# Patient Record
Sex: FEMALE | Race: WHITE | NOT HISPANIC OR LATINO | Employment: OTHER | URBAN - METROPOLITAN AREA
[De-identification: names, ages, dates, MRNs, and addresses within clinical notes are randomized per-mention and may not be internally consistent; named-entity substitution may affect disease eponyms.]

---

## 2017-01-03 ENCOUNTER — TELEPHONE (OUTPATIENT)
Dept: REHABILITATION | Facility: HOSPITAL | Age: 82
End: 2017-01-03

## 2017-01-05 ENCOUNTER — OFFICE VISIT (OUTPATIENT)
Dept: FAMILY MEDICINE | Facility: CLINIC | Age: 82
End: 2017-01-05
Payer: MEDICARE

## 2017-01-05 ENCOUNTER — CLINICAL SUPPORT (OUTPATIENT)
Dept: REHABILITATION | Facility: HOSPITAL | Age: 82
End: 2017-01-05
Attending: NURSE PRACTITIONER
Payer: MEDICARE

## 2017-01-05 VITALS
HEIGHT: 63 IN | SYSTOLIC BLOOD PRESSURE: 110 MMHG | WEIGHT: 142.81 LBS | DIASTOLIC BLOOD PRESSURE: 58 MMHG | RESPIRATION RATE: 16 BRPM | HEART RATE: 60 BPM | BODY MASS INDEX: 25.3 KG/M2

## 2017-01-05 DIAGNOSIS — R07.0 PAIN IN THROAT: Primary | ICD-10-CM

## 2017-01-05 DIAGNOSIS — J38.00 VOCAL CORD PARALYSIS: Primary | ICD-10-CM

## 2017-01-05 DIAGNOSIS — G89.4 CHRONIC PAIN SYNDROME: ICD-10-CM

## 2017-01-05 DIAGNOSIS — M79.605 BILATERAL LEG PAIN: ICD-10-CM

## 2017-01-05 DIAGNOSIS — M79.604 BILATERAL LEG PAIN: ICD-10-CM

## 2017-01-05 DIAGNOSIS — R13.12 OROPHARYNGEAL DYSPHAGIA: ICD-10-CM

## 2017-01-05 DIAGNOSIS — I10 ESSENTIAL HYPERTENSION: ICD-10-CM

## 2017-01-05 DIAGNOSIS — M79.7 FIBROMYALGIA: ICD-10-CM

## 2017-01-05 PROCEDURE — G8997 SWALLOW GOAL STATUS: HCPCS | Mod: CI

## 2017-01-05 PROCEDURE — 92610 EVALUATE SWALLOWING FUNCTION: CPT

## 2017-01-05 PROCEDURE — G8996 SWALLOW CURRENT STATUS: HCPCS | Mod: CJ

## 2017-01-05 PROCEDURE — 99999 PR PBB SHADOW E&M-EST. PATIENT-LVL III: CPT | Mod: PBBFAC,,, | Performed by: FAMILY MEDICINE

## 2017-01-05 PROCEDURE — 99213 OFFICE O/P EST LOW 20 MIN: CPT | Mod: S$PBB,,, | Performed by: FAMILY MEDICINE

## 2017-01-05 PROCEDURE — 99213 OFFICE O/P EST LOW 20 MIN: CPT | Mod: PBBFAC | Performed by: FAMILY MEDICINE

## 2017-01-05 RX ORDER — HYDROCODONE BITARTRATE AND ACETAMINOPHEN 10; 325 MG/1; MG/1
1 TABLET ORAL 4 TIMES DAILY
Qty: 120 TABLET | Refills: 0 | Status: SHIPPED | OUTPATIENT
Start: 2017-02-06 | End: 2017-01-05 | Stop reason: SDUPTHER

## 2017-01-05 RX ORDER — BUMETANIDE 1 MG/1
TABLET ORAL
Qty: 30 TABLET | Refills: 5 | Status: SHIPPED | OUTPATIENT
Start: 2017-01-05 | End: 2018-02-07 | Stop reason: SDUPTHER

## 2017-01-05 RX ORDER — HYDROCODONE BITARTRATE AND ACETAMINOPHEN 10; 325 MG/1; MG/1
1 TABLET ORAL 4 TIMES DAILY
Qty: 120 TABLET | Refills: 0 | Status: SHIPPED | OUTPATIENT
Start: 2017-04-06 | End: 2017-04-03 | Stop reason: SDUPTHER

## 2017-01-05 RX ORDER — HYDROCODONE BITARTRATE AND ACETAMINOPHEN 10; 325 MG/1; MG/1
1 TABLET ORAL 4 TIMES DAILY
Qty: 120 TABLET | Refills: 0 | Status: SHIPPED | OUTPATIENT
Start: 2017-03-05 | End: 2017-02-03 | Stop reason: SDUPTHER

## 2017-01-05 RX ORDER — METOPROLOL TARTRATE 100 MG/1
TABLET ORAL
Qty: 30 TABLET | Refills: 5 | Status: SHIPPED | OUTPATIENT
Start: 2017-04-05 | End: 2017-07-06 | Stop reason: SDUPTHER

## 2017-01-05 RX ORDER — DICLOFENAC SODIUM 10 MG/G
GEL TOPICAL
Qty: 500 G | Refills: 11 | Status: SHIPPED | OUTPATIENT
Start: 2017-01-05 | End: 2017-10-05 | Stop reason: SDUPTHER

## 2017-01-05 RX ORDER — METOPROLOL TARTRATE 100 MG/1
TABLET ORAL
Qty: 30 TABLET | Refills: 5 | Status: SHIPPED | OUTPATIENT
Start: 2017-01-05 | End: 2017-01-05 | Stop reason: SDUPTHER

## 2017-01-05 NOTE — PROGRESS NOTES
Outpatient Speech Pathology Evaluation-Dysphagia      Date: 01/05/17    Start Time:  0910  Stop Time:  0950  Total time: 40 minutes  Charges Billed/# of units:  1X Eval of swallow and oral function  Referring Physician: Alysa Nogueira NP    Primary Diagnosis:  Dysphagia/ Dysphonia  Treatment Diagnosis:  Dysphagia Dysphonia  Certification Period:  01/05/17 to 02/10/17  Past Medical History:  Past Medical History   Diagnosis Date    Arthritis     Bowel obstruction 4/6/214     [facility]     Diverticulitis     Fibromyalgia     Hyperlipidemia     Hypertension     Pinched nerve      x 2    Sciatica      Precautions:  none  Prior Therapy:  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      Medications:   Current Outpatient Prescriptions on File Prior to Visit   Medication Sig Dispense Refill    amitriptyline (ELAVIL) 25 MG tablet Take 1 tablet (25 mg total) by mouth every evening. 30 tablet 5    bumetanide (BUMEX) 1 MG tablet Take for severe swelling of her legs 30 tablet 5    cloNIDine (CATAPRES) 0.1 MG tablet Take 0.5 tablets (0.05 mg total) by mouth 2 (two) times daily. 60 tablet 5    cyanocobalamin (VITAMIN B-12) 250 MCG tablet Take 250 mcg by mouth once daily.      diclofenac sodium (VOLTAREN) 1 % Gel APPLY TOPICALLY 4 TIMES A DAY AS NEEDED 500 g 11    ergocalciferol (VITAMIN D2) 50,000 unit Cap Take 1 capsule (50,000 Units total) by mouth every 7 days. 24 capsule 0    gabapentin (NEURONTIN) 300 MG capsule TAKE 2 CAPSULE BY MOUTH BREAKFAST AND LUNCH AND 3 AT BEDTIME 210 capsule 5    [START ON 3/5/2017] hydrocodone-acetaminophen 10-325mg (NORCO)  mg Tab Take 1 tablet by mouth 4 (four) times daily. 120 tablet 0    [START ON 4/6/2017] hydrocodone-acetaminophen 10-325mg (NORCO)   mg Tab Take 1 tablet by mouth 4 (four) times daily. 120 tablet 0    levothyroxine (SYNTHROID) 75 MCG tablet TAKE 1 TABLET (75 MCG TOTAL) BY MOUTH ONCE DAILY. 30 tablet 2    [START ON 4/5/2017] metoprolol tartrate (LOPRESSOR) 100 MG tablet Take one at night for HTN 30 tablet 5    omeprazole (PRILOSEC) 20 MG capsule Take 1 capsule (20 mg total) by mouth once daily. 1 Capsule(s) Oral PRN Every day. 30 capsule 11    ondansetron (ZOFRAN) 4 MG tablet TAKE 1 TABLET (4 MG TOTAL) BY MOUTH EVERY 6 (SIX) HOURS AS NEEDED. 10 tablet 1    potassium chloride (MICRO-K) 10 MEQ CpSR Take 1 capsule (10 mEq total) by mouth once daily. 30 capsule 5     No current facility-administered medications on file prior to visit.      Nutrition:  Currently on pureed diet with thin liquids  Social/Cultural Assessment:  Pt has no children but has nieces that care for her when needed  Prior Level of Function: Independent  Social History:  Pt lives independently  Signs of Abuse: No  Patient Therapy Goals:  To eat without difficulty  Pain:  None reported    ASSESSMENT:  Oral Motor: Overall within functional limits    Swallow: Pt recalled all strategies and exercises as outlined during hospital stay.  Pt tolerated room temperature thin liquids and pudding with head turn, and 3 swallows without throat clear, cough or other outward signs of aspiration. Pt also denied globus feeling as she did during previous admission.  Pt independently followed all stated guidelines as listed above.     Results from MBSS done on 12/28/16 are listed below and will be included in determining goals and plan of care:  Thin, nectar thick, and pudding radiopaque barium was delivered via small teaspoon. Oral phase was characterized by fair bolus cohesion and inconsistent premature spillage. This is felt to be related more to holding bolus related to anxiety. Pharyngeal phase of swallow was characterized by mildly decreased tongue base retraction, mild-moderately decreased  hyolaryngeal contraction and mild-moderately decreased posterior pharyngeal contraction/shortening resulting in residue in the vallecula, pyriform sinuses, and along the lower 2/3 of the posterior pharyngeal wall. No penetration or aspiration were noted. Pt did demonstrate fairly good laryngeal closure throughout the study. Pt demonstrated behavioral response to the residue (slight gag and c/o feeling food stuck and fear.)      Compensatory Strategies  Effortful swallow and heavily iced liquids alone were both ineffective in clearing residue. Left head turn with normal liquids was minimally effective in clearing residue with 3 swallows when done correctly. Pt tended to lean head back during head turn- when done this way it was ineffective in clearing residue. Left head turn (done correctly) with heavily iced liquids and 3 total swallows was most effective in clearing residue. Residue with thin and nectar thick liquids was completely cleared and with pudding residue was cleared to a trace amount. This required an additional 1-2 swallows with Left head turn 5-10 seconds after presentation to clear.     IMPRESSIONS/RECOMMENDATIONS:  Pt demonstrated mild oropharyngeal dysphagia. Pt would benefit from skilled speech services 2-3 times weekly for 6-8 weeks on an outpatient basis to address dysphagia.    Long Term Goals:  1) Pt will tolerate least restrictive diet without gagging or signs of aspiration.   Short Term Objectives:  1a) Pt will complete BOT and laryngeal elevation exercises at good level.   1b) Pt will tolerate NMES to facilitate improved function during exercise and functional swallow trials.      ROBSON Sherwood, CCC-SLP  1/6/2017

## 2017-01-05 NOTE — MR AVS SNAPSHOT
[place] [address]  [facility] [address]  Phone: [phone]  Fax: [phone]                  Cheyenne Leach   2017 10:30 AM   Office Visit    Description:  Female : 7/3/1933   Provider:  Tomasz Lynch MD   Department:  [facility]           Reason for Visit     3 month checkup           Diagnoses this Visit        Comments    Pain in throat    -  Primary     Bilateral leg pain         Chronic pain syndrome         Fibromyalgia         Essential hypertension                To Do List           Future Appointments        Provider Department Dept Phone    2017 9:00 AM OP REHAB, [facility]     2017 9:00 AM OP RE[facility], [facility]     2017 9:00 AM OP REH[facility], [facility]     2017 9:00 AM OP REH[facility], [facility]     2017 9:00 AM OP REHAB, [facility]       Goals (5 Years of Data)     None      Follow-Up and Disposition     Return in about 4 months (around 2017).    Follow-up and Disposition History       These Medications        Disp Refills Start End    bumetanide (BUMEX) 1 MG tablet 30 tablet 5 2017     Take for severe swelling of her legs    Pharmacy: [facility]/pharmacy [address] [place] [address] [place] - [address] Ph #: [phone]       diclofenac sodium (VOLTAREN) 1 % Gel 500 g 11 2017     APPLY TOPICALLY 4 TIMES A DAY AS NEEDED    Pharmacy: [facility]/pharmacy [address] [place] [address] [place] - [address] Ph #: [phone]       hydrocodone-acetaminophen 10-325mg (NORCO)  mg Tab 120 tablet 0 3/5/2017     Take 1 tablet by mouth 4 (four) times daily. - Oral    Pharmacy: [facility]/pharmacy [address] [place] [address], [place]  [address] Ph #: [phone]       metoprolol tartrate (LOPRESSOR) 100 MG tablet 30 tablet 5 2017     Take one at night for HTN    Pharmacy: [facility]/pharmacy [address] [place] - [address] [facility] [address] Ph #: [phone]        hydrocodone-acetaminophen 10-325mg (NORCO)  mg Tab 120 tablet 0 4/6/2017     Take 1 tablet by mouth 4 (four) times daily. - Oral    Pharmacy: [facility]/pharmacy #[id number]  #: [phone]         [facility]     [facility] On Call Nurse Care Line - 24/7 Assistance  Registered nurses in the [facility] On Call Center provide clinical advisement, health education, appointment booking, and other advisory services.  Call for this free service at [phone].             Medications           Message regarding Medications     Verify the changes and/or additions to your medication regime listed below are the same as discussed with your clinician today.  If any of these changes or additions are incorrect, please notify your healthcare provider.        STOP taking these medications     losartan-hydrochlorothiazide 100-12.5 mg (HYZAAR) 100-12.5 mg Tab Take 1 tablet by mouth once daily.           Verify that the below list of medications is an accurate representation of the medications you are currently taking.  If none reported, the list may be blank. If incorrect, please contact your healthcare provider. Carry this list with you in case of emergency.           Current Medications     amitriptyline (ELAVIL) 25 MG tablet Take 1 tablet (25 mg total) by mouth every evening.    bumetanide (BUMEX) 1 MG tablet Take for severe swelling of her legs    cloNIDine (CATAPRES) 0.1 MG tablet Take 0.5 tablets (0.05 mg total) by mouth 2 (two) times daily.    cyanocobalamin (VITAMIN B-12) 250 MCG tablet Take 250 mcg by mouth once daily.    diclofenac sodium (VOLTAREN) 1 % Gel APPLY TOPICALLY 4 TIMES A DAY AS NEEDED    ergocalciferol (VITAMIN D2) 50,000 unit Cap Take 1 capsule (50,000 Units total) by mouth every 7 days.    gabapentin (NEURONTIN) 300 MG capsule TAKE 2 CAPSULE BY MOUTH BREAKFAST AND LUNCH AND 3 AT BEDTIME    hydrocodone-acetaminophen 10-325mg (NORCO)  mg Tab Starting on Mar 05, 2017. Take 1  "tablet by mouth 4 (four) times daily.    hydrocodone-acetaminophen 10-325mg (NORCO)  mg Tab Starting on Apr 06, 2017. Take 1 tablet by mouth 4 (four) times daily.    levothyroxine (SYNTHROID) 75 MCG tablet TAKE 1 TABLET (75 MCG TOTAL) BY MOUTH ONCE DAILY.    metoprolol tartrate (LOPRESSOR) 100 MG tablet Starting on Apr 05, 2017. Take one at night for HTN    omeprazole (PRILOSEC) 20 MG capsule Take 1 capsule (20 mg total) by mouth once daily. 1 Capsule(s) Oral PRN Every day.    ondansetron (ZOFRAN) 4 MG tablet TAKE 1 TABLET (4 MG TOTAL) BY MOUTH EVERY 6 (SIX) HOURS AS NEEDED.    potassium chloride (MICRO-K) 10 MEQ CpSR Take 1 capsule (10 mEq total) by mouth once daily.           Clinical Reference Information           Vital Signs - Last Recorded  Most recent update: 1/5/2017 10:29 AM by Marina Marroquin LPN    BP Pulse Resp Ht Wt BMI    (!) 110/58 (BP Location: Right arm, Patient Position: Sitting, BP Method: Manual) 60 16 5' 3" (1.6 m) 64.8 kg (142 lb 12.8 oz) 25.3 kg/m2      Blood Pressure          Most Recent Value    BP  (!)  110/58      Allergies as of 1/5/2017     Cymbalta [Duloxetine]    Lyrica  [Pregabalin]    Ropinirole    Statins-hmg-coa Reductase Inhibitors    Sulfa (Sulfonamide Antibiotics)      Immunizations Administered on Date of Encounter - 1/5/2017     None      "

## 2017-01-05 NOTE — PROGRESS NOTES
Subjective:       Patient ID: Cheyenne Leach is a 83 y.o. female.    Chief Complaint: 3 month checkup    HPI Comments: Pt is a 83 y.o. female who presents for check up for Bilateral leg pain  Chronic pain syndrome  Fibromyalgia  Essential hypertension  Pain in throat  (primary encounter diagnosis). Doing well on current meds. Denies any side effects. Prevention is up to date.    Review of Systems   Constitutional: Negative for appetite change, chills and fever.   HENT: Positive for sore throat. Negative for rhinorrhea, sinus pressure and trouble swallowing.         Paralysed vocal cord   Respiratory: Negative for cough, chest tightness, shortness of breath and wheezing.    Cardiovascular: Negative for chest pain and palpitations.   Gastrointestinal: Negative for abdominal pain, blood in stool, diarrhea, nausea and vomiting.   Genitourinary: Negative for dysuria, flank pain, hematuria, pelvic pain, urgency, vaginal bleeding, vaginal discharge and vaginal pain.   Musculoskeletal: Negative for back pain, joint swelling and neck stiffness.   Skin: Negative for rash.   Neurological: Negative for dizziness, weakness, light-headedness, numbness and headaches.   Hematological: Does not bruise/bleed easily.   Psychiatric/Behavioral: Negative for agitation. The patient is not nervous/anxious.        Objective:      Physical Exam   Constitutional: She is oriented to person, place, and time. She appears well-developed and well-nourished.   HENT:   Head: Normocephalic.   Eyes: Pupils are equal, round, and reactive to light.   Neck: Normal range of motion. Neck supple. No thyromegaly present.   Cardiovascular: Normal rate and regular rhythm.    Pulmonary/Chest: No respiratory distress. She has no wheezes. She has no rales. She exhibits no tenderness.   Abdominal: She exhibits no distension. There is no tenderness. There is no rebound and no guarding.   Musculoskeletal: Normal range of motion. She exhibits no edema or tenderness.    Lymphadenopathy:     She has no cervical adenopathy.   Neurological: She is alert and oriented to person, place, and time. She has normal reflexes. She displays normal reflexes. No cranial nerve deficit. She exhibits normal muscle tone. Coordination normal.   Voice is much stronger   Skin: Skin is warm and dry. No rash noted. No pallor.   Psychiatric: She has a normal mood and affect. Judgment and thought content normal.       Assessment:       1. Pain in throat    2. Bilateral leg pain    3. Chronic pain syndrome    4. Fibromyalgia    5. Essential hypertension        Plan:   Cheyenne was seen today for 3 month checkup.    Diagnoses and all orders for this visit:    Pain in throat    Bilateral leg pain  -     bumetanide (BUMEX) 1 MG tablet; Take for severe swelling of her legs  -     diclofenac sodium (VOLTAREN) 1 % Gel; APPLY TOPICALLY 4 TIMES A DAY AS NEEDED  -     hydrocodone-acetaminophen 10-325mg (NORCO)  mg Tab; Take 1 tablet by mouth 4 (four) times daily.    Chronic pain syndrome  -     diclofenac sodium (VOLTAREN) 1 % Gel; APPLY TOPICALLY 4 TIMES A DAY AS NEEDED    Fibromyalgia  -     diclofenac sodium (VOLTAREN) 1 % Gel; APPLY TOPICALLY 4 TIMES A DAY AS NEEDED  -     hydrocodone-acetaminophen 10-325mg (NORCO)  mg Tab; Take 1 tablet by mouth 4 (four) times daily.    Essential hypertension  -     metoprolol tartrate (LOPRESSOR) 100 MG tablet; Take one at night for HTN

## 2017-01-06 NOTE — PLAN OF CARE
I have reviewed the evaluation and treatment plan set forth by  Speech Therapy on this date, 01/05/17, for Cheyenne Leach.  I certify the need for these services furnished under this plan of treatment and while under my care.

## 2017-01-11 ENCOUNTER — CLINICAL SUPPORT (OUTPATIENT)
Dept: REHABILITATION | Facility: HOSPITAL | Age: 82
End: 2017-01-11
Attending: FAMILY MEDICINE
Payer: MEDICARE

## 2017-01-11 DIAGNOSIS — R13.12 OROPHARYNGEAL DYSPHAGIA: ICD-10-CM

## 2017-01-11 DIAGNOSIS — J38.00 VOCAL CORD PARALYSIS: Primary | ICD-10-CM

## 2017-01-11 PROCEDURE — 92526 ORAL FUNCTION THERAPY: CPT

## 2017-01-13 ENCOUNTER — TELEPHONE (OUTPATIENT)
Dept: FAMILY MEDICINE | Facility: CLINIC | Age: 82
End: 2017-01-13

## 2017-01-13 DIAGNOSIS — R10.9 STOMACH CRAMPS: Primary | ICD-10-CM

## 2017-01-13 RX ORDER — DICYCLOMINE HYDROCHLORIDE 10 MG/1
10 CAPSULE ORAL 3 TIMES DAILY
Qty: 60 CAPSULE | Refills: 2 | Status: SHIPPED | OUTPATIENT
Start: 2017-01-13 | End: 2017-02-12

## 2017-01-13 NOTE — TELEPHONE ENCOUNTER
Pt c/o lower abdominal cramps X's 1 day. Rating on a pain scale is 10. Eating does not cause the cramps they come and go all day. Last BM was yesterday small and soft.    Pt stated when she has the cramps it doubles her over, and feels like she needs to have a bm. Nothing come out just a small amount of clear mucous.    Please advise.

## 2017-01-13 NOTE — PROGRESS NOTES
Outpatient Speech Language-Pathology    Time In: 0900   Time Out: 0945   Total Time: 45 minutes  Charges:1X Dysphagia Therapy (45 minutes)  Estimated Discharge Date: 02/24/17    Date: 01/13/2017     Visit Number: 2   Visits Since Last Re-Assess: 1   Date of Last Re-Assess: 01/05/2017   G-Codes: - CJ  - CI   Visits Since Last G-Code Update: 1   Date of Eval: 01/05/2017   POC Expiration: 02/24/2017       Cancellations 1   No Shows 0       Individual or Group Session?: I    Previous Medicare Cap: $88.71   Today's Charges: $87.07   Updated Medicare Cap: $175.78     Subjective   Pt was accompanied by one of her nieces.      Objective:      Goals 01/11/17   LTG 1) Pt will tolerate least restrictive diet without gagging or signs of aspiration.     1a) Pt will complete BOT and laryngeal elevation exercises at good level.  Good   1b) Pt will tolerate NMES to facilitate improved function during exercise and functional swallow trials.  See Below      Assessment:  Pt tolerated NMES using VitalStim placement 3B to address base of tongue retraction and posterior pharyngeal wall weakness for 30 minutes at 11.5 mA.  All tasks were completed with NMES in place. Pt completed tongue base and vocal adduction exercises.  Pt tolerated trials of liquids without head turn but using 3 additional swallow without globus sensation or cough or throat clear.  Attempted pudding without head turn and with extra 3 swallows.  Very minimal throat clear was noted but with decreased c/o globus.  Oral trials were again attempted after NMES was removed and there was no change in results.      Plan:  Continue current POC,   Pt may begin to drink liquids without head turn but should continue current diet and all other compensatory strategies(triple swallow, decreased rate and head turn with purees.)    ROBSON Sherwood, CCC-SLP  1/13/2017

## 2017-01-13 NOTE — TELEPHONE ENCOUNTER
----- Message from George Rao sent at 2017  8:50 AM CST -----  Contact: Patient  Cheyenne Leach  MRN: [id number]  : 7/3/1933  PCP: Tomasz Lynch  Home Phone      [phone]  Work Phone      Not on file.  Mobile          Not on file.      MESSAGE: bad abdominal cramps -- would like Rx -- uses [facility]    Call [phone]    PCP: Syed

## 2017-01-16 ENCOUNTER — CLINICAL SUPPORT (OUTPATIENT)
Dept: REHABILITATION | Facility: HOSPITAL | Age: 82
End: 2017-01-16
Attending: FAMILY MEDICINE
Payer: MEDICARE

## 2017-01-16 ENCOUNTER — TELEPHONE (OUTPATIENT)
Dept: HEPATOLOGY | Facility: HOSPITAL | Age: 82
End: 2017-01-16

## 2017-01-16 DIAGNOSIS — M17.9 OSTEOARTHRITIS OF KNEE, UNSPECIFIED LATERALITY, UNSPECIFIED OSTEOARTHRITIS TYPE: ICD-10-CM

## 2017-01-16 DIAGNOSIS — R13.12 OROPHARYNGEAL DYSPHAGIA: Primary | ICD-10-CM

## 2017-01-16 DIAGNOSIS — M47.816 OSTEOARTHRITIS OF LUMBAR SPINE, UNSPECIFIED SPINAL OSTEOARTHRITIS COMPLICATION STATUS: ICD-10-CM

## 2017-01-16 DIAGNOSIS — M79.7 FIBROMYALGIA: Primary | ICD-10-CM

## 2017-01-16 DIAGNOSIS — J38.00 VOCAL CORD PARALYSIS: ICD-10-CM

## 2017-01-16 PROCEDURE — 92526 ORAL FUNCTION THERAPY: CPT

## 2017-01-16 NOTE — PROGRESS NOTES
Outpatient Speech Language-Pathology    Time In: 0900   Time Out: 1000   Total Time: 60minutes  Charges:1X Dysphagia Therapy (60 minutes)  Estimated Discharge Date: 02/24/17    Date: 01/16/2017     Visit Number: 3   Visits Since Last Re-Assess: 2   Date of Last Re-Assess: 01/05/2017   G-Codes: - CJ  - CI   Visits Since Last G-Code Update: 2   Date of Eval: 01/05/2017   POC Expiration: 02/24/2017       Cancellations 1   No Shows 0       Individual or Group Session?: I    Previous Medicare Cap: $175.78   Today's Charges: $87.07   Updated Medicare Cap: $262.85     Subjective  Pt accompanied by chasidy.  Pleasant and cooperative.  Asking how much longer she will need to come.  She stated concern regarding putting her family out to bring her.  Niece was very nice and stated we will make it work to do whatever Ms. Leach needs.  Pt stated she ate small bites of potato salad without difficulty.       Objective:      Goals 01/11/17 1/16/17   LTG 1) Pt will tolerate least restrictive diet without gagging or signs of aspiration.      1a) Pt will complete BOT and laryngeal elevation exercises at good level.  Good Good   1b) Pt will tolerate NMES to facilitate improved function during exercise and functional swallow trials.  See Below See below      Assessment:  Pt tolerated NMES using VitalStim placement 3B to address base of tongue retraction and posterior pharyngeal wall weakness for 30 minutes at 11.5 mA.  All tasks were completed with NMES in place. Pt completed tongue base and vocal adduction exercises.  Pt tolerated trials of liquids without head turn using 2-3 additional swallow without globus sensation or cough or throat clear.  Attempted crackers without head turn and with extra 3 swallows.  No throat clear or c/o globus.  Oral trials were again attempted after NMES was removed and there was no change in results.      Plan:  Continue current POC,   Pt may begin to drink liquids and eat crackers, toast, and  fritos without head turn and outside of full meals.  Pt should continue current diet and all other compensatory strategies(triple swallow, decreased rate and head turn with purees) during meals.     ROBSON Sherwood, CCC-SLP  1/16/2017

## 2017-01-18 ENCOUNTER — CLINICAL SUPPORT (OUTPATIENT)
Dept: REHABILITATION | Facility: HOSPITAL | Age: 82
End: 2017-01-18
Attending: NURSE PRACTITIONER
Payer: MEDICARE

## 2017-01-18 ENCOUNTER — TELEPHONE (OUTPATIENT)
Dept: FAMILY MEDICINE | Facility: CLINIC | Age: 82
End: 2017-01-18

## 2017-01-18 DIAGNOSIS — J38.00 VOCAL CORD PARALYSIS: ICD-10-CM

## 2017-01-18 DIAGNOSIS — R13.12 OROPHARYNGEAL DYSPHAGIA: Primary | ICD-10-CM

## 2017-01-18 PROCEDURE — 92526 ORAL FUNCTION THERAPY: CPT

## 2017-01-18 NOTE — PROGRESS NOTES
Outpatient Speech Language-Pathology    Time In: 0900   Time Out: 0945  Total Time: 45 minutes  Charges:1X Dysphagia Therapy (45 minutes)  Estimated Discharge Date: 02/24/17    Date: 01/18/2017     Visit Number: 4   Visits Since Last Re-Assess: 3   Date of Last Re-Assess: 01/05/2017   G-Codes: - CJ  - CI   Visits Since Last G-Code Update: 2   Date of Eval: 01/05/2017   POC Expiration: 02/24/2017       Cancellations 1   No Shows 0       Individual or Group Session?: I    Previous Medicare Cap: $262.85   Today's Charges: $87.07   Updated Medicare Cap: $349.92     Subjective  Pt accompanied by niece.  Pleasant and cooperative.      Objective:    Goals 01/11/17 1/16/17 01/18/17   LTG 1) Pt will tolerate least restrictive diet without gagging or signs of aspiration.       1a) Pt will complete BOT and laryngeal elevation exercises at good level.  Good Good Good    1b) Pt will tolerate NMES to facilitate improved function during exercise and functional swallow trials.  See Below See below See Below      Assessment:  Pt tolerated NMES using VitalStim placement 3B to address base of tongue retraction and posterior pharyngeal wall weakness for 33 minutes at 9.5 mA.  All tasks were completed with NMES in place. Pt completed tongue base and vocal adduction exercises.  Pt tolerated trials of liquids without head turn using 2-3 additional swallow without globus sensation or cough or throat clear.  Attempted crackers without head turn and with extra 3 swallows. Pt with occasional throat clear.      Plan:  Continue current POC.  Pt may continue to drink liquids and eat crackers and toast without head turn and outside of full meals.  Encouraged Pt to begin to try very soft solids without hard skins or small hard pieces while maintaining all other compensatory strategies(triple swallow, decreased rate and head turn with purees) during meals.     ROBSON Sherwood, CCC-SLP  1/18/2017

## 2017-01-18 NOTE — TELEPHONE ENCOUNTER
----- Message from Ada Jason sent at 2017 10:00 AM CST -----  Contact: self  Cheyenne Leach  MRN: [id number]  : 7/3/1933  PCP: Tomasz Lynch  Home Phone      [phone]  Work Phone      Not on file.  Mobile          Not on file.      MESSAGE:   Needs refill on [place].    Phone:  [phone]  Pharmacy:  [facility]

## 2017-01-23 ENCOUNTER — CLINICAL SUPPORT (OUTPATIENT)
Dept: REHABILITATION | Facility: HOSPITAL | Age: 82
End: 2017-01-23
Attending: FAMILY MEDICINE
Payer: MEDICARE

## 2017-01-23 DIAGNOSIS — R13.12 OROPHARYNGEAL DYSPHAGIA: Primary | ICD-10-CM

## 2017-01-23 DIAGNOSIS — J38.00 VOCAL CORD PARALYSIS: ICD-10-CM

## 2017-01-23 PROCEDURE — 92526 ORAL FUNCTION THERAPY: CPT

## 2017-01-23 PROCEDURE — G8997 SWALLOW GOAL STATUS: HCPCS | Mod: CI

## 2017-01-23 PROCEDURE — G8998 SWALLOW D/C STATUS: HCPCS | Mod: CJ

## 2017-01-25 NOTE — PROGRESS NOTES
Speech Language/Pathology    Discharge Report    Date of Last Treatment Session: 01/23/17  Past Medical History   Diagnosis Date    Arthritis     Bowel obstruction 4/6/214     [facility]     Diverticulitis     Fibromyalgia     Hyperlipidemia     Hypertension     Pinched nerve      x 2    Sciatica        Status at initiation of therapy: Mild oropharyngeal dysphagia    Treatment Area(s):  Swallow    Met Goals: 2 out of 3 Total Goals    Participation in Treatment (at discharge):  Cooperative    Functional Status at time of Discharge:  See noted dated 01/23/17    Patient is discharged to Home     ROBSON Sherwood, CCC-SLP  1/25/2017

## 2017-01-25 NOTE — PROGRESS NOTES
Outpatient Speech Language-Pathology    Time In: 0900   Time Out: 0945  Total Time: 45 minutes  Charges:1X Dysphagia Therapy (45 minutes)  Estimated Discharge Date: 02/24/17    Date: 01/25/2017     Visit Number: 5   Visits Since Last Re-Assess: 4   Date of Last Re-Assess: 01/05/2017   G-Codes: - CI  - CJ   Visits Since Last G-Code Update: 2   Date of Eval: 01/05/2017   POC Expiration: 02/24/2017       Cancellations 1   No Shows 0       Individual or Group Session?: I    Previous Medicare Cap: $349.92   Today's Charges: $87.07   Updated Medicare Cap: $436.99     Subjective  Pt accompanied by niece. Pt requested discharge from therapy services.     Objective:    Goals 01/11/17 1/16/17 01/18/17 01/23/17   LTG 1) Pt will tolerate least restrictive diet without gagging or signs of aspiration.        1a) Pt will complete BOT and laryngeal elevation exercises at good level.  Good Good Good  Good   1b) Pt will tolerate NMES to facilitate improved function during exercise and functional swallow trials.  See Below See below See Below See Below      Assessment:  Pt tolerated NMES using VitalStim placement 2A to address base of tongue retraction for 33 minutes at 9.5 mA.  All tasks were completed with NMES in place. Pt completed tongue base and vocal adduction exercises.  Pt tolerated trials of liquids without head turn using 2-3 additional swallow without globus sensation or cough or throat clear.  Attempted crackers with head turn and with extra 3 swallows. Pt with occasional throat clear.      Plan: Pt may continue to drink liquids and eat crackers and toast without head turn and outside of full meals.  Recommend to continue soft solids without hard skins or small hard pieces while maintaining all other compensatory strategies(triple swallow, decreased rate and head turn with purees) during meals. Recommend discharge at this time per patient request.     ROBSON Sherwood, CCC-SLP  1/25/2017

## 2017-02-03 DIAGNOSIS — M79.604 BILATERAL LEG PAIN: ICD-10-CM

## 2017-02-03 DIAGNOSIS — M79.605 BILATERAL LEG PAIN: ICD-10-CM

## 2017-02-03 DIAGNOSIS — M79.7 FIBROMYALGIA: ICD-10-CM

## 2017-02-03 RX ORDER — HYDROCODONE BITARTRATE AND ACETAMINOPHEN 10; 325 MG/1; MG/1
1 TABLET ORAL 4 TIMES DAILY
Qty: 120 TABLET | Refills: 0 | Status: SHIPPED | OUTPATIENT
Start: 2017-03-05 | End: 2017-04-03

## 2017-02-03 RX ORDER — HYDROCODONE BITARTRATE AND ACETAMINOPHEN 10; 325 MG/1; MG/1
1 TABLET ORAL 4 TIMES DAILY
Qty: 120 TABLET | Refills: 0 | Status: SHIPPED | OUTPATIENT
Start: 2017-02-03 | End: 2017-04-03 | Stop reason: SDUPTHER

## 2017-02-03 NOTE — TELEPHONE ENCOUNTER
----- Message from [place] sent at 2/3/2017  3:58 PM CST -----  Contact: suyapa / naila  Cheyenne Leach  MRN: [id number]  : 7/3/1933  PCP: Tomasz Lynch  Home Phone      [phone]  Work Phone      Not on file.  Mobile          [phone]      MESSAGE: pt was in pharmacy, wanting her norco filled today instead of the . Was wanting ellie to send in a new one with today as the start date and pharmacist will delete the old February rx.    Phone: [phone]

## 2017-02-03 NOTE — TELEPHONE ENCOUNTER
pt was in pharmacy, wanting her norco filled today instead of the 5th. Was wanting ellie to send in a new one with today as the start date and pharmacist will delete the old February rx.    Please advise  LOV 01/05/17  LDF 01/05/17    Please put the start day for today

## 2017-04-03 ENCOUNTER — OFFICE VISIT (OUTPATIENT)
Dept: FAMILY MEDICINE | Facility: CLINIC | Age: 82
End: 2017-04-03
Payer: MEDICARE

## 2017-04-03 VITALS
WEIGHT: 133.19 LBS | HEIGHT: 63 IN | HEART RATE: 60 BPM | DIASTOLIC BLOOD PRESSURE: 60 MMHG | RESPIRATION RATE: 16 BRPM | BODY MASS INDEX: 23.6 KG/M2 | SYSTOLIC BLOOD PRESSURE: 102 MMHG

## 2017-04-03 DIAGNOSIS — M79.7 FIBROMYALGIA: ICD-10-CM

## 2017-04-03 DIAGNOSIS — M79.604 BILATERAL LEG PAIN: ICD-10-CM

## 2017-04-03 DIAGNOSIS — M79.605 BILATERAL LEG PAIN: ICD-10-CM

## 2017-04-03 DIAGNOSIS — M17.11 PRIMARY OSTEOARTHRITIS OF RIGHT KNEE: ICD-10-CM

## 2017-04-03 DIAGNOSIS — I10 ESSENTIAL HYPERTENSION: Primary | ICD-10-CM

## 2017-04-03 PROCEDURE — 1159F MED LIST DOCD IN RCRD: CPT | Performed by: FAMILY MEDICINE

## 2017-04-03 PROCEDURE — 1157F ADVNC CARE PLAN IN RCRD: CPT | Performed by: FAMILY MEDICINE

## 2017-04-03 PROCEDURE — 3074F SYST BP LT 130 MM HG: CPT | Performed by: FAMILY MEDICINE

## 2017-04-03 PROCEDURE — 20610 DRAIN/INJ JOINT/BURSA W/O US: CPT | Mod: S$PBB | Performed by: FAMILY MEDICINE

## 2017-04-03 PROCEDURE — 99213 OFFICE O/P EST LOW 20 MIN: CPT | Mod: S$PBB | Performed by: FAMILY MEDICINE

## 2017-04-03 PROCEDURE — 3078F DIAST BP <80 MM HG: CPT | Performed by: FAMILY MEDICINE

## 2017-04-03 PROCEDURE — 99213 OFFICE O/P EST LOW 20 MIN: CPT | Mod: PBBFAC | Performed by: FAMILY MEDICINE

## 2017-04-03 PROCEDURE — 1160F RVW MEDS BY RX/DR IN RCRD: CPT | Performed by: FAMILY MEDICINE

## 2017-04-03 PROCEDURE — 99999 PR PBB SHADOW E&M-EST. PATIENT-LVL III: CPT | Mod: PBBFAC,,, | Performed by: FAMILY MEDICINE

## 2017-04-03 PROCEDURE — 1125F AMNT PAIN NOTED PAIN PRSNT: CPT | Performed by: FAMILY MEDICINE

## 2017-04-03 RX ORDER — HYDROCODONE BITARTRATE AND ACETAMINOPHEN 10; 325 MG/1; MG/1
1 TABLET ORAL 4 TIMES DAILY
Qty: 120 TABLET | Refills: 0 | Status: SHIPPED | OUTPATIENT
Start: 2017-04-06 | End: 2017-06-26 | Stop reason: SDUPTHER

## 2017-04-03 RX ORDER — HYDROCODONE BITARTRATE AND ACETAMINOPHEN 10; 325 MG/1; MG/1
1 TABLET ORAL 4 TIMES DAILY
Qty: 120 TABLET | Refills: 0 | Status: SHIPPED | OUTPATIENT
Start: 2017-05-03 | End: 2017-06-26 | Stop reason: SDUPTHER

## 2017-04-03 RX ORDER — BUPIVACAINE HYDROCHLORIDE 5 MG/ML
1 INJECTION, SOLUTION EPIDURAL; INTRACAUDAL
Status: COMPLETED | OUTPATIENT
Start: 2017-04-03 | End: 2017-04-03

## 2017-04-03 RX ORDER — HYDROCODONE BITARTRATE AND ACETAMINOPHEN 10; 325 MG/1; MG/1
1 TABLET ORAL 4 TIMES DAILY
Qty: 120 TABLET | Refills: 0 | Status: SHIPPED | OUTPATIENT
Start: 2017-06-03 | End: 2017-07-10 | Stop reason: SDUPTHER

## 2017-04-03 RX ORDER — KETOROLAC TROMETHAMINE 30 MG/ML
15 INJECTION, SOLUTION INTRAMUSCULAR; INTRAVENOUS
Status: COMPLETED | OUTPATIENT
Start: 2017-04-03 | End: 2017-04-03

## 2017-04-03 RX ORDER — METHYLPREDNISOLONE ACETATE 40 MG/ML
40 INJECTION, SUSPENSION INTRA-ARTICULAR; INTRALESIONAL; INTRAMUSCULAR; SOFT TISSUE
Status: COMPLETED | OUTPATIENT
Start: 2017-04-03 | End: 2017-04-03

## 2017-04-03 RX ADMIN — METHYLPREDNISOLONE ACETATE 40 MG: 40 INJECTION, SUSPENSION INTRA-ARTICULAR; INTRALESIONAL; INTRAMUSCULAR; SOFT TISSUE at 12:04

## 2017-04-03 RX ADMIN — KETOROLAC TROMETHAMINE 15 MG: 30 INJECTION, SOLUTION INTRAMUSCULAR; INTRAVENOUS at 12:04

## 2017-04-03 RX ADMIN — BUPIVACAINE HYDROCHLORIDE 5 MG: 5 INJECTION, SOLUTION EPIDURAL; INTRACAUDAL; PERINEURAL at 12:04

## 2017-04-03 NOTE — PROGRESS NOTES
Subjective:       Patient ID: Cheyenne Leach is a 83 y.o. female.    Chief Complaint: Knee Pain (bilateral) and Foot Pain (right pain)    Knee Pain    The incident occurred more than 1 week ago. The pain is present in the right knee. The pain is at a severity of 8/10. The pain has been worsening since onset. Associated symptoms include an inability to bear weight and muscle weakness. Pertinent negatives include no numbness.   Foot Pain   Associated symptoms include arthralgias. Pertinent negatives include no abdominal pain, chest pain, chills, coughing, fever, headaches, joint swelling, nausea, numbness, rash, sore throat, vomiting or weakness.     Review of Systems   Constitutional: Negative for appetite change, chills and fever.   HENT: Negative for rhinorrhea, sinus pressure, sore throat and trouble swallowing.    Respiratory: Negative for cough, chest tightness, shortness of breath and wheezing.    Cardiovascular: Negative for chest pain and palpitations.   Gastrointestinal: Negative for abdominal pain, blood in stool, diarrhea, nausea and vomiting.   Genitourinary: Negative for dysuria, flank pain, hematuria, pelvic pain, urgency, vaginal bleeding, vaginal discharge and vaginal pain.   Musculoskeletal: Positive for arthralgias. Negative for back pain, joint swelling and neck stiffness.        R knee aches plenty   Skin: Negative for rash.   Neurological: Negative for dizziness, weakness, light-headedness, numbness and headaches.   Hematological: Does not bruise/bleed easily.   Psychiatric/Behavioral: Negative for agitation. The patient is not nervous/anxious.        Objective:      Physical Exam   Constitutional: She is oriented to person, place, and time. She appears well-developed and well-nourished.   HENT:   Head: Normocephalic.   Eyes: Pupils are equal, round, and reactive to light.   Neck: Normal range of motion. Neck supple. No thyromegaly present.   Cardiovascular: Normal rate and regular rhythm.     Pulmonary/Chest: No respiratory distress. She has no wheezes. She has no rales. She exhibits no tenderness.   Abdominal: She exhibits no distension. There is no tenderness. There is no rebound and no guarding.   Musculoskeletal: She exhibits tenderness. She exhibits no edema.   R knee tender and poor ROM   Lymphadenopathy:     She has no cervical adenopathy.   Neurological: She is alert and oriented to person, place, and time. She has normal reflexes. She displays normal reflexes. No cranial nerve deficit. She exhibits normal muscle tone. Coordination normal.   Skin: Skin is warm and dry. No rash noted. No pallor.   Psychiatric: She has a normal mood and affect. Judgment and thought content normal.       Assessment:       1. Essential hypertension    2. Fibromyalgia    3. Bilateral leg pain    4. Primary osteoarthritis of right knee        Plan:   Cheyenne was seen today for knee pain and foot pain.    Diagnoses and all orders for this visit:    Essential hypertension    Fibromyalgia  -     hydrocodone-acetaminophen 10-325mg (NORCO)  mg Tab; Take 1 tablet by mouth 4 (four) times daily.  -     hydrocodone-acetaminophen 10-325mg (NORCO)  mg Tab; Take 1 tablet by mouth 4 (four) times daily.  -     hydrocodone-acetaminophen 10-325mg (NORCO)  mg Tab; Take 1 tablet by mouth 4 (four) times daily.    Bilateral leg pain  -     hydrocodone-acetaminophen 10-325mg (NORCO)  mg Tab; Take 1 tablet by mouth 4 (four) times daily.  -     hydrocodone-acetaminophen 10-325mg (NORCO)  mg Tab; Take 1 tablet by mouth 4 (four) times daily.  -     hydrocodone-acetaminophen 10-325mg (NORCO)  mg Tab; Take 1 tablet by mouth 4 (four) times daily.    Primary osteoarthritis of right knee  -     bupivacaine (PF) 0.5% (5 mg/ml) injection 5 mg; Inject 1 mL (5 mg total) into the articular space one time.  -     methylPREDNISolone acetate injection 40 mg; Inject 1 mL (40 mg total) into the articular space one  time.  -     ketorolac injection 15 mg; Inject 0.5 mLs (15 mg total) into the articular space one time.      Pt's R knee was prepped with Betadine and 1cc Medrol, 1cc Marcaine, & 15 mg Toradol was injected interarticularly into the R knee joint.

## 2017-04-03 NOTE — MR AVS SNAPSHOT
[facility]  [address]  [place] [address]  Phone: [phone]  Fax: [phone]                  Cheyenne Leach   4/3/2017 11:00 AM   Office Visit    Description:  Female : 7/3/1933   Provider:  Tomasz Lynch MD   Department:  [facility]           Reason for Visit     Knee Pain     Foot Pain           Diagnoses this Visit        Comments    Essential hypertension    -  Primary     Fibromyalgia         Bilateral leg pain         Primary osteoarthritis of right knee                To Do List           Future Appointments        Provider Department Dept Phone    2017 9:15 AM Tomasz Lynch MD [facility] [phone]    2017 9:45 AM Scott Russell MD [facility] - Neurology [phone]      Goals (5 Years of Data)     None      Follow-Up and Disposition     Return in about 3 months (around 7/3/2017).    Follow-up and Disposition History       These Medications        Disp Refills Start End    hydrocodone-acetaminophen 10-325mg (NORCO)  mg Tab 120 tablet 0 6/3/2017     Take 1 tablet by mouth 4 (four) times daily. - Oral    Pharmacy: [facility]/pharmacy #[id number] [place], [place] - [address] Ph #: [phone]       hydrocodone-acetaminophen 10-325mg (NORCO)  mg Tab 120 tablet 0 5/3/2017     Take 1 tablet by mouth 4 (four) times daily. - Oral    Pharmacy: [facility]/pharmacy #[id number] [place], [place] - [address] Ph #: [phone]       hydrocodone-acetaminophen 10-325mg (NORCO)  mg Tab 120 tablet 0 2017     Take 1 tablet by mouth 4 (four) times daily. - Oral    Pharmacy: [facility]/pharmacy #[id number] [place], [place] - [address] Ph #: [phone]         [facility] On Call     [facility] On [facility] Nurse Care Line -  Assistance  Unless otherwise directed by your provider, please contact [facility], our nurse care line that is available for  assistance.     Registered nurses in the [facility] provide: appointment  scheduling, clinical advisement, health education, and other advisory services.  Call: [phone] (toll free)               Medications           Message regarding Medications     Verify the changes and/or additions to your medication regime listed below are the same as discussed with your clinician today.  If any of these changes or additions are incorrect, please notify your healthcare provider.        These medications were administered today        Dose Freq    bupivacaine (PF) 0.5% (5 mg/ml) injection 5 mg 1 mL Clinic/HOD 1 time    Sig: Inject 1 mL (5 mg total) into the articular space one time.    Class: Normal    Route: Intra-articular    methylPREDNISolone acetate injection 40 mg 40 mg Clinic/HOD 1 time    Sig: Inject 1 mL (40 mg total) into the articular space one time.    Class: Normal    Route: Intra-articular    ketorolac injection 15 mg 15 mg Clinic/HOD 1 time    Sig: Inject 0.5 mLs (15 mg total) into the articular space one time.    Class: Normal    Route: Intra-articular           Verify that the below list of medications is an accurate representation of the medications you are currently taking.  If none reported, the list may be blank. If incorrect, please contact your healthcare provider. Carry this list with you in case of emergency.           Current Medications     amitriptyline (ELAVIL) 25 MG tablet Take 1 tablet (25 mg total) by mouth every evening.    bumetanide (BUMEX) 1 MG tablet Take for severe swelling of her legs    cloNIDine (CATAPRES) 0.1 MG tablet Take 0.5 tablets (0.05 mg total) by mouth 2 (two) times daily.    cyanocobalamin (VITAMIN B-12) 250 MCG tablet Take 250 mcg by mouth once daily.    diclofenac sodium (VOLTAREN) 1 % Gel APPLY TOPICALLY 4 TIMES A DAY AS NEEDED    ergocalciferol (VITAMIN D2) 50,000 unit Cap Take 1 capsule (50,000 Units total) by mouth every 7 days.    gabapentin (NEURONTIN) 300 MG capsule TAKE 2 CAPSULE BY MOUTH BREAKFAST AND LUNCH AND 3 AT BEDTIME     "hydrocodone-acetaminophen 10-325mg (NORCO)  mg Tab Starting on Jun 03, 2017. Take 1 tablet by mouth 4 (four) times daily.    hydrocodone-acetaminophen 10-325mg (NORCO)  mg Tab Starting on May 03, 2017. Take 1 tablet by mouth 4 (four) times daily.    hydrocodone-acetaminophen 10-325mg (NORCO)  mg Tab Starting on Apr 06, 2017. Take 1 tablet by mouth 4 (four) times daily.    levothyroxine (SYNTHROID) 75 MCG tablet TAKE 1 TABLET (75 MCG TOTAL) BY MOUTH ONCE DAILY.    metoprolol tartrate (LOPRESSOR) 100 MG tablet Starting on Apr 05, 2017. Take one at night for HTN    omeprazole (PRILOSEC) 20 MG capsule Take 1 capsule (20 mg total) by mouth once daily. 1 Capsule(s) Oral PRN Every day.    ondansetron (ZOFRAN) 4 MG tablet TAKE 1 TABLET (4 MG TOTAL) BY MOUTH EVERY 6 (SIX) HOURS AS NEEDED.    potassium chloride (MICRO-K) 10 MEQ CpSR Take 1 capsule (10 mEq total) by mouth once daily.    walker (ULTRA-LIGHT ROLLATOR) Misc Use as directed.           Clinical Reference Information           Your Vitals Were     BP Pulse Resp Height Weight BMI    102/60 (BP Location: Right arm, Patient Position: Sitting, BP Method: Manual) 60 16 5' 3" (1.6 m) 60.4 kg (133 lb 3.2 oz) 23.6 kg/m2      Blood Pressure          Most Recent Value    BP  102/60      Allergies as of 4/3/2017     Cymbalta [Duloxetine]    Lyrica  [Pregabalin]    Ropinirole    Statins-hmg-coa Reductase Inhibitors    Sulfa (Sulfonamide Antibiotics)      Immunizations Administered on Date of Encounter - 4/3/2017     None      Language Assistance Services     ATTENTION: Language assistance services are available, free of charge. Please call [phone].      ATENCIÓN: Si adwoa gentile, tiene a chandler disposición servicios gratuitos de asistencia lingüística. Llame al [phone].     CHÚ Ý: N?u b?n nói Ti?ng Vi?t, có các d?ch v? h? tr? ngôn ng? mi?n phí dành cho b?n. G?i s? [phone].         [facility] complies with applicable Federal " civil rights laws and does not discriminate on the basis of race, color, national origin, age, disability, or sex.

## 2017-06-02 DIAGNOSIS — E03.4 HYPOTHYROIDISM DUE TO ACQUIRED ATROPHY OF THYROID: ICD-10-CM

## 2017-06-02 RX ORDER — LEVOTHYROXINE SODIUM 75 UG/1
TABLET ORAL
Qty: 30 TABLET | Refills: 6 | Status: SHIPPED | OUTPATIENT
Start: 2017-06-02 | End: 2018-04-01 | Stop reason: SDUPTHER

## 2017-06-22 ENCOUNTER — PATIENT OUTREACH (OUTPATIENT)
Dept: ADMINISTRATIVE | Facility: HOSPITAL | Age: 82
End: 2017-06-22

## 2017-06-22 NOTE — LETTER
June 22, 2017    Cheyenne Leach  [address] [place] [address]             [facility]  [address]  [place] [address]  Phone: [phone] Dear Ms. Leach:    [facility] is committed to your overall health.  To help you get the most out of each of your visits, we will review your information to make sure you are up to date on all of your recommended tests and/or procedures.      Dr. Lynch has found that you may be due for a tetanus vaccine, a pneumonia vaccine, and a DEXA scan to assess for osteoporosis.     If you have had any of the above done at another facility, please bring the records or information with you so that your record at [facility] will be complete.  If you would like to schedule any of these, please contact me.    If you are currently taking medication, please bring it with you to your appointment for review.    Also, if you have any type of Advanced Directives, please bring them with you to your office visit so we may scan them into your chart.        If you have any questions or concerns, please don't hesitate to call.    Sincerely,        Samantha Stephens LPN Clinical Care Coordinator  [facility]  [phone]

## 2017-06-26 ENCOUNTER — OFFICE VISIT (OUTPATIENT)
Dept: FAMILY MEDICINE | Facility: CLINIC | Age: 82
End: 2017-06-26
Payer: MEDICARE

## 2017-06-26 VITALS
HEART RATE: 80 BPM | SYSTOLIC BLOOD PRESSURE: 190 MMHG | RESPIRATION RATE: 16 BRPM | DIASTOLIC BLOOD PRESSURE: 90 MMHG | HEIGHT: 63 IN | WEIGHT: 137 LBS | BODY MASS INDEX: 24.27 KG/M2

## 2017-06-26 DIAGNOSIS — S36.69XA RECTAL TEAR: Primary | ICD-10-CM

## 2017-06-26 DIAGNOSIS — K62.89 RECTAL CYST: ICD-10-CM

## 2017-06-26 PROCEDURE — 99214 OFFICE O/P EST MOD 30 MIN: CPT | Mod: PBBFAC | Performed by: NURSE PRACTITIONER

## 2017-06-26 PROCEDURE — 99999 PR PBB SHADOW E&M-EST. PATIENT-LVL IV: CPT | Mod: PBBFAC,,, | Performed by: NURSE PRACTITIONER

## 2017-06-26 PROCEDURE — 99213 OFFICE O/P EST LOW 20 MIN: CPT | Mod: S$PBB | Performed by: NURSE PRACTITIONER

## 2017-06-26 PROCEDURE — 1157F ADVNC CARE PLAN IN RCRD: CPT | Performed by: NURSE PRACTITIONER

## 2017-06-26 PROCEDURE — 1126F AMNT PAIN NOTED NONE PRSNT: CPT | Performed by: NURSE PRACTITIONER

## 2017-06-26 PROCEDURE — 1159F MED LIST DOCD IN RCRD: CPT | Performed by: NURSE PRACTITIONER

## 2017-06-26 RX ORDER — HYDROCORTISONE ACETATE PRAMOXINE HCL 1; 1 G/100G; G/100G
CREAM TOPICAL 2 TIMES DAILY
Qty: 28.4 G | Refills: 2 | Status: SHIPPED | OUTPATIENT
Start: 2017-06-26 | End: 2017-07-10 | Stop reason: SDUPTHER

## 2017-06-26 NOTE — PROGRESS NOTES
Subjective:       Patient ID: Cheyenne Leach is a 83 y.o. female.    Chief Complaint: cyst on rectum    Rectum with a tear and a bump on the rectum. The bump burns and is uncomfortable.      Review of Systems   Constitutional: Negative.  Negative for appetite change, fatigue and fever.   HENT: Negative.  Negative for congestion, ear pain and sore throat.    Eyes: Negative.  Negative for visual disturbance.   Respiratory: Negative.  Negative for cough, shortness of breath and wheezing.    Cardiovascular: Negative.    Gastrointestinal: Positive for constipation. Negative for abdominal pain, diarrhea, nausea and vomiting.        Rectal fissure and lump at the rectum   Endocrine: Negative.    Genitourinary: Negative.  Negative for difficulty urinating and urgency.   Musculoskeletal: Negative.  Negative for arthralgias and myalgias.   Skin: Negative.  Negative for color change.   Allergic/Immunologic: Negative.    Neurological: Negative.  Negative for dizziness and headaches.   Hematological: Negative.    Psychiatric/Behavioral: Negative.  Negative for sleep disturbance.   All other systems reviewed and are negative.      Objective:      Physical Exam   Constitutional: She is oriented to person, place, and time. She appears well-developed and well-nourished. No distress.   HENT:   Head: Normocephalic and atraumatic.   Eyes: Pupils are equal, round, and reactive to light.   Neck: Normal range of motion. Neck supple.   Cardiovascular: Normal rate and regular rhythm.    No murmur heard.  Pulmonary/Chest: Effort normal and breath sounds normal. No respiratory distress.   Abdominal: Soft. Bowel sounds are normal. There is no tenderness.   There is a tear at the rectal area running posteriorly. There is a firm nodule at about 6 o'clock. No bleeding or tenderness to palpation.   Musculoskeletal: Normal range of motion.   Neurological: She is alert and oriented to person, place, and time.   Skin: Skin is warm and dry.    Psychiatric: She has a normal mood and affect.   Nursing note and vitals reviewed.      Assessment:       1. Rectal tear    2. Rectal cyst        Plan:   Cheyenne was seen today for cyst on rectum.    Diagnoses and all orders for this visit:    Rectal tear  -     pramoxine-hydrocortisone (PROCTOCREAM-HC) 1-1 % rectal cream; Place rectally 2 (two) times daily.  -     Ambulatory referral to Colorectal Surgery    Rectal cyst  -     pramoxine-hydrocortisone (PROCTOCREAM-HC) 1-1 % rectal cream; Place rectally 2 (two) times daily.  -     Ambulatory referral to Colorectal Surgery    RTC PRN

## 2017-07-03 DIAGNOSIS — I10 ESSENTIAL HYPERTENSION: ICD-10-CM

## 2017-07-03 RX ORDER — METOPROLOL TARTRATE 100 MG/1
TABLET ORAL
Qty: 30 TABLET | Refills: 5 | Status: SHIPPED | OUTPATIENT
Start: 2017-07-03 | End: 2017-12-22

## 2017-07-06 ENCOUNTER — OFFICE VISIT (OUTPATIENT)
Dept: SURGERY | Facility: CLINIC | Age: 82
End: 2017-07-06
Payer: MEDICARE

## 2017-07-06 VITALS
HEIGHT: 63 IN | WEIGHT: 135.13 LBS | HEART RATE: 64 BPM | DIASTOLIC BLOOD PRESSURE: 76 MMHG | SYSTOLIC BLOOD PRESSURE: 120 MMHG | RESPIRATION RATE: 18 BRPM | BODY MASS INDEX: 23.94 KG/M2

## 2017-07-06 DIAGNOSIS — K64.5 EXTERNAL HEMORRHOID, THROMBOSED: Primary | ICD-10-CM

## 2017-07-06 PROCEDURE — 99999 PR PBB SHADOW E&M-EST. PATIENT-LVL III: CPT | Mod: PBBFAC,,, | Performed by: COLON & RECTAL SURGERY

## 2017-07-06 PROCEDURE — 99203 OFFICE O/P NEW LOW 30 MIN: CPT | Mod: 25,S$PBB,, | Performed by: COLON & RECTAL SURGERY

## 2017-07-06 PROCEDURE — 1159F MED LIST DOCD IN RCRD: CPT | Mod: ,,, | Performed by: COLON & RECTAL SURGERY

## 2017-07-06 PROCEDURE — 46600 DIAGNOSTIC ANOSCOPY SPX: CPT | Mod: PBBFAC | Performed by: COLON & RECTAL SURGERY

## 2017-07-06 PROCEDURE — 1125F AMNT PAIN NOTED PAIN PRSNT: CPT | Mod: ,,, | Performed by: COLON & RECTAL SURGERY

## 2017-07-06 PROCEDURE — 99213 OFFICE O/P EST LOW 20 MIN: CPT | Mod: PBBFAC,25 | Performed by: COLON & RECTAL SURGERY

## 2017-07-06 PROCEDURE — 1157F ADVNC CARE PLAN IN RCRD: CPT | Mod: ,,, | Performed by: COLON & RECTAL SURGERY

## 2017-07-06 PROCEDURE — 46600 DIAGNOSTIC ANOSCOPY SPX: CPT | Mod: S$PBB,,, | Performed by: COLON & RECTAL SURGERY

## 2017-07-06 NOTE — PROGRESS NOTES
Subjective:       Patient ID: Cheyenne Leach is a 84 y.o. female.    Chief Complaint: Rectal Pain    HPI  83 yo F referred by PCP for evaluation of possible anal fissure and rectal cyst.  Patient states they've been present for a few weeks.  C/o constipation - has had 5 surgeries for bowel obstructions so her bowels move very slow.  She does regular enemas to assist with BM's.  C/o anal pain that has been gradually improving since onset of symptoms.  No rectal bleeding.  No abdominal pain, unexplained weight loss, anorexia, recent change in bowel habits, or other constitutional symptoms.      Review of patient's allergies indicates:   Allergen Reactions    Cymbalta [duloxetine] Nausea And Vomiting    Lyrica  [pregabalin]      Other reaction(s): Vomiting    Ropinirole Nausea And Vomiting    Statins-hmg-coa reductase inhibitors      Other reaction(s): Muscle pain    Sulfa (sulfonamide antibiotics) Rash       Past Medical History:   Diagnosis Date    Arthritis     Bowel obstruction 4/6/214    [facility]     Diverticulitis     Fibromyalgia     Hyperlipidemia     Hypertension     Pinched nerve     x 2    Sciatica        Past Surgical History:   Procedure Laterality Date    APPENDECTOMY      BACK SURGERY      lumbar     BREAST SURGERY      lump removed    HYSTERECTOMY      intestinal obstruction  11/2012    intestinal obstruction  4/6/2014    SMALL INTESTINE SURGERY      vocal cord injections         Current Outpatient Prescriptions   Medication Sig Dispense Refill    amitriptyline (ELAVIL) 25 MG tablet Take 1 tablet (25 mg total) by mouth every evening. 30 tablet 5    bumetanide (BUMEX) 1 MG tablet Take for severe swelling of her legs 30 tablet 5    cloNIDine (CATAPRES) 0.1 MG tablet Take 0.5 tablets (0.05 mg total) by mouth 2 (two) times daily. 60 tablet 5    cyanocobalamin (VITAMIN B-12) 250 MCG tablet Take 250 mcg by mouth once daily.      diclofenac sodium (VOLTAREN) 1 % Gel APPLY  TOPICALLY 4 TIMES A DAY AS NEEDED 500 g 11    ergocalciferol (VITAMIN D2) 50,000 unit Cap Take 1 capsule (50,000 Units total) by mouth every 7 days. 24 capsule 0    gabapentin (NEURONTIN) 300 MG capsule TAKE 2 CAPSULE BY MOUTH BREAKFAST AND LUNCH AND 3 AT BEDTIME 210 capsule 5    levothyroxine (SYNTHROID) 75 MCG tablet TAKE 1 TABLET (75 MCG TOTAL) BY MOUTH ONCE DAILY. 30 tablet 6    metoprolol tartrate (LOPRESSOR) 100 MG tablet TAKE ONE AT NIGHT FOR HYPERTENSION 30 tablet 5    omeprazole (PRILOSEC) 20 MG capsule Take 1 capsule (20 mg total) by mouth once daily. 1 Capsule(s) Oral PRN Every day. 30 capsule 11    ondansetron (ZOFRAN) 4 MG tablet TAKE 1 TABLET (4 MG TOTAL) BY MOUTH EVERY 6 (SIX) HOURS AS NEEDED. 10 tablet 1    potassium chloride (MICRO-K) 10 MEQ CpSR Take 1 capsule (10 mEq total) by mouth once daily. 30 capsule 5    walker (ULTRA-LIGHT ROLLATOR) Misc Use as directed. 1 each 0    [START ON 9/10/2017] hydrocodone-acetaminophen 10-325mg (NORCO)  mg Tab Take 1 tablet by mouth every 6 (six) hours as needed. 120 tablet 0    memantine (NAMENDA) 5 MG Tab Take one nightly for 6 weeks, then one BID 60 tablet 11    naloxegol (MOVANTIK) tablet Take 25 mg by mouth once daily. 30 tablet 11    pramoxine-hydrocortisone (PROCTOCREAM-HC) 1-1 % rectal cream Place rectally 2 (two) times daily. 28.4 g 5     No current facility-administered medications for this visit.        Family History   Problem Relation Age of Onset    Cancer Mother      renal    Tuberculosis Father        Social History     Social History    Marital status:      Spouse name: N/A    Number of children: N/A    Years of education: N/A     Social History Main Topics    Smoking status: Never Smoker    Smokeless tobacco: Never Used    Alcohol use No    Drug use: No    Sexual activity: No     Other Topics Concern    None     Social History Narrative    None       Review of Systems   Constitutional: Negative for chills and  fever.   HENT: Negative for congestion and sore throat.    Eyes: Negative for visual disturbance.   Respiratory: Negative for cough and shortness of breath.    Cardiovascular: Negative for chest pain and palpitations.   Gastrointestinal: Positive for constipation and rectal pain. Negative for abdominal distention, abdominal pain, blood in stool, diarrhea, nausea and vomiting.   Endocrine: Negative for cold intolerance and heat intolerance.   Genitourinary: Negative for dysuria and frequency.   Musculoskeletal: Positive for arthralgias. Negative for back pain and neck pain.   Skin: Negative for rash.   Allergic/Immunologic: Negative for immunocompromised state.   Neurological: Negative for dizziness, light-headedness and headaches.   Hematological: Does not bruise/bleed easily.   Psychiatric/Behavioral: Negative for confusion. The patient is not nervous/anxious.        Objective:      Physical Exam   Constitutional: She is oriented to person, place, and time. She appears well-developed and well-nourished.   HENT:   Head: Normocephalic.   Pulmonary/Chest: Effort normal. No respiratory distress.   Abdominal: Soft. Bowel sounds are normal. She exhibits no distension and no mass. There is no tenderness. There is no rebound and no guarding.   Genitourinary:   Genitourinary Comments: Perineum - normal perianal skin, no mass, no fissure, 1 cm thrombosed external hemorrhoid RPL position - soft  ANNA MARIE - good tone, no mass  Anoscopy - no significant internal hemorrhoids     Musculoskeletal: Normal range of motion.   Neurological: She is alert and oriented to person, place, and time.   Skin: Skin is warm and dry.   Psychiatric: She has a normal mood and affect.           Assessment:       1. External hemorrhoid, thrombosed        Plan:   Thrombosis appears to be resolving given improvement in symptoms and its gross appearance.  No intervention needed at present.  Patient reassured that thrombosis should resorb with  time.  Encouraged stool softeners, Miralax prn, avoidance of constipation/straining  RTO prn    Garry Putnam MD, FACS, FASCRS

## 2017-07-06 NOTE — LETTER
July 17, 2017      CELESTINA ADAM  [address] [facility] [address]  [facility] [address]           [facility]  [address] [place] [id number]  Phone: [phone]  Fax: [phone]          Patient: Cheyenne Leach   MR Number: [id number]   YOB: 1933   Date of Visit: 7/6/2017       Dear Celestina Adam:    Thank you for referring Cheyenne Leach to me for evaluation. Attached you will find relevant portions of my assessment and plan of care.    If you have questions, please do not hesitate to call me. I look forward to following Cheyenne Leach along with you.    Sincerely,    Garry Putnam MD    Enclosure  CC:  No Recipients    If you would like to receive this communication electronically, please contact [email or web address] or [phone] to request more information on [employer] access.    For providers and/or their staff who would like to refer a patient to [facility], please contact us through our one-stop-shop provider referral line, [facility] , at [phone].    If you feel you have received this communication in error or would no longer like to receive these types of communications, please e-mail [email or web address]

## 2017-07-10 ENCOUNTER — OFFICE VISIT (OUTPATIENT)
Dept: FAMILY MEDICINE | Facility: CLINIC | Age: 82
End: 2017-07-10
Payer: MEDICARE

## 2017-07-10 VITALS
HEIGHT: 63 IN | BODY MASS INDEX: 24.22 KG/M2 | SYSTOLIC BLOOD PRESSURE: 160 MMHG | WEIGHT: 136.69 LBS | HEART RATE: 78 BPM | DIASTOLIC BLOOD PRESSURE: 98 MMHG | RESPIRATION RATE: 16 BRPM

## 2017-07-10 DIAGNOSIS — M79.604 BILATERAL LEG PAIN: ICD-10-CM

## 2017-07-10 DIAGNOSIS — M79.605 BILATERAL LEG PAIN: ICD-10-CM

## 2017-07-10 DIAGNOSIS — K62.89 RECTAL CYST: ICD-10-CM

## 2017-07-10 DIAGNOSIS — M17.9 OSTEOARTHRITIS OF KNEE, UNSPECIFIED LATERALITY, UNSPECIFIED OSTEOARTHRITIS TYPE: ICD-10-CM

## 2017-07-10 DIAGNOSIS — G57.93 NEUROPATHY INVOLVING BOTH LOWER EXTREMITIES: ICD-10-CM

## 2017-07-10 DIAGNOSIS — I10 ESSENTIAL HYPERTENSION: ICD-10-CM

## 2017-07-10 DIAGNOSIS — M79.7 FIBROMYALGIA: ICD-10-CM

## 2017-07-10 DIAGNOSIS — M47.816 OSTEOARTHRITIS OF LUMBAR SPINE, UNSPECIFIED SPINAL OSTEOARTHRITIS COMPLICATION STATUS: ICD-10-CM

## 2017-07-10 DIAGNOSIS — G89.4 CHRONIC PAIN SYNDROME: Primary | ICD-10-CM

## 2017-07-10 DIAGNOSIS — K59.00 CONSTIPATION, UNSPECIFIED CONSTIPATION TYPE: ICD-10-CM

## 2017-07-10 DIAGNOSIS — S36.69XA RECTAL TEAR: ICD-10-CM

## 2017-07-10 PROCEDURE — 99213 OFFICE O/P EST LOW 20 MIN: CPT | Mod: PBBFAC | Performed by: FAMILY MEDICINE

## 2017-07-10 PROCEDURE — 99999 PR PBB SHADOW E&M-EST. PATIENT-LVL III: CPT | Mod: PBBFAC,,, | Performed by: FAMILY MEDICINE

## 2017-07-10 PROCEDURE — 99213 OFFICE O/P EST LOW 20 MIN: CPT | Mod: S$PBB | Performed by: FAMILY MEDICINE

## 2017-07-10 RX ORDER — HYDROCODONE BITARTRATE AND ACETAMINOPHEN 10; 325 MG/1; MG/1
1 TABLET ORAL EVERY 6 HOURS PRN
Qty: 120 TABLET | Refills: 0 | Status: SHIPPED | OUTPATIENT
Start: 2017-08-10 | End: 2017-07-11 | Stop reason: SDUPTHER

## 2017-07-10 RX ORDER — HYDROCODONE BITARTRATE AND ACETAMINOPHEN 10; 325 MG/1; MG/1
1 TABLET ORAL 4 TIMES DAILY
Qty: 120 TABLET | Refills: 0 | Status: SHIPPED | OUTPATIENT
Start: 2017-07-10 | End: 2017-07-10 | Stop reason: SDUPTHER

## 2017-07-10 RX ORDER — HYDROCODONE BITARTRATE AND ACETAMINOPHEN 10; 325 MG/1; MG/1
1 TABLET ORAL EVERY 6 HOURS PRN
Qty: 120 TABLET | Refills: 0 | Status: SHIPPED | OUTPATIENT
Start: 2017-09-10 | End: 2017-10-10 | Stop reason: SDUPTHER

## 2017-07-10 RX ORDER — HYDROCORTISONE ACETATE PRAMOXINE HCL 1; 1 G/100G; G/100G
CREAM TOPICAL 2 TIMES DAILY
Qty: 28.4 G | Refills: 5 | Status: SHIPPED | OUTPATIENT
Start: 2017-07-10 | End: 2017-12-22

## 2017-07-10 NOTE — PROGRESS NOTES
Subjective:       Patient ID: Cheyenne Leach is a 84 y.o. female.    Chief Complaint: Follow-up ( 3 month ) and Medication Refill    Pt is a 84 y.o. female who presents for check up for Fibromyalgia  Bilateral leg pain  Chronic pain syndrome  (primary encounter diagnosis)  Essential hypertension  Osteoarthritis of lumbar spine, unspecified spinal osteoarthritis complication status  Osteoarthritis of knee, unspecified laterality, unspecified osteoarthritis type  Neuropathy involving both lower extremities  Rectal tear  Rectal cyst. Doing well on current meds. Denies any side effects. Prevention is up to date.      Review of Systems   Constitutional: Negative for appetite change, chills and fever.   HENT: Negative for rhinorrhea, sinus pressure, sore throat and trouble swallowing.    Respiratory: Negative for cough, chest tightness, shortness of breath and wheezing.    Cardiovascular: Negative for chest pain and palpitations.   Gastrointestinal: Negative for abdominal pain, blood in stool, diarrhea, nausea and vomiting.   Genitourinary: Negative for dysuria, flank pain, hematuria, pelvic pain, urgency, vaginal bleeding, vaginal discharge and vaginal pain.   Musculoskeletal: Positive for back pain, joint swelling and myalgias. Negative for neck stiffness.   Skin: Negative for rash.   Neurological: Negative for dizziness, weakness, light-headedness, numbness and headaches.   Hematological: Does not bruise/bleed easily.   Psychiatric/Behavioral: Negative for agitation. The patient is not nervous/anxious.        Objective:      Physical Exam   Constitutional: She is oriented to person, place, and time. She appears well-developed and well-nourished.   HENT:   Head: Normocephalic.   Eyes: Pupils are equal, round, and reactive to light.   Neck: Normal range of motion. Neck supple. No thyromegaly present.   Cardiovascular: Normal rate and regular rhythm.    Pulmonary/Chest: No respiratory distress. She has no wheezes. She has  no rales. She exhibits no tenderness.   Abdominal: She exhibits no distension. There is no tenderness. There is no rebound and no guarding.   Musculoskeletal: Normal range of motion. She exhibits no edema or tenderness.   Lymphadenopathy:     She has no cervical adenopathy.   Neurological: She is alert and oriented to person, place, and time. She has normal reflexes. She displays normal reflexes. No cranial nerve deficit. She exhibits normal muscle tone. Coordination normal.   Skin: Skin is warm and dry. No rash noted. No pallor.   Psychiatric: She has a normal mood and affect. Judgment and thought content normal.       Assessment:       1. Chronic pain syndrome    2. Fibromyalgia    3. Bilateral leg pain    4. Essential hypertension    5. Osteoarthritis of lumbar spine, unspecified spinal osteoarthritis complication status    6. Osteoarthritis of knee, unspecified laterality, unspecified osteoarthritis type    7. Neuropathy involving both lower extremities    8. Rectal tear    9. Rectal cyst        Plan:   Cheyenne was seen today for follow-up and medication refill.    Diagnoses and all orders for this visit:    Chronic pain syndrome    Fibromyalgia  -     Discontinue: hydrocodone-acetaminophen 10-325mg (NORCO)  mg Tab; Take 1 tablet by mouth 4 (four) times daily.  -     hydrocodone-acetaminophen 10-325mg (NORCO)  mg Tab; Take 1 tablet by mouth every 6 (six) hours as needed for Pain.    Bilateral leg pain  -     Discontinue: hydrocodone-acetaminophen 10-325mg (NORCO)  mg Tab; Take 1 tablet by mouth 4 (four) times daily.  -     hydrocodone-acetaminophen 10-325mg (NORCO)  mg Tab; Take 1 tablet by mouth every 6 (six) hours as needed for Pain.    Essential hypertension    Osteoarthritis of lumbar spine, unspecified spinal osteoarthritis complication status    Osteoarthritis of knee, unspecified laterality, unspecified osteoarthritis type    Neuropathy involving both lower extremities    Rectal  tear  -     pramoxine-hydrocortisone (PROCTOCREAM-HC) 1-1 % rectal cream; Place rectally 2 (two) times daily.    Rectal cyst  -     pramoxine-hydrocortisone (PROCTOCREAM-HC) 1-1 % rectal cream; Place rectally 2 (two) times daily.    Other orders  -     hydrocodone-acetaminophen 10-325mg (NORCO)  mg Tab; Take 1 tablet by mouth every 6 (six) hours as needed.

## 2017-07-11 ENCOUNTER — OFFICE VISIT (OUTPATIENT)
Dept: NEUROLOGY | Facility: CLINIC | Age: 82
End: 2017-07-11
Payer: MEDICARE

## 2017-07-11 VITALS
RESPIRATION RATE: 14 BRPM | SYSTOLIC BLOOD PRESSURE: 100 MMHG | BODY MASS INDEX: 27.82 KG/M2 | HEIGHT: 59 IN | DIASTOLIC BLOOD PRESSURE: 58 MMHG | HEART RATE: 60 BPM | WEIGHT: 138 LBS

## 2017-07-11 DIAGNOSIS — M96.1 FAILED BACK SYNDROME, LUMBAR: ICD-10-CM

## 2017-07-11 DIAGNOSIS — G31.84 MCI (MILD COGNITIVE IMPAIRMENT): Primary | ICD-10-CM

## 2017-07-11 DIAGNOSIS — M79.7 FIBROMYALGIA: ICD-10-CM

## 2017-07-11 PROCEDURE — 99214 OFFICE O/P EST MOD 30 MIN: CPT | Mod: S$PBB | Performed by: PSYCHIATRY & NEUROLOGY

## 2017-07-11 PROCEDURE — 99999 PR PBB SHADOW E&M-EST. PATIENT-LVL III: CPT | Mod: PBBFAC,,, | Performed by: PSYCHIATRY & NEUROLOGY

## 2017-07-11 PROCEDURE — 99213 OFFICE O/P EST LOW 20 MIN: CPT | Mod: PBBFAC | Performed by: PSYCHIATRY & NEUROLOGY

## 2017-07-11 RX ORDER — MEMANTINE HYDROCHLORIDE 5 MG/1
TABLET ORAL
Qty: 60 TABLET | Refills: 11 | Status: SHIPPED | OUTPATIENT
Start: 2017-07-11 | End: 2018-05-08 | Stop reason: SDUPTHER

## 2017-07-11 NOTE — PROGRESS NOTES
HPI:Cheyenne Leach is a 84 y.o. female s/p lumbar surgery now with episode of visual loss (bilateral) and leg weakness bilaterally in 5/11. TIA is a possibility and work up for this and other etiologies was largey normal. Mild memory dysfunction noted on recent exam and hypothryoidism noted --now memory much improved with correction of hypothyroidism.  She states her memory over the past year is still challenging but not severely and she still is functioning well with cooking, doing her bills, keeping up her household.  She did forget to mail a check she had made for her car insurance. Memory loss is near daily.  She had difficulty with her voice in 12/2016 which GI said no stroke was found per my review of the notes- inpatient notes states this was vocal cord paralysis.     Review of Systems   Constitutional: Negative for fever.   Eyes: Negative for double vision.   Respiratory: Negative for hemoptysis.    Cardiovascular: Negative for palpitations.   Gastrointestinal: Negative for blood in stool.   Genitourinary: Negative for hematuria.   Musculoskeletal: Positive for back pain and myalgias.        She continues with fibromyalgia pain and her lower back pain which is improved with hydrocodone   Skin: Negative for rash.   Neurological: Negative for tremors and headaches.   Psychiatric/Behavioral: Positive for memory loss.       Exam:   Gen Appearance, well developed/nourished in no apparent distress  CV: 2+ distal pulses with no edema or swelling  Neuro:  MS: Awake, alert, Sustains attention. She is is fully oriented including to date,  Recent recall is good. She tells me about her neighbors that I know do check on her/remote memory intact, Language is full to spontaneous speech/comprehension. Fund of Knowledge is full and she can tell me her recent events   CN:  PERRL, Extraoccular movements and visual fields are full. Normal facial sensation and strength, Tongue and Palate are midline and strong. Shoulder Shrug  symmetric and strong.  Motor: Normal bulk, tone, no abnormal movements. No protonator drift and 5/5 bilateral UE and LE strength  Sensory: Romberg negative and intact to temp and vibration  Cerebellar: Finger-nose, Rapid alternating movements intact  Gait: Normal stance, no ataxia      Labs: 11/2016 TSH normal and normal renal function at last check  Imaging: 3/2014 MRI brain: 1. No MRI evidence of an acute intracranial abnormality.  2. Atrophy and small vessel ischemic changes of the periventricular white matter.        Assessment/Recommendation:  Cheyenne Leach is a 84 y.o. female s/p lumbar surgery now with episode of visual loss (bilateral) and leg weakness bilaterally in 5/11. TIA possibly and work up for this and other etiologies was largey normal. Mild memory dysfunction noted on recent exam and hypothryoidism noted --now memory much improved with correction of hypothyroidism. I recommend:     1. Continue levothyroxine with PCP-- Dr Lynch is following this now and this is normalized. Levels were fluctuating with memory prior.   2. At worse, she has very mild cognitive impairment, but is functioing very well currently and has been stable over the years but symptoms continue to bother her. Trial of lower dose namenda per orders unless side effects.   No restrictions given currently- will monitor. Continue physical exercise and mental exercises for MCI  3. Continue ASA and ARB for HTN for CVA prevention  4. 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   She also has fibromyalgia and takes elavil and gabapentin for pain-all of these treatments are continued via PCP. She saw ENT for vocal cord paralysis which resolved.   RTC  6 months

## 2017-08-30 ENCOUNTER — TELEPHONE (OUTPATIENT)
Dept: FAMILY MEDICINE | Facility: CLINIC | Age: 82
End: 2017-08-30

## 2017-08-30 ENCOUNTER — HOSPITAL ENCOUNTER (OUTPATIENT)
Dept: RADIOLOGY | Facility: HOSPITAL | Age: 82
Discharge: HOME | End: 2017-08-30
Attending: NURSE PRACTITIONER
Payer: MEDICARE

## 2017-08-30 ENCOUNTER — OFFICE VISIT (OUTPATIENT)
Dept: INTERNAL MEDICINE | Facility: CLINIC | Age: 82
End: 2017-08-30
Payer: MEDICARE

## 2017-08-30 VITALS
HEIGHT: 63 IN | BODY MASS INDEX: 24.84 KG/M2 | TEMPERATURE: 99 F | DIASTOLIC BLOOD PRESSURE: 80 MMHG | RESPIRATION RATE: 18 BRPM | WEIGHT: 140.19 LBS | SYSTOLIC BLOOD PRESSURE: 162 MMHG | HEART RATE: 75 BPM

## 2017-08-30 DIAGNOSIS — R10.9 ABDOMINAL PAIN, UNSPECIFIED LOCATION: Primary | ICD-10-CM

## 2017-08-30 DIAGNOSIS — R10.9 ABDOMINAL PAIN, UNSPECIFIED LOCATION: ICD-10-CM

## 2017-08-30 LAB
BILIRUB SERPL-MCNC: ABNORMAL MG/DL
BLOOD URINE, POC: 250
COLOR, POC UA: ABNORMAL
GLUCOSE UR QL STRIP: ABNORMAL
KETONES UR QL STRIP: ABNORMAL
LEUKOCYTE ESTERASE URINE, POC: ABNORMAL
NITRITE, POC UA: ABNORMAL
PH, POC UA: 7
PROTEIN, POC: ABNORMAL
SPECIFIC GRAVITY, POC UA: 1
UROBILINOGEN, POC UA: ABNORMAL

## 2017-08-30 PROCEDURE — 99214 OFFICE O/P EST MOD 30 MIN: CPT | Mod: S$PBB | Performed by: NURSE PRACTITIONER

## 2017-08-30 PROCEDURE — 99215 OFFICE O/P EST HI 40 MIN: CPT | Mod: PBBFAC | Performed by: NURSE PRACTITIONER

## 2017-08-30 PROCEDURE — 99999 PR STA SHADOW: CPT | Mod: PBBFAC,,, | Performed by: NURSE PRACTITIONER

## 2017-08-30 PROCEDURE — 99999 PR PBB SHADOW E&M-EST. PATIENT-LVL V: CPT | Mod: PBBFAC,,, | Performed by: NURSE PRACTITIONER

## 2017-08-30 PROCEDURE — 81002 URINALYSIS NONAUTO W/O SCOPE: CPT | Mod: PBBFAC | Performed by: NURSE PRACTITIONER

## 2017-08-30 PROCEDURE — 74177 CT ABD & PELVIS W/CONTRAST: CPT | Mod: 26,,, | Performed by: RADIOLOGY

## 2017-08-30 PROCEDURE — 74177 CT ABD & PELVIS W/CONTRAST: CPT | Mod: TC

## 2017-08-30 PROCEDURE — 99999 POCT URINE DIPSTICK WITHOUT MICROSCOPE: CPT | Mod: PBBFAC,,, | Performed by: NURSE PRACTITIONER

## 2017-08-30 PROCEDURE — 25500020 PHARM REV CODE 255: Performed by: NURSE PRACTITIONER

## 2017-08-30 RX ORDER — LACTULOSE 10 G/15ML
20 SOLUTION ORAL DAILY PRN
Qty: 300 ML | Refills: 0 | Status: SHIPPED | OUTPATIENT
Start: 2017-08-30 | End: 2017-09-09

## 2017-08-30 RX ORDER — CIPROFLOXACIN 250 MG/1
250 TABLET, FILM COATED ORAL EVERY 12 HOURS
Qty: 14 TABLET | Refills: 0 | Status: SHIPPED | OUTPATIENT
Start: 2017-08-30 | End: 2017-11-10

## 2017-08-30 RX ADMIN — IOHEXOL 75 ML: 350 INJECTION, SOLUTION INTRAVENOUS at 01:08

## 2017-08-30 RX ADMIN — IOHEXOL 30 ML: 350 INJECTION, SOLUTION INTRAVENOUS at 12:08

## 2017-08-30 NOTE — TELEPHONE ENCOUNTER
----- Message from Ada Jason MA sent at 2017  8:12 AM CDT -----  Contact: ward Leach  MRN: [id number]  : 7/3/1933  PCP: Tomasz Lynch  Home Phone      [phone]  Work Phone      Not on file.  Mobile          Not on file.      MESSAGE:   Would like to be seen today for uti.  Offered pt next available - pt refused.  Phone:  [phone]

## 2017-08-30 NOTE — PROGRESS NOTES
"Subjective:       Patient ID: Cheyenne Leach is a 84 y.o. female.    Chief Complaint: Abdominal Pain and Nausea    HPI: Pt presents to clinic today new to me but sees Dr Wheeler. She reports that she started with lower abd pain yesterday. She reports that she urinated a lot last night. Pain is in left lower quad. She has significant hx of bowel obstructions, diverticulitis and UTI. She reports low grade fever. Had BM yesterday normal "for me". No bloody or black. Did vomit this am. No appetite. Did not eat since last night  Review of Systems   Constitutional: Positive for fever. Negative for appetite change and chills.   Respiratory: Negative for cough and shortness of breath.    Cardiovascular: Negative for chest pain, palpitations and leg swelling.   Gastrointestinal: Positive for abdominal pain, nausea and vomiting. Negative for abdominal distention, anal bleeding, blood in stool, constipation and diarrhea.   Genitourinary: Positive for dysuria, frequency and urgency.   Musculoskeletal: Positive for back pain.   Skin: Negative for rash and wound.   Neurological: Negative for dizziness, syncope, weakness, light-headedness and headaches.       Objective:      Physical Exam   Constitutional: She is oriented to person, place, and time. She appears well-developed and well-nourished.   HENT:   Head: Normocephalic and atraumatic.   Cardiovascular: Normal rate, regular rhythm, normal heart sounds and intact distal pulses.    Pulmonary/Chest: Effort normal and breath sounds normal.   Abdominal: Soft. Bowel sounds are normal. She exhibits no distension and no mass. There is tenderness (left lower/upper left and umbilical tenderness with palp). There is guarding. There is no rebound.    hypoactive BS in all quad   Musculoskeletal: Normal range of motion.   -cva tenderness   Neurological: She is alert and oriented to person, place, and time. She has normal reflexes.   Skin: Skin is warm and dry.   Psychiatric: She has a normal " mood and affect.   Nursing note and vitals reviewed.      Assessment:       1. Abdominal pain, unspecified location        Plan:   Cheyenne was seen today for abdominal pain and nausea.    Diagnoses and all orders for this visit:    Abdominal pain, unspecified location  -     POCT URINE DIPSTICK WITHOUT MICROSCOPE- uninterpetable due to she took AZO- minimal relief  -     CBC auto differential; Future  -     Comprehensive metabolic panel; Future  -     Urinalysis; Future  -     Urine culture; Future  -      -     CT Abdomen Pelvis With Contrast; STAT today

## 2017-08-30 NOTE — TELEPHONE ENCOUNTER
LOV 7/10/17    Contacted Pt: stated she thinks she has a UTI. She is having pain in abdomen and threw up this morning. I made pt an apt today at 11:15 to see Yanira Edmondson. Pt verbalized understanding.

## 2017-10-05 DIAGNOSIS — M79.604 BILATERAL LEG PAIN: ICD-10-CM

## 2017-10-05 DIAGNOSIS — M79.605 BILATERAL LEG PAIN: ICD-10-CM

## 2017-10-05 DIAGNOSIS — G89.4 CHRONIC PAIN SYNDROME: ICD-10-CM

## 2017-10-05 DIAGNOSIS — G57.90 NEUROPATHY OF LOWER EXTREMITY, UNSPECIFIED LATERALITY: ICD-10-CM

## 2017-10-05 DIAGNOSIS — M79.7 FIBROMYALGIA: ICD-10-CM

## 2017-10-06 RX ORDER — GABAPENTIN 300 MG/1
CAPSULE ORAL
Qty: 210 CAPSULE | Refills: 3 | Status: SHIPPED | OUTPATIENT
Start: 2017-10-06 | End: 2018-04-29 | Stop reason: SDUPTHER

## 2017-10-06 RX ORDER — ERGOCALCIFEROL 1.25 MG/1
CAPSULE ORAL
Qty: 24 CAPSULE | Refills: 0 | Status: SHIPPED | OUTPATIENT
Start: 2017-10-06 | End: 2017-12-22 | Stop reason: SDUPTHER

## 2017-10-06 RX ORDER — AMITRIPTYLINE HYDROCHLORIDE 25 MG/1
25 TABLET, FILM COATED ORAL NIGHTLY
Qty: 30 TABLET | Refills: 5 | Status: SHIPPED | OUTPATIENT
Start: 2017-10-06 | End: 2018-04-02 | Stop reason: SDUPTHER

## 2017-10-06 RX ORDER — DICLOFENAC SODIUM 10 MG/G
GEL TOPICAL
Qty: 500 G | Refills: 1 | Status: SHIPPED | OUTPATIENT
Start: 2017-10-06 | End: 2018-01-04 | Stop reason: SDUPTHER

## 2017-10-10 ENCOUNTER — OFFICE VISIT (OUTPATIENT)
Dept: FAMILY MEDICINE | Facility: CLINIC | Age: 82
End: 2017-10-10
Payer: MEDICARE

## 2017-10-10 VITALS
BODY MASS INDEX: 23.86 KG/M2 | WEIGHT: 134.69 LBS | HEIGHT: 63 IN | DIASTOLIC BLOOD PRESSURE: 62 MMHG | HEART RATE: 68 BPM | SYSTOLIC BLOOD PRESSURE: 130 MMHG | RESPIRATION RATE: 16 BRPM

## 2017-10-10 DIAGNOSIS — M79.7 FIBROMYALGIA: ICD-10-CM

## 2017-10-10 DIAGNOSIS — M79.604 BILATERAL LEG PAIN: ICD-10-CM

## 2017-10-10 DIAGNOSIS — K59.09 CHRONIC CONSTIPATION: ICD-10-CM

## 2017-10-10 DIAGNOSIS — M79.605 BILATERAL LEG PAIN: ICD-10-CM

## 2017-10-10 DIAGNOSIS — G57.93 NEUROPATHY INVOLVING BOTH LOWER EXTREMITIES: Primary | ICD-10-CM

## 2017-10-10 DIAGNOSIS — I10 ESSENTIAL HYPERTENSION: ICD-10-CM

## 2017-10-10 DIAGNOSIS — M47.816 OSTEOARTHRITIS OF LUMBAR SPINE, UNSPECIFIED SPINAL OSTEOARTHRITIS COMPLICATION STATUS: ICD-10-CM

## 2017-10-10 PROCEDURE — 99999 PR STA SHADOW: CPT | Mod: PBBFAC,,,

## 2017-10-10 PROCEDURE — 20605 DRAIN/INJ JOINT/BURSA W/O US: CPT | Mod: S$PBB | Performed by: FAMILY MEDICINE

## 2017-10-10 PROCEDURE — 99212 OFFICE O/P EST SF 10 MIN: CPT | Mod: PBBFAC | Performed by: FAMILY MEDICINE

## 2017-10-10 PROCEDURE — 99213 OFFICE O/P EST LOW 20 MIN: CPT | Mod: S$PBB | Performed by: FAMILY MEDICINE

## 2017-10-10 PROCEDURE — 99999 PR PBB SHADOW E&M-EST. PATIENT-LVL II: CPT | Mod: PBBFAC,,, | Performed by: FAMILY MEDICINE

## 2017-10-10 PROCEDURE — 99999 PR STA SHADOW: CPT | Mod: PBBFAC,,, | Performed by: FAMILY MEDICINE

## 2017-10-10 RX ORDER — BUPIVACAINE HYDROCHLORIDE 5 MG/ML
20 INJECTION, SOLUTION EPIDURAL; INTRACAUDAL
Status: COMPLETED | OUTPATIENT
Start: 2017-10-10 | End: 2017-10-10

## 2017-10-10 RX ORDER — HYDROCODONE BITARTRATE AND ACETAMINOPHEN 10; 325 MG/1; MG/1
1 TABLET ORAL EVERY 6 HOURS PRN
Qty: 120 TABLET | Refills: 0 | Status: SHIPPED | OUTPATIENT
Start: 2017-11-10 | End: 2017-11-10 | Stop reason: SDUPTHER

## 2017-10-10 RX ORDER — HYDROCODONE BITARTRATE AND ACETAMINOPHEN 10; 325 MG/1; MG/1
1 TABLET ORAL EVERY 6 HOURS PRN
Qty: 120 TABLET | Refills: 0 | Status: SHIPPED | OUTPATIENT
Start: 2017-10-10 | End: 2017-10-10 | Stop reason: SDUPTHER

## 2017-10-10 RX ORDER — HYDROCODONE BITARTRATE AND ACETAMINOPHEN 10; 325 MG/1; MG/1
1 TABLET ORAL EVERY 6 HOURS PRN
Qty: 60 TABLET | Refills: 0 | Status: SHIPPED | OUTPATIENT
Start: 2018-01-10 | End: 2017-11-10 | Stop reason: SDUPTHER

## 2017-10-10 RX ORDER — HYDROCODONE BITARTRATE AND ACETAMINOPHEN 10; 325 MG/1; MG/1
1 TABLET ORAL 2 TIMES DAILY PRN
Qty: 60 TABLET | Refills: 0 | Status: SHIPPED | OUTPATIENT
Start: 2017-12-10 | End: 2017-11-10 | Stop reason: SDUPTHER

## 2017-10-10 RX ORDER — KETOROLAC TROMETHAMINE 30 MG/ML
15 INJECTION, SOLUTION INTRAMUSCULAR; INTRAVENOUS
Status: COMPLETED | OUTPATIENT
Start: 2017-10-10 | End: 2017-10-10

## 2017-10-10 RX ORDER — METHYLPREDNISOLONE ACETATE 40 MG/ML
20 INJECTION, SUSPENSION INTRA-ARTICULAR; INTRALESIONAL; INTRAMUSCULAR; SOFT TISSUE
Status: COMPLETED | OUTPATIENT
Start: 2017-10-10 | End: 2017-10-10

## 2017-10-10 RX ADMIN — BUPIVACAINE HYDROCHLORIDE 100 MG: 5 INJECTION, SOLUTION EPIDURAL; INTRACAUDAL at 12:10

## 2017-10-10 RX ADMIN — KETOROLAC TROMETHAMINE 15 MG: 60 INJECTION, SOLUTION INTRAMUSCULAR at 12:10

## 2017-10-10 RX ADMIN — METHYLPREDNISOLONE ACETATE 20 MG: 40 INJECTION, SUSPENSION INTRA-ARTICULAR; INTRALESIONAL; INTRAMUSCULAR; SOFT TISSUE at 12:10

## 2017-10-10 NOTE — PROGRESS NOTES
Subjective:       Patient ID: Cheyenne Leach is a 84 y.o. female.    Chief Complaint: Foot Swelling (Rt)    Pt is a 84 y.o. female who presents for check up for Neuropathy involving both lower extremities  (primary encounter diagnosis)  Essential hypertension  Fibromyalgia  Osteoarthritis of lumbar spine, unspecified spinal osteoarthritis complication status  Chronic constipation  Bilateral leg pain. Doing well on current meds. Denies any side effects. Prevention is up to date.      Review of Systems   Constitutional: Negative for appetite change, chills and fever.   HENT: Negative for rhinorrhea, sinus pressure, sore throat and trouble swallowing.    Respiratory: Negative for cough, chest tightness, shortness of breath and wheezing.    Cardiovascular: Negative for chest pain and palpitations.   Gastrointestinal: Negative for abdominal pain, blood in stool, diarrhea, nausea and vomiting.   Genitourinary: Negative for dysuria, flank pain, hematuria, pelvic pain, urgency, vaginal bleeding, vaginal discharge and vaginal pain.   Musculoskeletal: Positive for joint swelling. Negative for back pain and neck stiffness.        R ankle and both legs are very very painful   Skin: Negative for rash.   Neurological: Negative for dizziness, weakness, light-headedness, numbness and headaches.   Hematological: Does not bruise/bleed easily.   Psychiatric/Behavioral: Negative for agitation. The patient is not nervous/anxious.        Objective:      Physical Exam   Constitutional: She is oriented to person, place, and time. She appears well-developed and well-nourished.   HENT:   Head: Normocephalic.   Eyes: Pupils are equal, round, and reactive to light.   Neck: Normal range of motion. Neck supple. No thyromegaly present.   Cardiovascular: Normal rate and regular rhythm.    Pulmonary/Chest: No respiratory distress. She has no wheezes. She has no rales. She exhibits no tenderness.   Abdominal: She exhibits no distension. There is no  tenderness. There is no rebound and no guarding.   Musculoskeletal: Normal range of motion. She exhibits no edema or tenderness.   Lymphadenopathy:     She has no cervical adenopathy.   Neurological: She is alert and oriented to person, place, and time. She has normal reflexes. She displays normal reflexes. No cranial nerve deficit. She exhibits normal muscle tone. Coordination normal.   +SLR   Skin: Skin is warm and dry. No rash noted. No pallor.   Psychiatric: She has a normal mood and affect. Judgment and thought content normal.       Assessment:       1. Neuropathy involving both lower extremities    2. Essential hypertension    3. Fibromyalgia    4. Osteoarthritis of lumbar spine, unspecified spinal osteoarthritis complication status    5. Chronic constipation    6. Bilateral leg pain        Plan:   Cheyenne was seen today for foot swelling.    Diagnoses and all orders for this visit:    Neuropathy involving both lower extremities    Essential hypertension    Fibromyalgia    Osteoarthritis of lumbar spine, unspecified spinal osteoarthritis complication status    Chronic constipation    Bilateral leg pain    Pt's R anterior was prepped with Betadine and 20 mg Medrol, .5cc Marcaine, & 15 mg Toradol was injected interarticularly into the R anterior ankle area joint.

## 2017-11-05 DIAGNOSIS — I10 ESSENTIAL HYPERTENSION: ICD-10-CM

## 2017-11-05 RX ORDER — CLONIDINE HYDROCHLORIDE 0.1 MG/1
0.05 TABLET ORAL 2 TIMES DAILY
Qty: 60 TABLET | Refills: 11 | Status: SHIPPED | OUTPATIENT
Start: 2017-11-05 | End: 2018-07-09 | Stop reason: SDUPTHER

## 2017-11-10 ENCOUNTER — OFFICE VISIT (OUTPATIENT)
Dept: FAMILY MEDICINE | Facility: CLINIC | Age: 82
End: 2017-11-10
Payer: MEDICARE

## 2017-11-10 VITALS
BODY MASS INDEX: 25.39 KG/M2 | HEIGHT: 63 IN | RESPIRATION RATE: 16 BRPM | DIASTOLIC BLOOD PRESSURE: 90 MMHG | SYSTOLIC BLOOD PRESSURE: 160 MMHG | WEIGHT: 143.31 LBS | HEART RATE: 64 BPM

## 2017-11-10 DIAGNOSIS — E03.8 OTHER SPECIFIED HYPOTHYROIDISM: ICD-10-CM

## 2017-11-10 DIAGNOSIS — I10 ESSENTIAL HYPERTENSION: ICD-10-CM

## 2017-11-10 DIAGNOSIS — R60.0 EDEMA OF LEG: ICD-10-CM

## 2017-11-10 DIAGNOSIS — G57.93 NEUROPATHY INVOLVING BOTH LOWER EXTREMITIES: ICD-10-CM

## 2017-11-10 DIAGNOSIS — G89.4 CHRONIC PAIN SYNDROME: Primary | ICD-10-CM

## 2017-11-10 PROCEDURE — 96372 THER/PROPH/DIAG INJ SC/IM: CPT | Mod: PBBFAC

## 2017-11-10 PROCEDURE — 99213 OFFICE O/P EST LOW 20 MIN: CPT | Mod: S$PBB | Performed by: FAMILY MEDICINE

## 2017-11-10 PROCEDURE — 99999 PR STA SHADOW: CPT | Mod: PBBFAC,,,

## 2017-11-10 PROCEDURE — 99999 PR PBB SHADOW E&M-EST. PATIENT-LVL II: CPT | Mod: PBBFAC,,, | Performed by: FAMILY MEDICINE

## 2017-11-10 PROCEDURE — 99999 PR STA SHADOW: CPT | Mod: PBBFAC,,, | Performed by: FAMILY MEDICINE

## 2017-11-10 PROCEDURE — 99212 OFFICE O/P EST SF 10 MIN: CPT | Mod: PBBFAC | Performed by: FAMILY MEDICINE

## 2017-11-10 RX ORDER — KETOROLAC TROMETHAMINE 30 MG/ML
30 INJECTION, SOLUTION INTRAMUSCULAR; INTRAVENOUS
Status: COMPLETED | OUTPATIENT
Start: 2017-11-10 | End: 2017-11-10

## 2017-11-10 RX ORDER — HYDROCODONE BITARTRATE AND ACETAMINOPHEN 10; 325 MG/1; MG/1
1 TABLET ORAL EVERY 6 HOURS PRN
Qty: 120 TABLET | Refills: 0 | Status: SHIPPED | OUTPATIENT
Start: 2017-11-10 | End: 2017-12-11 | Stop reason: SDUPTHER

## 2017-11-10 RX ORDER — FUROSEMIDE 10 MG/ML
40 INJECTION INTRAMUSCULAR; INTRAVENOUS
Status: COMPLETED | OUTPATIENT
Start: 2017-11-10 | End: 2017-11-10

## 2017-11-10 RX ORDER — LACTULOSE 10 G/15ML
SOLUTION ORAL; RECTAL
Refills: 0 | COMMUNITY
Start: 2017-08-30 | End: 2017-12-22

## 2017-11-10 RX ADMIN — KETOROLAC TROMETHAMINE 30 MG: 60 INJECTION, SOLUTION INTRAMUSCULAR at 10:11

## 2017-11-10 RX ADMIN — FUROSEMIDE 40 MG: 10 INJECTION, SOLUTION INTRAVENOUS at 10:11

## 2017-11-10 NOTE — PROGRESS NOTES
Subjective:       Patient ID: Cheyenne Leach is a 84 y.o. female.    Chief Complaint: body pains and Leg Swelling    Pt is a 84 y.o. female who presents for check up for Chronic pain syndrome  (primary encounter diagnosis). Doing well on current meds. Denies any side effects. Prevention is up to date.      Review of Systems   Constitutional: Negative for appetite change, chills and fever.        Can't do anything that she likes as walking in her yard   HENT: Negative for rhinorrhea, sinus pressure, sore throat and trouble swallowing.    Respiratory: Negative for cough, chest tightness, shortness of breath and wheezing.    Cardiovascular: Negative for chest pain and palpitations.   Gastrointestinal: Negative for abdominal pain, blood in stool, diarrhea, nausea and vomiting.   Genitourinary: Negative for dysuria, flank pain, hematuria, pelvic pain, urgency, vaginal bleeding, vaginal discharge and vaginal pain.   Musculoskeletal: Positive for arthralgias and myalgias. Negative for back pain, joint swelling and neck stiffness.        Huritng 10/10 all over and everywhere.   Skin: Negative for rash.   Neurological: Negative for dizziness, weakness, light-headedness, numbness and headaches.   Hematological: Does not bruise/bleed easily.   Psychiatric/Behavioral: Negative for agitation. The patient is not nervous/anxious.        Objective:      Physical Exam   Constitutional: She is oriented to person, place, and time. She appears well-developed and well-nourished. She appears distressed.   HENT:   Head: Normocephalic.   Eyes: Pupils are equal, round, and reactive to light.   Neck: Normal range of motion. Neck supple. No thyromegaly present.   Cardiovascular: Normal rate and regular rhythm.    Pulmonary/Chest: Breath sounds normal. No respiratory distress. She has no wheezes. She has no rales. She exhibits no tenderness.   Abdominal: She exhibits no distension. There is no tenderness. There is no rebound and no guarding.    Musculoskeletal: Normal range of motion. She exhibits no edema or tenderness.   Lymphadenopathy:     She has no cervical adenopathy.   Neurological: She is alert and oriented to person, place, and time. She has normal reflexes. She displays normal reflexes. No cranial nerve deficit. She exhibits normal muscle tone. Coordination normal.   Skin: Skin is warm and dry. No rash noted. No pallor.   Psychiatric: She has a normal mood and affect. Judgment and thought content normal.       Assessment:       1. Chronic pain syndrome    2. Essential hypertension    3. Other specified hypothyroidism    4. Neuropathy involving both lower extremities        Plan:   Cheyenne was seen today for body pains and leg swelling.    Diagnoses and all orders for this visit:    Chronic pain syndrome    Essential hypertension    Other specified hypothyroidism    Neuropathy involving both lower extremities    Other orders  -     hydrocodone-acetaminophen 10-325mg (NORCO)  mg Tab; Take 1 tablet by mouth every 6 (six) hours as needed.

## 2017-12-11 ENCOUNTER — OFFICE VISIT (OUTPATIENT)
Dept: FAMILY MEDICINE | Facility: CLINIC | Age: 82
End: 2017-12-11
Payer: MEDICARE

## 2017-12-11 VITALS
HEIGHT: 63 IN | DIASTOLIC BLOOD PRESSURE: 108 MMHG | BODY MASS INDEX: 25.62 KG/M2 | HEART RATE: 74 BPM | WEIGHT: 144.63 LBS | SYSTOLIC BLOOD PRESSURE: 182 MMHG

## 2017-12-11 DIAGNOSIS — I10 ESSENTIAL HYPERTENSION: ICD-10-CM

## 2017-12-11 DIAGNOSIS — R13.12 OROPHARYNGEAL DYSPHAGIA: ICD-10-CM

## 2017-12-11 DIAGNOSIS — G89.29 OTHER CHRONIC BACK PAIN: Primary | ICD-10-CM

## 2017-12-11 DIAGNOSIS — M54.9 OTHER CHRONIC BACK PAIN: Primary | ICD-10-CM

## 2017-12-11 PROCEDURE — 99213 OFFICE O/P EST LOW 20 MIN: CPT | Mod: S$PBB | Performed by: FAMILY MEDICINE

## 2017-12-11 PROCEDURE — 99212 OFFICE O/P EST SF 10 MIN: CPT | Mod: PBBFAC | Performed by: FAMILY MEDICINE

## 2017-12-11 PROCEDURE — 99999 PR PBB SHADOW E&M-EST. PATIENT-LVL II: CPT | Mod: PBBFAC,,, | Performed by: FAMILY MEDICINE

## 2017-12-11 PROCEDURE — 99999 PR STA SHADOW: CPT | Mod: PBBFAC,,, | Performed by: FAMILY MEDICINE

## 2017-12-11 RX ORDER — HYDROCODONE BITARTRATE AND ACETAMINOPHEN 10; 325 MG/1; MG/1
1 TABLET ORAL EVERY 6 HOURS PRN
Qty: 120 TABLET | Refills: 0 | Status: SHIPPED | OUTPATIENT
Start: 2017-12-11 | End: 2017-12-22 | Stop reason: SDUPTHER

## 2017-12-11 RX ORDER — DILTIAZEM HYDROCHLORIDE 60 MG/1
TABLET, FILM COATED ORAL
Qty: 30 TABLET | Refills: 11 | Status: SHIPPED | OUTPATIENT
Start: 2017-12-11 | End: 2018-04-26 | Stop reason: SDUPTHER

## 2017-12-11 RX ORDER — CLONIDINE HYDROCHLORIDE 0.1 MG/1
0.1 TABLET ORAL
Status: COMPLETED | OUTPATIENT
Start: 2017-12-11 | End: 2017-12-11

## 2017-12-11 RX ORDER — LOSARTAN POTASSIUM 100 MG/1
TABLET ORAL
Qty: 30 TABLET | Refills: 11 | Status: SHIPPED | OUTPATIENT
Start: 2017-12-11 | End: 2018-07-09 | Stop reason: SDUPTHER

## 2017-12-11 RX ADMIN — CLONIDINE HYDROCHLORIDE 0.1 MG: 0.1 TABLET ORAL at 01:12

## 2017-12-11 NOTE — PROGRESS NOTES
Subjective:       Patient ID: Cheyenne Leach is a 84 y.o. female.    Chief Complaint: Generalized Body Aches (1m follow up) and Edema    Pt is a 84 y.o. female who presents for check up for Other chronic back pain  (primary encounter diagnosis)  Essential hypertension. Doing well on current meds. Denies any side effects. Prevention is up to date.      Edema   Pertinent negatives include no abdominal pain, chest pain, chills, coughing, fever, headaches, joint swelling, nausea, numbness, rash, sore throat, vomiting or weakness.     Review of Systems   Constitutional: Negative for appetite change, chills and fever.   HENT: Negative for rhinorrhea, sinus pressure, sore throat and trouble swallowing.    Respiratory: Negative for cough, chest tightness, shortness of breath and wheezing.    Cardiovascular: Negative for chest pain and palpitations.   Gastrointestinal: Negative for abdominal pain, blood in stool, diarrhea, nausea and vomiting.   Genitourinary: Negative for dysuria, flank pain, hematuria, pelvic pain, urgency, vaginal bleeding, vaginal discharge and vaginal pain.   Musculoskeletal: Negative for back pain, joint swelling and neck stiffness.        Knees ache   Skin: Negative for rash.   Neurological: Negative for dizziness, weakness, light-headedness, numbness and headaches.   Hematological: Does not bruise/bleed easily.   Psychiatric/Behavioral: Negative for agitation. The patient is not nervous/anxious.        Objective:      Physical Exam   Constitutional: She is oriented to person, place, and time. She appears well-developed and well-nourished.   HENT:   Head: Normocephalic.   Eyes: Pupils are equal, round, and reactive to light.   Neck: Normal range of motion. Neck supple. No thyromegaly present.   Cardiovascular: Normal rate and regular rhythm.    Pulmonary/Chest: No respiratory distress. She has no wheezes. She has no rales. She exhibits no tenderness.   Abdominal: She exhibits no distension. There is  no tenderness. There is no rebound and no guarding.   Musculoskeletal: Normal range of motion. She exhibits no edema or tenderness.   Lymphadenopathy:     She has no cervical adenopathy.   Neurological: She is alert and oriented to person, place, and time. She has normal reflexes. She displays normal reflexes. No cranial nerve deficit. She exhibits normal muscle tone. Coordination normal.   Skin: Skin is warm and dry. No rash noted. No pallor.   Psychiatric: She has a normal mood and affect. Judgment and thought content normal.       Assessment:       1. Other chronic back pain    2. Essential hypertension    3. Oropharyngeal dysphagia        Plan:   Cheyenne was seen today for generalized body aches and edema.    Diagnoses and all orders for this visit:    Other chronic back pain  -     hydrocodone-acetaminophen 10-325mg (NORCO)  mg Tab; Take 1 tablet by mouth every 6 (six) hours as needed.    Essential hypertension    Oropharyngeal dysphagia    Other orders  -     diltiaZEM (CARDIZEM) 60 MG tablet; Take one  Daily at supper  -     losartan (COZAAR) 100 MG tablet; Take one in the AM for HTN

## 2017-12-22 ENCOUNTER — OFFICE VISIT (OUTPATIENT)
Dept: FAMILY MEDICINE | Facility: CLINIC | Age: 82
End: 2017-12-22
Payer: MEDICARE

## 2017-12-22 VITALS
HEIGHT: 63 IN | SYSTOLIC BLOOD PRESSURE: 102 MMHG | BODY MASS INDEX: 24.45 KG/M2 | WEIGHT: 138 LBS | DIASTOLIC BLOOD PRESSURE: 50 MMHG | HEART RATE: 77 BPM

## 2017-12-22 DIAGNOSIS — M54.9 OTHER CHRONIC BACK PAIN: ICD-10-CM

## 2017-12-22 DIAGNOSIS — G89.29 OTHER CHRONIC BACK PAIN: ICD-10-CM

## 2017-12-22 PROCEDURE — 99213 OFFICE O/P EST LOW 20 MIN: CPT | Mod: PBBFAC | Performed by: FAMILY MEDICINE

## 2017-12-22 PROCEDURE — 99213 OFFICE O/P EST LOW 20 MIN: CPT | Mod: S$PBB | Performed by: FAMILY MEDICINE

## 2017-12-22 PROCEDURE — 99999 PR PBB SHADOW E&M-EST. PATIENT-LVL III: CPT | Mod: PBBFAC,,, | Performed by: FAMILY MEDICINE

## 2017-12-22 PROCEDURE — 99999 PR STA SHADOW: CPT | Mod: PBBFAC,,, | Performed by: FAMILY MEDICINE

## 2017-12-22 RX ORDER — HYDROCODONE BITARTRATE AND ACETAMINOPHEN 10; 325 MG/1; MG/1
1 TABLET ORAL EVERY 6 HOURS PRN
Qty: 120 TABLET | Refills: 0 | Status: SHIPPED | OUTPATIENT
Start: 2017-02-09 | End: 2018-02-06 | Stop reason: SDUPTHER

## 2017-12-22 RX ORDER — ONDANSETRON HYDROCHLORIDE 8 MG/1
8 TABLET, FILM COATED ORAL EVERY 8 HOURS PRN
Qty: 10 TABLET | Refills: 5 | Status: SHIPPED | OUTPATIENT
Start: 2017-12-22 | End: 2018-01-15 | Stop reason: SDUPTHER

## 2017-12-22 RX ORDER — HYDROCODONE BITARTRATE AND ACETAMINOPHEN 10; 325 MG/1; MG/1
1 TABLET ORAL EVERY 6 HOURS PRN
Qty: 120 TABLET | Refills: 0 | Status: SHIPPED | OUTPATIENT
Start: 2018-01-09 | End: 2018-01-15 | Stop reason: SDUPTHER

## 2017-12-22 NOTE — PROGRESS NOTES
Subjective:       Patient ID: Cheyenne Leach is a 84 y.o. female.    Chief Complaint: Hypertension (10 day follow up); Back Pain; and Dysphagia    Pt is a 84 y.o. female who presents for recheck up for HTN and chronic pain of her legs. Doing well on current meds. Denies any side effects. Prevention is up to date.      Review of Systems   Constitutional: Negative for appetite change, chills and fever.   HENT: Negative for rhinorrhea, sinus pressure, sore throat and trouble swallowing.    Respiratory: Negative for cough, chest tightness, shortness of breath and wheezing.    Cardiovascular: Negative for chest pain and palpitations.   Gastrointestinal: Negative for abdominal pain, blood in stool, diarrhea, nausea and vomiting.   Genitourinary: Negative for dysuria, flank pain, hematuria, pelvic pain, urgency, vaginal bleeding, vaginal discharge and vaginal pain.   Musculoskeletal: Positive for arthralgias, gait problem and myalgias. Negative for back pain, joint swelling and neck stiffness.        Legs ache plenty and gets relief from the Norco   Skin: Negative for rash.   Neurological: Negative for dizziness, weakness, light-headedness, numbness and headaches.   Hematological: Does not bruise/bleed easily.   Psychiatric/Behavioral: Negative for agitation. The patient is not nervous/anxious.        Objective:      Physical Exam   Constitutional: She is oriented to person, place, and time. She appears well-developed and well-nourished.   HENT:   Head: Normocephalic.   Eyes: Pupils are equal, round, and reactive to light.   Neck: Normal range of motion. Neck supple. No thyromegaly present.   Cardiovascular: Normal rate and regular rhythm.    Pulmonary/Chest: No respiratory distress. She has no wheezes. She has no rales. She exhibits no tenderness.   Abdominal: She exhibits no distension. There is no tenderness. There is no rebound and no guarding.   Musculoskeletal: Normal range of motion. She exhibits no edema or  tenderness.   Lymphadenopathy:     She has no cervical adenopathy.   Neurological: She is alert and oriented to person, place, and time. She has normal reflexes. She displays normal reflexes. No cranial nerve deficit. She exhibits normal muscle tone. Coordination normal.   Skin: Skin is warm and dry. No rash noted. No pallor.   Psychiatric: She has a normal mood and affect. Judgment and thought content normal.       Assessment:       1. Other chronic back pain        Plan:   Cheyenne was seen today for hypertension, back pain and dysphagia.    Diagnoses and all orders for this visit:    Other chronic back pain    Other orders  -     ondansetron (ZOFRAN, AS HYDROCHLORIDE,) 8 MG tablet; Take 1 tablet (8 mg total) by mouth every 8 (eight) hours as needed for Nausea.

## 2018-01-04 DIAGNOSIS — M79.604 BILATERAL LEG PAIN: ICD-10-CM

## 2018-01-04 DIAGNOSIS — M79.605 BILATERAL LEG PAIN: ICD-10-CM

## 2018-01-04 DIAGNOSIS — G89.4 CHRONIC PAIN SYNDROME: ICD-10-CM

## 2018-01-04 DIAGNOSIS — M79.7 FIBROMYALGIA: ICD-10-CM

## 2018-01-04 RX ORDER — DICLOFENAC SODIUM 10 MG/G
GEL TOPICAL
Qty: 500 G | Refills: 1 | Status: SHIPPED | OUTPATIENT
Start: 2018-01-04 | End: 2018-03-07 | Stop reason: SDUPTHER

## 2018-01-15 ENCOUNTER — OFFICE VISIT (OUTPATIENT)
Dept: NEUROLOGY | Facility: CLINIC | Age: 83
End: 2018-01-15
Payer: MEDICARE

## 2018-01-15 VITALS
HEART RATE: 86 BPM | WEIGHT: 141.31 LBS | HEIGHT: 63 IN | RESPIRATION RATE: 16 BRPM | DIASTOLIC BLOOD PRESSURE: 90 MMHG | BODY MASS INDEX: 25.04 KG/M2 | SYSTOLIC BLOOD PRESSURE: 138 MMHG

## 2018-01-15 DIAGNOSIS — M96.1 FAILED BACK SYNDROME, LUMBAR: ICD-10-CM

## 2018-01-15 DIAGNOSIS — M79.7 FIBROMYALGIA: ICD-10-CM

## 2018-01-15 DIAGNOSIS — G31.84 MCI (MILD COGNITIVE IMPAIRMENT): Primary | ICD-10-CM

## 2018-01-15 DIAGNOSIS — R25.2 CRAMPS OF LOWER EXTREMITY: ICD-10-CM

## 2018-01-15 PROCEDURE — 99999 PR STA SHADOW: CPT | Mod: PBBFAC,,, | Performed by: PSYCHIATRY & NEUROLOGY

## 2018-01-15 PROCEDURE — 99213 OFFICE O/P EST LOW 20 MIN: CPT | Mod: PBBFAC | Performed by: PSYCHIATRY & NEUROLOGY

## 2018-01-15 PROCEDURE — 99999 PR PBB SHADOW E&M-EST. PATIENT-LVL III: CPT | Mod: PBBFAC,,, | Performed by: PSYCHIATRY & NEUROLOGY

## 2018-01-15 PROCEDURE — 99214 OFFICE O/P EST MOD 30 MIN: CPT | Mod: S$PBB | Performed by: PSYCHIATRY & NEUROLOGY

## 2018-01-15 RX ORDER — BACLOFEN 10 MG/1
TABLET ORAL
Qty: 90 TABLET | Refills: 3 | Status: SHIPPED | OUTPATIENT
Start: 2018-01-15 | End: 2018-07-09 | Stop reason: SDUPTHER

## 2018-01-15 NOTE — PROGRESS NOTES
HPI:Cheyenne Leach is a 84 y.o. female s/p lumbar surgery now with episode of visual loss (bilateral) and leg weakness bilaterally in 5/11. TIA possibly and work up for this and other etiologies was largey normal. Mild memory dysfunction noted on recent exam and hypothryoidism noted --now memory much improved with correction of hypothyroidism.  Since the last visit, the patient started Namenda. She feels her memory is improved.   The patient reports that she has chronic pain in the entire legs radiating from the lower back- but she also has variable myalgia with her fibromyalgia. She uses medications per PCP for this. Narco helps per her  She has a call button to use at home.   She has some blurred vision with reading at times. Last eye exam was normal  She has cramps at times in her legs fairly recently.   She is driving well without difficulty  Review of Systems   Constitutional: Negative for fever.   HENT: Negative for nosebleeds.    Eyes: Positive for blurred vision. Negative for double vision.   Respiratory: Negative for hemoptysis.    Cardiovascular: Negative for palpitations.   Gastrointestinal: Negative for blood in stool.   Genitourinary: Negative for hematuria.   Musculoskeletal: Positive for back pain and myalgias.   Skin: Negative for rash.   Neurological: Negative for tremors and headaches.   Psychiatric/Behavioral: Positive for memory loss.       Exam:   Gen Appearance, well developed/nourished in no apparent distress  CV: 2+ distal pulses with no edema or swelling except for trace dependent edema  Neuro:  MS: Awake, alert, Sustains attention. She is is fully oriented,Recent recall is good. /remote memory intact, Language is full to spontaneous speech/comprehension. Fund of Knowledge is full and she can tell me her recent events   She has good insight and judgement about her memory and living arrangements  CN:  PERRL, Extraoccular movements and visual fields are full. Normal facial sensation and  strength, Tongue and Palate are midline and strong. Shoulder Shrug symmetric and strong.  Motor: Normal bulk, tone, no abnormal movements. No protonator drift and 5/5 bilateral UE and LE strength and 1+ reflexes  Sensory: Romberg negative and intact to temp and vibration  Cerebellar: Finger-nose, Rapid alternating movements intact  Gait: Normal stance, no ataxia    Labs: 11/2016 TSH normal and normal renal function at last check  Imaging: 3/2014 MRI brain: 1. No MRI evidence of an acute intracranial abnormality.  2. Atrophy and small vessel ischemic changes of the periventricular white matter.    Assessment/Recommendation:  Cheyenne Leach is a 84 y.o. female s/p lumbar surgery now with episode of visual loss (bilateral) and leg weakness bilaterally in 5/11. TIA possibly and work up for this and other etiologies was largey normal. Mild memory dysfunction noted on recent exam and hypothryoidism noted --now memory much improved with correction of hypothyroidism. I recommend:     1. Continue levothyroxine with PCP-- Dr Lynch who follows labs. Memory did fluctuate with hypothyroidism prior.   2. At worse, she has very mild cognitive impairment, but is functioing very well currently and has been stable over the years but symptoms continue to bother her. Continue Namenda for memory loss complaint which she states helps.   No restrictions given currently- will monitor. Continue physical exercise and mental exercises for MCI  3. Continue ASA and ARB for HTN for CVA prevention  4. 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   She also has fibromyalgia and takes elavil and gabapentin for pain via PCP.   5. See optometry for complaint of bilateral blurred vision.   6. Baclofen, low dose for cramps in the legs and pain associated with failed lower pain syndrome unless sedation  RTC  6 months

## 2018-02-06 ENCOUNTER — OFFICE VISIT (OUTPATIENT)
Dept: FAMILY MEDICINE | Facility: CLINIC | Age: 83
End: 2018-02-06
Payer: MEDICARE

## 2018-02-06 VITALS
SYSTOLIC BLOOD PRESSURE: 172 MMHG | RESPIRATION RATE: 16 BRPM | HEIGHT: 63 IN | DIASTOLIC BLOOD PRESSURE: 90 MMHG | BODY MASS INDEX: 25.39 KG/M2 | HEART RATE: 68 BPM | WEIGHT: 143.31 LBS

## 2018-02-06 DIAGNOSIS — M47.816 OSTEOARTHRITIS OF LUMBAR SPINE, UNSPECIFIED SPINAL OSTEOARTHRITIS COMPLICATION STATUS: ICD-10-CM

## 2018-02-06 DIAGNOSIS — G89.4 CHRONIC PAIN SYNDROME: ICD-10-CM

## 2018-02-06 DIAGNOSIS — K59.09 CHRONIC CONSTIPATION: ICD-10-CM

## 2018-02-06 DIAGNOSIS — M54.40 CHRONIC BILATERAL LOW BACK PAIN WITH SCIATICA, SCIATICA LATERALITY UNSPECIFIED: Primary | ICD-10-CM

## 2018-02-06 DIAGNOSIS — I10 ESSENTIAL HYPERTENSION: ICD-10-CM

## 2018-02-06 DIAGNOSIS — G89.29 CHRONIC BILATERAL LOW BACK PAIN WITH SCIATICA, SCIATICA LATERALITY UNSPECIFIED: Primary | ICD-10-CM

## 2018-02-06 DIAGNOSIS — G57.93 NEUROPATHY INVOLVING BOTH LOWER EXTREMITIES: ICD-10-CM

## 2018-02-06 DIAGNOSIS — M79.7 FIBROMYALGIA: ICD-10-CM

## 2018-02-06 PROCEDURE — 99213 OFFICE O/P EST LOW 20 MIN: CPT | Mod: PBBFAC | Performed by: FAMILY MEDICINE

## 2018-02-06 PROCEDURE — 99213 OFFICE O/P EST LOW 20 MIN: CPT | Mod: S$PBB | Performed by: FAMILY MEDICINE

## 2018-02-06 PROCEDURE — 99999 PR STA SHADOW: CPT | Mod: PBBFAC,,, | Performed by: FAMILY MEDICINE

## 2018-02-06 PROCEDURE — 99999 PR PBB SHADOW E&M-EST. PATIENT-LVL III: CPT | Mod: PBBFAC,,, | Performed by: FAMILY MEDICINE

## 2018-02-06 RX ORDER — HYDROCODONE BITARTRATE AND ACETAMINOPHEN 10; 325 MG/1; MG/1
1 TABLET ORAL EVERY 6 HOURS PRN
Qty: 120 TABLET | Refills: 0 | Status: SHIPPED | OUTPATIENT
Start: 2018-04-06 | End: 2018-04-26 | Stop reason: SDUPTHER

## 2018-02-06 RX ORDER — BUPRENORPHINE 10 UG/H
10 PATCH TRANSDERMAL
Qty: 4 PATCH | Refills: 3 | Status: SHIPPED | OUTPATIENT
Start: 2018-02-06 | End: 2018-02-16

## 2018-02-06 RX ORDER — HYDROCODONE BITARTRATE AND ACETAMINOPHEN 10; 325 MG/1; MG/1
1 TABLET ORAL EVERY 6 HOURS PRN
Qty: 120 TABLET | Refills: 0 | Status: SHIPPED | OUTPATIENT
Start: 2018-03-06 | End: 2018-02-06 | Stop reason: SDUPTHER

## 2018-02-06 NOTE — PROGRESS NOTES
Subjective:       Patient ID: Cheyenne Leach is a 84 y.o. female.    Chief Complaint: Follow-up ( 4month )    Pt is a 84 y.o. female who presents for check up for Chronic bilateral low back pain with sciatica, sciatica laterality unspecified  (primary encounter diagnosis)  Chronic constipation  Chronic pain syndrome  Osteoarthritis of lumbar spine, unspecified spinal osteoarthritis complication status  Fibromyalgia  Essential hypertension  Neuropathy involving both lower extremities. Doing well on current meds. Denies any side effects. Prevention is up to date.      Review of Systems   Constitutional: Negative for appetite change, chills and fever.   HENT: Negative for rhinorrhea, sinus pressure, sore throat and trouble swallowing.    Respiratory: Negative for cough, chest tightness, shortness of breath and wheezing.    Cardiovascular: Negative for chest pain and palpitations.   Gastrointestinal: Positive for constipation. Negative for abdominal pain, blood in stool, diarrhea, nausea and vomiting.   Genitourinary: Negative for dysuria, flank pain, hematuria, pelvic pain, urgency, vaginal bleeding, vaginal discharge and vaginal pain.   Musculoskeletal: Positive for back pain. Negative for joint swelling and neck stiffness.        Limited mobility   Skin: Negative for rash.   Neurological: Negative for dizziness, weakness, light-headedness, numbness and headaches.   Hematological: Does not bruise/bleed easily.   Psychiatric/Behavioral: Negative for agitation. The patient is not nervous/anxious.        Objective:      Physical Exam   Constitutional: She is oriented to person, place, and time. She appears well-developed and well-nourished. She appears distressed.   HENT:   Head: Normocephalic.   Eyes: Pupils are equal, round, and reactive to light.   Neck: Normal range of motion. Neck supple. No thyromegaly present.   Cardiovascular: Normal rate and regular rhythm.    Pulmonary/Chest: No respiratory distress. She has no  wheezes. She has no rales. She exhibits no tenderness.   Abdominal: She exhibits no distension. There is no tenderness. There is no rebound and no guarding.   Musculoskeletal: Normal range of motion. She exhibits no edema or tenderness.   Lymphadenopathy:     She has no cervical adenopathy.   Neurological: She is alert and oriented to person, place, and time. She has normal reflexes. She displays normal reflexes. No cranial nerve deficit. She exhibits normal muscle tone. Coordination normal.   Skin: Skin is warm and dry. No rash noted. No pallor.   Psychiatric: She has a normal mood and affect. Judgment and thought content normal.       Assessment:       1. Chronic bilateral low back pain with sciatica, sciatica laterality unspecified    2. Chronic constipation    3. Chronic pain syndrome    4. Osteoarthritis of lumbar spine, unspecified spinal osteoarthritis complication status    5. Fibromyalgia    6. Essential hypertension    7. Neuropathy involving both lower extremities        Plan:   Cheyenne was seen today for follow-up.    Diagnoses and all orders for this visit:    Chronic bilateral low back pain with sciatica, sciatica laterality unspecified    Chronic constipation    Chronic pain syndrome    Osteoarthritis of lumbar spine, unspecified spinal osteoarthritis complication status    Fibromyalgia    Essential hypertension    Neuropathy involving both lower extremities

## 2018-02-06 NOTE — PROGRESS NOTES
Patient, Cheyenne Leach (MRN #[id number]), presented with a recent Platelet count less than 150 K/uL consistent with the definition of thrombocytopenia (ICD10 - D69.6).    Platelets   Date Value Ref Range Status   08/30/2017 149 (L) 150 - 350 K/uL Final     The patient's thrombocytopenia was monitored, evaluated, addressed and/or treated. This addendum to the medical record is made on 02/06/2018.

## 2018-02-07 DIAGNOSIS — M79.604 BILATERAL LEG PAIN: ICD-10-CM

## 2018-02-07 DIAGNOSIS — M79.605 BILATERAL LEG PAIN: ICD-10-CM

## 2018-02-08 RX ORDER — BUMETANIDE 1 MG/1
TABLET ORAL
Qty: 30 TABLET | Refills: 5 | Status: SHIPPED | OUTPATIENT
Start: 2018-02-08 | End: 2018-05-08 | Stop reason: SDUPTHER

## 2018-03-07 DIAGNOSIS — M79.605 BILATERAL LEG PAIN: ICD-10-CM

## 2018-03-07 DIAGNOSIS — M79.7 FIBROMYALGIA: ICD-10-CM

## 2018-03-07 DIAGNOSIS — M79.604 BILATERAL LEG PAIN: ICD-10-CM

## 2018-03-07 DIAGNOSIS — G89.4 CHRONIC PAIN SYNDROME: ICD-10-CM

## 2018-03-07 RX ORDER — DICLOFENAC SODIUM 10 MG/G
GEL TOPICAL
Qty: 500 G | Refills: 1 | Status: SHIPPED | OUTPATIENT
Start: 2018-03-07 | End: 2018-04-26 | Stop reason: SDUPTHER

## 2018-04-01 DIAGNOSIS — E03.4 HYPOTHYROIDISM DUE TO ACQUIRED ATROPHY OF THYROID: ICD-10-CM

## 2018-04-02 RX ORDER — LEVOTHYROXINE SODIUM 75 UG/1
TABLET ORAL
Qty: 30 TABLET | Refills: 2 | Status: SHIPPED | OUTPATIENT
Start: 2018-04-02 | End: 2018-05-08 | Stop reason: SDUPTHER

## 2018-04-02 RX ORDER — AMITRIPTYLINE HYDROCHLORIDE 25 MG/1
25 TABLET, FILM COATED ORAL NIGHTLY
Qty: 30 TABLET | Refills: 5 | Status: SHIPPED | OUTPATIENT
Start: 2018-04-02 | End: 2018-04-26 | Stop reason: SDUPTHER

## 2018-04-19 ENCOUNTER — OFFICE VISIT (OUTPATIENT)
Dept: FAMILY MEDICINE | Facility: CLINIC | Age: 83
End: 2018-04-19
Payer: MEDICARE

## 2018-04-19 ENCOUNTER — TELEPHONE (OUTPATIENT)
Dept: FAMILY MEDICINE | Facility: CLINIC | Age: 83
End: 2018-04-19

## 2018-04-19 VITALS
HEART RATE: 80 BPM | HEIGHT: 63 IN | WEIGHT: 146.63 LBS | RESPIRATION RATE: 18 BRPM | BODY MASS INDEX: 25.98 KG/M2 | DIASTOLIC BLOOD PRESSURE: 100 MMHG | SYSTOLIC BLOOD PRESSURE: 166 MMHG

## 2018-04-19 DIAGNOSIS — M54.31 SCIATICA OF RIGHT SIDE: Primary | ICD-10-CM

## 2018-04-19 PROCEDURE — 96372 THER/PROPH/DIAG INJ SC/IM: CPT | Mod: PBBFAC

## 2018-04-19 PROCEDURE — 99999 PR PBB SHADOW E&M-EST. PATIENT-LVL III: CPT | Mod: PBBFAC,,, | Performed by: FAMILY MEDICINE

## 2018-04-19 PROCEDURE — 99213 OFFICE O/P EST LOW 20 MIN: CPT | Mod: PBBFAC | Performed by: FAMILY MEDICINE

## 2018-04-19 PROCEDURE — 99213 OFFICE O/P EST LOW 20 MIN: CPT | Mod: S$PBB | Performed by: FAMILY MEDICINE

## 2018-04-19 PROCEDURE — 99999 PR STA SHADOW: CPT | Mod: PBBFAC,,,

## 2018-04-19 PROCEDURE — 99999 PR STA SHADOW: CPT | Mod: PBBFAC,,, | Performed by: FAMILY MEDICINE

## 2018-04-19 RX ORDER — KETOROLAC TROMETHAMINE 30 MG/ML
30 INJECTION, SOLUTION INTRAMUSCULAR; INTRAVENOUS
Status: COMPLETED | OUTPATIENT
Start: 2018-04-19 | End: 2018-04-19

## 2018-04-19 RX ORDER — METHYLPREDNISOLONE ACETATE 40 MG/ML
40 INJECTION, SUSPENSION INTRA-ARTICULAR; INTRALESIONAL; INTRAMUSCULAR; SOFT TISSUE
Status: COMPLETED | OUTPATIENT
Start: 2018-04-19 | End: 2018-04-19

## 2018-04-19 RX ORDER — CLINDAMYCIN HYDROCHLORIDE 300 MG/1
CAPSULE ORAL
Refills: 0 | COMMUNITY
Start: 2018-03-13 | End: 2018-07-09

## 2018-04-19 RX ADMIN — KETOROLAC TROMETHAMINE 30 MG: 30 INJECTION, SOLUTION INTRAMUSCULAR at 04:04

## 2018-04-19 RX ADMIN — METHYLPREDNISOLONE ACETATE 40 MG: 40 INJECTION, SUSPENSION INTRA-ARTICULAR; INTRALESIONAL; INTRAMUSCULAR; SOFT TISSUE at 04:04

## 2018-04-19 NOTE — PROGRESS NOTES
Subjective:       Patient ID: Cheyenne Leach is a 84 y.o. female.    Chief Complaint: Edema (both legs )    Pt is a 84 y.o. female who presents for evaluation and management of   Encounter Diagnosis   Name Primary?    Sciatica of right side Yes   .Happens off and on but severe last couple of days   Takes her TCA, gabapent, and muscle relaxer for it which helps.   Needs something for immediate relief now     Doing well on current meds. Denies any side effects. Prevention is up to date.  Review of Systems   Musculoskeletal: Positive for myalgias.   Neurological: Positive for numbness.       Objective:      Physical Exam   Constitutional: She is oriented to person, place, and time. She appears well-developed and well-nourished.   HENT:   Head: Normocephalic and atraumatic.   Right Ear: External ear normal.   Left Ear: External ear normal.   Nose: Nose normal.   Mouth/Throat: Oropharynx is clear and moist.   Eyes: EOM are normal. Pupils are equal, round, and reactive to light.   Neck: Normal range of motion. Neck supple. No JVD present. No tracheal deviation present. No thyromegaly present.   Cardiovascular: Normal rate, normal heart sounds and intact distal pulses.    No murmur heard.  Pulmonary/Chest: Effort normal and breath sounds normal. No respiratory distress. She has no wheezes. She has no rales. She exhibits no tenderness.   Abdominal: Soft. Bowel sounds are normal. She exhibits no distension and no mass. There is no tenderness. There is no rebound and no guarding.   Musculoskeletal: Normal range of motion. She exhibits no edema or tenderness.   Lymphadenopathy:     She has no cervical adenopathy.   Neurological: She is alert and oriented to person, place, and time. She has normal reflexes. She displays normal reflexes. No cranial nerve deficit. She exhibits normal muscle tone. Coordination normal.   Skin: Skin is warm and dry. No rash noted. No erythema. No pallor.   Psychiatric: She has a normal mood and  affect. Her behavior is normal. Judgment and thought content normal.       Assessment:       1. Sciatica of right side        Plan:   Cheyenne was seen today for edema.    Diagnoses and all orders for this visit:    Sciatica of right side  -     methylPREDNISolone acetate injection 40 mg; Inject 1 mL (40 mg total) into the muscle one time.  -     ketorolac injection 30 mg; Inject 1 mL (30 mg total) into the muscle one time.      No Follow-up on file.

## 2018-04-19 NOTE — TELEPHONE ENCOUNTER
----- Message from George Rao sent at 2018  8:35 AM CDT -----  Contact: Patient  Cheyenne Leach  MRN: [id number]  : 7/3/1933  PCP: Tomasz Lynch  Home Phone      [phone]  Work Phone      Not on file.  Mobile          Not on file.      MESSAGE: leg pain -- feet swelling -- requesting an appt today    Call [phone]    PCP: Syed

## 2018-04-23 ENCOUNTER — TELEPHONE (OUTPATIENT)
Dept: FAMILY MEDICINE | Facility: CLINIC | Age: 83
End: 2018-04-23

## 2018-04-23 NOTE — TELEPHONE ENCOUNTER
----- Message from George Rao sent at 2018  2:43 PM CDT -----  Contact: Patient  Cheyenne Leach  MRN: [id number]  : 7/3/1933  PCP: Tomasz Lynch  Home Phone      [phone]  Work Phone      Not on file.  Mobile          Not on file.      MESSAGE: left voicemail asking that a nurse return her call     Call [phone]    PCP: Syed

## 2018-04-26 ENCOUNTER — OFFICE VISIT (OUTPATIENT)
Dept: FAMILY MEDICINE | Facility: CLINIC | Age: 83
End: 2018-04-26
Payer: MEDICARE

## 2018-04-26 VITALS
HEART RATE: 64 BPM | BODY MASS INDEX: 25.8 KG/M2 | DIASTOLIC BLOOD PRESSURE: 84 MMHG | SYSTOLIC BLOOD PRESSURE: 128 MMHG | RESPIRATION RATE: 18 BRPM | HEIGHT: 63 IN | WEIGHT: 145.63 LBS

## 2018-04-26 DIAGNOSIS — G57.90 NEUROPATHY OF LOWER EXTREMITY, UNSPECIFIED LATERALITY: ICD-10-CM

## 2018-04-26 DIAGNOSIS — M79.604 BILATERAL LEG PAIN: ICD-10-CM

## 2018-04-26 DIAGNOSIS — M79.605 BILATERAL LEG PAIN: ICD-10-CM

## 2018-04-26 DIAGNOSIS — G89.4 CHRONIC PAIN SYNDROME: ICD-10-CM

## 2018-04-26 DIAGNOSIS — M79.7 FIBROMYALGIA: ICD-10-CM

## 2018-04-26 PROCEDURE — 99213 OFFICE O/P EST LOW 20 MIN: CPT | Mod: S$PBB | Performed by: FAMILY MEDICINE

## 2018-04-26 PROCEDURE — 99999 PR STA SHADOW: CPT | Mod: PBBFAC,,, | Performed by: FAMILY MEDICINE

## 2018-04-26 PROCEDURE — 20605 DRAIN/INJ JOINT/BURSA W/O US: CPT | Mod: PBBFAC | Performed by: FAMILY MEDICINE

## 2018-04-26 PROCEDURE — 99213 OFFICE O/P EST LOW 20 MIN: CPT | Mod: PBBFAC,25 | Performed by: FAMILY MEDICINE

## 2018-04-26 PROCEDURE — 99999 PR PBB SHADOW E&M-EST. PATIENT-LVL III: CPT | Mod: PBBFAC,,, | Performed by: FAMILY MEDICINE

## 2018-04-26 RX ORDER — DILTIAZEM HYDROCHLORIDE 60 MG/1
TABLET, FILM COATED ORAL
Qty: 30 TABLET | Refills: 11 | Status: SHIPPED | OUTPATIENT
Start: 2018-04-26 | End: 2018-05-08 | Stop reason: SDUPTHER

## 2018-04-26 RX ORDER — METHYLPREDNISOLONE ACETATE 40 MG/ML
20 INJECTION, SUSPENSION INTRA-ARTICULAR; INTRALESIONAL; INTRAMUSCULAR; SOFT TISSUE
Status: COMPLETED | OUTPATIENT
Start: 2018-04-26 | End: 2018-04-26

## 2018-04-26 RX ORDER — DICLOFENAC SODIUM 10 MG/G
GEL TOPICAL
Qty: 500 G | Refills: 1 | Status: SHIPPED | OUTPATIENT
Start: 2018-04-26 | End: 2018-07-09 | Stop reason: SDUPTHER

## 2018-04-26 RX ORDER — KETOROLAC TROMETHAMINE 30 MG/ML
15 INJECTION, SOLUTION INTRAMUSCULAR; INTRAVENOUS
Status: COMPLETED | OUTPATIENT
Start: 2018-04-26 | End: 2018-04-26

## 2018-04-26 RX ORDER — ERGOCALCIFEROL 1.25 MG/1
50000 CAPSULE ORAL
Qty: 24 CAPSULE | Refills: 0 | Status: SHIPPED | OUTPATIENT
Start: 2018-04-26 | End: 2018-07-09 | Stop reason: SDUPTHER

## 2018-04-26 RX ORDER — AMITRIPTYLINE HYDROCHLORIDE 25 MG/1
25 TABLET, FILM COATED ORAL NIGHTLY
Qty: 30 TABLET | Refills: 5 | Status: SHIPPED | OUTPATIENT
Start: 2018-04-26 | End: 2018-05-08 | Stop reason: SDUPTHER

## 2018-04-26 RX ORDER — BUPIVACAINE HYDROCHLORIDE 5 MG/ML
1 INJECTION, SOLUTION EPIDURAL; INTRACAUDAL
Status: COMPLETED | OUTPATIENT
Start: 2018-04-26 | End: 2018-04-26

## 2018-04-26 RX ORDER — KETOROLAC TROMETHAMINE 30 MG/ML
15 INJECTION, SOLUTION INTRAMUSCULAR; INTRAVENOUS
Status: DISCONTINUED | OUTPATIENT
Start: 2018-04-26 | End: 2018-04-26

## 2018-04-26 RX ORDER — FENTANYL 12.5 UG/1
1 PATCH TRANSDERMAL
Qty: 10 PATCH | Refills: 0 | Status: SHIPPED | OUTPATIENT
Start: 2018-04-26 | End: 2018-05-08 | Stop reason: SDUPTHER

## 2018-04-26 RX ORDER — HYDROCODONE BITARTRATE AND ACETAMINOPHEN 10; 325 MG/1; MG/1
1 TABLET ORAL EVERY 6 HOURS PRN
Qty: 120 TABLET | Refills: 0 | Status: SHIPPED | OUTPATIENT
Start: 2018-04-26 | End: 2018-05-08 | Stop reason: SDUPTHER

## 2018-04-26 RX ADMIN — KETOROLAC TROMETHAMINE 15 MG: 30 INJECTION, SOLUTION INTRAMUSCULAR at 03:04

## 2018-04-26 RX ADMIN — BUPIVACAINE HYDROCHLORIDE 5 MG: 5 INJECTION, SOLUTION EPIDURAL; INTRACAUDAL; PERINEURAL at 03:04

## 2018-04-26 RX ADMIN — METHYLPREDNISOLONE ACETATE 20 MG: 40 INJECTION, SUSPENSION INTRA-ARTICULAR; INTRALESIONAL; INTRAMUSCULAR; SOFT TISSUE at 03:04

## 2018-04-26 NOTE — PROGRESS NOTES
Subjective:       Patient ID: Cheyenne Leach is a 84 y.o. female.    Chief Complaint: Leg Pain (both legs) and Joint Swelling    Pt is a 84 y.o. female who presents for check up for Bilateral leg pain  Chronic pain syndrome  Fibromyalgia  Neuropathy of lower extremity, unspecified laterality. Doing well on current meds. Denies any side effects. Prevention is up to date.      Review of Systems   Musculoskeletal: Positive for arthralgias.          Terrible R ankle pain; can't stand her ankle pain  over the last 2 weeks       Objective:      Physical Exam   Constitutional: She is oriented to person, place, and time. She appears well-developed and well-nourished.   HENT:   Head: Normocephalic.   Eyes: Pupils are equal, round, and reactive to light.   Neck: Normal range of motion. Neck supple. No thyromegaly present.   Cardiovascular: Normal rate and regular rhythm.    Pulmonary/Chest: No respiratory distress. She has no wheezes. She has no rales. She exhibits no tenderness.   Abdominal: She exhibits no distension. There is no tenderness. There is no rebound and no guarding.   Musculoskeletal: She exhibits edema and tenderness.   R ankle with swelling and pain 10/10.   Lymphadenopathy:     She has no cervical adenopathy.   Neurological: She is alert and oriented to person, place, and time. She has normal reflexes. She displays normal reflexes. No cranial nerve deficit. She exhibits normal muscle tone. Coordination normal.   Skin: Skin is warm and dry. No rash noted. No pallor.   Psychiatric: She has a normal mood and affect. Judgment and thought content normal.       Assessment:       1. Bilateral leg pain    2. Chronic pain syndrome    3. Fibromyalgia    4. Neuropathy of lower extremity, unspecified laterality        Plan:   Cheyenne was seen today for leg pain and joint swelling.    Diagnoses and all orders for this visit:    Bilateral leg pain  -     diclofenac sodium 1 % Gel; APPLY TOPICALLY 4 TIMES A DAY AS  NEEDED  -     ergocalciferol (VITAMIN D2) 50,000 unit Cap; Take 1 capsule (50,000 Units total) by mouth every 7 days.  -     bupivacaine (PF) injection 5 mg; Inject 1 mL (5 mg total) into the articular space one time.  -     methylPREDNISolone acetate injection 20 mg; Inject 0.5 mLs (20 mg total) into the articular space one time.  -     ketorolac injection 15 mg; Inject 0.5 mLs (15 mg total) into the muscle one time.  -     fentaNYL (DURAGESIC) 12 mcg/hr PT72; Place 1 patch onto the skin every 72 hours.    Chronic pain syndrome  -     diclofenac sodium 1 % Gel; APPLY TOPICALLY 4 TIMES A DAY AS NEEDED  -     bupivacaine (PF) injection 5 mg; Inject 1 mL (5 mg total) into the articular space one time.  -     methylPREDNISolone acetate injection 20 mg; Inject 0.5 mLs (20 mg total) into the articular space one time.  -     ketorolac injection 15 mg; Inject 0.5 mLs (15 mg total) into the muscle one time.  -     fentaNYL (DURAGESIC) 12 mcg/hr PT72; Place 1 patch onto the skin every 72 hours.    Fibromyalgia  -     diclofenac sodium 1 % Gel; APPLY TOPICALLY 4 TIMES A DAY AS NEEDED  -     ergocalciferol (VITAMIN D2) 50,000 unit Cap; Take 1 capsule (50,000 Units total) by mouth every 7 days.  -     fentaNYL (DURAGESIC) 12 mcg/hr PT72; Place 1 patch onto the skin every 72 hours.    Neuropathy of lower extremity, unspecified laterality  -     ergocalciferol (VITAMIN D2) 50,000 unit Cap; Take 1 capsule (50,000 Units total) by mouth every 7 days.  -     fentaNYL (DURAGESIC) 12 mcg/hr PT72; Place 1 patch onto the skin every 72 hours.    Other orders  -     diltiaZEM (CARDIZEM) 60 MG tablet; Take one  Daily at supper  -     amitriptyline (ELAVIL) 25 MG tablet; Take 1 tablet (25 mg total) by mouth every evening.  -     hydrocodone-acetaminophen 10-325mg (NORCO)  mg Tab; Take 1 tablet by mouth every 6 (six) hours as needed.    Pt's R ankle was prepped with Betadine and 1cc Medrol, 1cc Marcaine, & 15 mg Toradol was injected  interarticularly into the L ankle joint space.

## 2018-04-30 RX ORDER — GABAPENTIN 300 MG/1
CAPSULE ORAL
Qty: 210 CAPSULE | Refills: 3 | Status: SHIPPED | OUTPATIENT
Start: 2018-04-30 | End: 2018-05-08 | Stop reason: SDUPTHER

## 2018-05-08 ENCOUNTER — OFFICE VISIT (OUTPATIENT)
Dept: FAMILY MEDICINE | Facility: CLINIC | Age: 83
End: 2018-05-08
Payer: MEDICARE

## 2018-05-08 VITALS
HEART RATE: 84 BPM | WEIGHT: 142.81 LBS | HEIGHT: 63 IN | BODY MASS INDEX: 25.3 KG/M2 | DIASTOLIC BLOOD PRESSURE: 82 MMHG | SYSTOLIC BLOOD PRESSURE: 132 MMHG

## 2018-05-08 DIAGNOSIS — G57.90 NEUROPATHY OF LOWER EXTREMITY, UNSPECIFIED LATERALITY: ICD-10-CM

## 2018-05-08 DIAGNOSIS — G89.4 CHRONIC PAIN SYNDROME: ICD-10-CM

## 2018-05-08 DIAGNOSIS — E03.4 HYPOTHYROIDISM DUE TO ACQUIRED ATROPHY OF THYROID: ICD-10-CM

## 2018-05-08 DIAGNOSIS — M79.605 BILATERAL LEG PAIN: ICD-10-CM

## 2018-05-08 DIAGNOSIS — M79.7 FIBROMYALGIA: ICD-10-CM

## 2018-05-08 DIAGNOSIS — K66.0 ABDOMINAL ADHESIONS: ICD-10-CM

## 2018-05-08 DIAGNOSIS — M79.604 BILATERAL LEG PAIN: ICD-10-CM

## 2018-05-08 PROCEDURE — 99213 OFFICE O/P EST LOW 20 MIN: CPT | Mod: PBBFAC | Performed by: FAMILY MEDICINE

## 2018-05-08 PROCEDURE — 99999 PR STA SHADOW: CPT | Mod: PBBFAC,,, | Performed by: FAMILY MEDICINE

## 2018-05-08 PROCEDURE — 99213 OFFICE O/P EST LOW 20 MIN: CPT | Mod: S$PBB | Performed by: FAMILY MEDICINE

## 2018-05-08 PROCEDURE — 99999 PR STA SHADOW: CPT | Mod: PBBFAC,,,

## 2018-05-08 PROCEDURE — 99999 PR PBB SHADOW E&M-EST. PATIENT-LVL III: CPT | Mod: PBBFAC,,, | Performed by: FAMILY MEDICINE

## 2018-05-08 PROCEDURE — 20605 DRAIN/INJ JOINT/BURSA W/O US: CPT | Mod: PBBFAC | Performed by: FAMILY MEDICINE

## 2018-05-08 RX ORDER — BUPIVACAINE HYDROCHLORIDE 5 MG/ML
1 INJECTION, SOLUTION EPIDURAL; INTRACAUDAL
Status: COMPLETED | OUTPATIENT
Start: 2018-05-08 | End: 2018-05-08

## 2018-05-08 RX ORDER — METHYLPREDNISOLONE ACETATE 40 MG/ML
10 INJECTION, SUSPENSION INTRA-ARTICULAR; INTRALESIONAL; INTRAMUSCULAR; SOFT TISSUE
Status: COMPLETED | OUTPATIENT
Start: 2018-05-08 | End: 2018-05-08

## 2018-05-08 RX ORDER — DILTIAZEM HYDROCHLORIDE 60 MG/1
TABLET, FILM COATED ORAL
Qty: 30 TABLET | Refills: 11 | Status: SHIPPED | OUTPATIENT
Start: 2018-05-08 | End: 2018-07-09 | Stop reason: SDUPTHER

## 2018-05-08 RX ORDER — OMEPRAZOLE 20 MG/1
20 CAPSULE, DELAYED RELEASE ORAL DAILY
Qty: 30 CAPSULE | Refills: 11 | Status: SHIPPED | OUTPATIENT
Start: 2018-05-08 | End: 2019-07-19 | Stop reason: SDUPTHER

## 2018-05-08 RX ORDER — LEVOTHYROXINE SODIUM 75 UG/1
TABLET ORAL
Qty: 90 TABLET | Refills: 3 | Status: SHIPPED | OUTPATIENT
Start: 2018-05-08 | End: 2018-07-09 | Stop reason: SDUPTHER

## 2018-05-08 RX ORDER — FENTANYL 12.5 UG/1
1 PATCH TRANSDERMAL
Qty: 10 PATCH | Refills: 0 | Status: SHIPPED | OUTPATIENT
Start: 2018-05-08 | End: 2018-07-09 | Stop reason: SDUPTHER

## 2018-05-08 RX ORDER — KETOROLAC TROMETHAMINE 30 MG/ML
7.5 INJECTION, SOLUTION INTRAMUSCULAR; INTRAVENOUS
Status: COMPLETED | OUTPATIENT
Start: 2018-05-08 | End: 2018-05-08

## 2018-05-08 RX ORDER — MEMANTINE HYDROCHLORIDE 5 MG/1
TABLET ORAL
Qty: 60 TABLET | Refills: 11 | Status: SHIPPED | OUTPATIENT
Start: 2018-05-08 | End: 2018-07-09 | Stop reason: SDUPTHER

## 2018-05-08 RX ORDER — AMITRIPTYLINE HYDROCHLORIDE 25 MG/1
25 TABLET, FILM COATED ORAL NIGHTLY
Qty: 30 TABLET | Refills: 5 | Status: SHIPPED | OUTPATIENT
Start: 2018-05-08 | End: 2019-04-01 | Stop reason: SDUPTHER

## 2018-05-08 RX ORDER — BUMETANIDE 1 MG/1
TABLET ORAL
Qty: 30 TABLET | Refills: 5 | Status: SHIPPED | OUTPATIENT
Start: 2018-05-08 | End: 2018-07-09 | Stop reason: SDUPTHER

## 2018-05-08 RX ORDER — HYDROCODONE BITARTRATE AND ACETAMINOPHEN 10; 325 MG/1; MG/1
1 TABLET ORAL EVERY 6 HOURS PRN
Qty: 120 TABLET | Refills: 0 | Status: SHIPPED | OUTPATIENT
Start: 2018-05-08 | End: 2018-06-06 | Stop reason: SDUPTHER

## 2018-05-08 RX ORDER — GABAPENTIN 300 MG/1
CAPSULE ORAL
Qty: 210 CAPSULE | Refills: 3 | Status: SHIPPED | OUTPATIENT
Start: 2018-05-08 | End: 2018-07-09 | Stop reason: SDUPTHER

## 2018-05-08 RX ORDER — DICLOFENAC SODIUM 10 MG/G
2 GEL TOPICAL DAILY
Qty: 5 TUBE | Refills: 5 | Status: SHIPPED | OUTPATIENT
Start: 2018-05-08 | End: 2018-07-09 | Stop reason: SDUPTHER

## 2018-05-08 RX ADMIN — BUPIVACAINE HYDROCHLORIDE 5 MG: 5 INJECTION, SOLUTION EPIDURAL; INTRACAUDAL; PERINEURAL at 10:05

## 2018-05-08 RX ADMIN — KETOROLAC TROMETHAMINE 7.5 MG: 30 INJECTION, SOLUTION INTRAMUSCULAR at 10:05

## 2018-05-08 RX ADMIN — METHYLPREDNISOLONE ACETATE 10 MG: 40 INJECTION, SUSPENSION INTRA-ARTICULAR; INTRALESIONAL; INTRAMUSCULAR; SOFT TISSUE at 10:05

## 2018-05-08 NOTE — PROGRESS NOTES
Subjective:       Patient ID: Cheyenne Leach is a 84 y.o. female.    Chief Complaint: Follow-up    Pt is a 84 y.o. female who presents for check up for chronic leg pain  Bilateral leg pain  Hypothyroidism due to acquired atrophy of thyroid  Chronic pain syndrome  Fibromyalgia  Neuropathy of lower extremity, unspecified laterality. Doing well on current meds. Denies any side effects. Prevention is up to date.      Review of Systems   Constitutional: Negative for appetite change, chills and fever.   HENT: Negative for rhinorrhea, sinus pressure, sore throat and trouble swallowing.    Respiratory: Negative for cough, chest tightness, shortness of breath and wheezing.    Cardiovascular: Negative for chest pain and palpitations.   Gastrointestinal: Negative for abdominal pain, blood in stool, diarrhea, nausea and vomiting.   Genitourinary: Negative for dysuria, flank pain, hematuria, pelvic pain, urgency, vaginal bleeding, vaginal discharge and vaginal pain.   Musculoskeletal: Positive for arthralgias, gait problem and myalgias. Negative for back pain, joint swelling and neck stiffness.        R ankle is  and painful almost all the time- 50 % better   Skin: Negative for rash.   Neurological: Negative for dizziness, weakness, light-headedness, numbness and headaches.   Hematological: Does not bruise/bleed easily.   Psychiatric/Behavioral: Negative for agitation. The patient is not nervous/anxious.        Objective:      Physical Exam   Constitutional: She is oriented to person, place, and time. She appears well-developed and well-nourished.   HENT:   Head: Normocephalic.   Eyes: Pupils are equal, round, and reactive to light.   Neck: Normal range of motion. Neck supple. No thyromegaly present.   Cardiovascular: Normal rate and regular rhythm.    Pulmonary/Chest: No respiratory distress. She has no wheezes. She has no rales. She exhibits no tenderness.   Abdominal: She exhibits no distension. There is no  tenderness. There is no rebound and no guarding.   Musculoskeletal: She exhibits tenderness. She exhibits no edema.   R ankle with soreness   Lymphadenopathy:     She has no cervical adenopathy.   Neurological: She is alert and oriented to person, place, and time. She has normal reflexes. She displays normal reflexes. No cranial nerve deficit. She exhibits normal muscle tone. Coordination normal.   Skin: Skin is warm and dry. No rash noted. No pallor.   Psychiatric: She has a normal mood and affect. Judgment and thought content normal.       Assessment:       1. Abdominal adhesions    2. Bilateral leg pain    3. Hypothyroidism due to acquired atrophy of thyroid    4. Chronic pain syndrome    5. Fibromyalgia    6. Neuropathy of lower extremity, unspecified laterality        Plan:   Cheyenne was seen today for follow-up.    Diagnoses and all orders for this visit:    Abdominal adhesions  -     omeprazole (PRILOSEC) 20 MG capsule; Take 1 capsule (20 mg total) by mouth once daily. 1 Capsule(s) Oral PRN Every day.    Bilateral leg pain  -     bumetanide (BUMEX) 1 MG tablet;   -     fentaNYL (DURAGESIC) 12 mcg/hr PT72; Place 1 patch onto the skin every 72 hours.    Hypothyroidism due to acquired atrophy of thyroid  -     levothyroxine (SYNTHROID) 75 MCG tablet;     Chronic pain syndrome  -     fentaNYL (DURAGESIC) 12 mcg/hr PT72; Place 1 patch onto the skin every 72 hours.    Fibromyalgia  -     fentaNYL (DURAGESIC) 12 mcg/hr PT72; Place 1 patch onto the skin every 72 hours.    Neuropathy of lower extremity, unspecified laterality  -     fentaNYL (DURAGESIC) 12 mcg/hr PT72; Place 1 patch onto the skin every 72 hours.    Other orders  -     hydrocodone-acetaminophen 10-325mg (NORCO)  mg Tab; Take 1 tablet by mouth every 6 (six) hours as needed.  -     amitriptyline (ELAVIL) 25 MG tablet; Take 1 tablet (25 mg total) by mouth every evening.  -     diltiaZEM (CARDIZEM) 60 MG tablet; Take one  Daily at supper  -      gabapentin (NEURONTIN) 300 MG capsule;   -     memantine (NAMENDA) 5 MG Tab; Take one nightly for 6 weeks, then one BID    Pt's R ankle was prepped with Betadine and o mg Medrol, 1cc Marcaine, & 7.5 mg Toradol was injected interarticularly into the R ankle mortise joint.

## 2018-05-19 ENCOUNTER — TELEPHONE (OUTPATIENT)
Dept: FAMILY MEDICINE | Facility: CLINIC | Age: 83
End: 2018-05-19

## 2018-05-19 NOTE — TELEPHONE ENCOUNTER
Lisha with [facility] in [place] called states Diltiazem and Fentanyl interaction. Pharmacist states it may increase Fentanyl side effects.     Pharmacy: [facility] in [place]  Call [phone]

## 2018-05-19 NOTE — TELEPHONE ENCOUNTER
----- Message from George Rao sent at 2018 10:51 AM CDT -----  Contact: Perri @ [facility] Craig Leach  MRN: [id number]  : 7/3/1933  PCP: Tomasz Lynch  Home Phone      [phone]  Work Phone      Not on file.  Mobile          Not on file.      MESSAGE: would like to speak with nurse Re: Rx    Call [phone]    PCP: Syed

## 2018-06-06 RX ORDER — HYDROCODONE BITARTRATE AND ACETAMINOPHEN 10; 325 MG/1; MG/1
1 TABLET ORAL EVERY 6 HOURS PRN
Qty: 120 TABLET | Refills: 0 | Status: SHIPPED | OUTPATIENT
Start: 2018-06-06 | End: 2018-07-09 | Stop reason: SDUPTHER

## 2018-06-06 NOTE — TELEPHONE ENCOUNTER
----- Message from Stacey Tuttle MA sent at 2018  1:10 PM CDT -----  Contact: Matias Leach  MRN: [id number]  : 7/3/1933  PCP: Tomasz Lynch  Home Phone      [phone]  Work Phone      Not on file.  Mobile          Not on file.      MESSAGE:   Pt requesting refill or new Rx.   Is this a refill or new RX:  Refill  RX name and strength: Norco  Last office visit: 18  Is this a 30-day or 90-day RX:  30  Pharm[facility] and location:  [facility]  Comments:  Please send to available provider    Phone:  [phone]

## 2018-07-09 ENCOUNTER — OFFICE VISIT (OUTPATIENT)
Dept: FAMILY MEDICINE | Facility: CLINIC | Age: 83
End: 2018-07-09
Payer: MEDICARE

## 2018-07-09 VITALS
HEART RATE: 82 BPM | SYSTOLIC BLOOD PRESSURE: 140 MMHG | RESPIRATION RATE: 16 BRPM | HEIGHT: 63 IN | DIASTOLIC BLOOD PRESSURE: 78 MMHG | WEIGHT: 143.06 LBS | BODY MASS INDEX: 25.35 KG/M2

## 2018-07-09 DIAGNOSIS — G89.4 CHRONIC PAIN SYNDROME: ICD-10-CM

## 2018-07-09 DIAGNOSIS — M79.7 FIBROMYALGIA: ICD-10-CM

## 2018-07-09 DIAGNOSIS — I10 ESSENTIAL HYPERTENSION: ICD-10-CM

## 2018-07-09 DIAGNOSIS — E03.4 HYPOTHYROIDISM DUE TO ACQUIRED ATROPHY OF THYROID: ICD-10-CM

## 2018-07-09 DIAGNOSIS — M79.605 BILATERAL LEG PAIN: ICD-10-CM

## 2018-07-09 DIAGNOSIS — G57.90 NEUROPATHY OF LOWER EXTREMITY, UNSPECIFIED LATERALITY: ICD-10-CM

## 2018-07-09 DIAGNOSIS — M79.604 BILATERAL LEG PAIN: ICD-10-CM

## 2018-07-09 PROCEDURE — 99213 OFFICE O/P EST LOW 20 MIN: CPT | Mod: S$PBB | Performed by: FAMILY MEDICINE

## 2018-07-09 PROCEDURE — 99999 PR PBB SHADOW E&M-EST. PATIENT-LVL III: CPT | Mod: PBBFAC,,, | Performed by: FAMILY MEDICINE

## 2018-07-09 PROCEDURE — 99999 PR STA SHADOW: CPT | Mod: PBBFAC,,, | Performed by: FAMILY MEDICINE

## 2018-07-09 PROCEDURE — 99213 OFFICE O/P EST LOW 20 MIN: CPT | Mod: PBBFAC | Performed by: FAMILY MEDICINE

## 2018-07-09 RX ORDER — LEVOTHYROXINE SODIUM 75 UG/1
TABLET ORAL
Qty: 90 TABLET | Refills: 3 | Status: SHIPPED | OUTPATIENT
Start: 2018-07-09 | End: 2019-07-19 | Stop reason: SDUPTHER

## 2018-07-09 RX ORDER — ERGOCALCIFEROL 1.25 MG/1
50000 CAPSULE ORAL
Qty: 24 CAPSULE | Refills: 1 | Status: SHIPPED | OUTPATIENT
Start: 2018-07-09 | End: 2019-07-19 | Stop reason: SDUPTHER

## 2018-07-09 RX ORDER — GABAPENTIN 300 MG/1
CAPSULE ORAL
Qty: 210 CAPSULE | Refills: 3 | Status: SHIPPED | OUTPATIENT
Start: 2018-07-09 | End: 2019-04-01 | Stop reason: SDUPTHER

## 2018-07-09 RX ORDER — HYDROCODONE BITARTRATE AND ACETAMINOPHEN 10; 325 MG/1; MG/1
1 TABLET ORAL EVERY 6 HOURS PRN
Qty: 120 TABLET | Refills: 0 | Status: SHIPPED | OUTPATIENT
Start: 2018-07-09 | End: 2018-08-07 | Stop reason: SDUPTHER

## 2018-07-09 RX ORDER — MEMANTINE HYDROCHLORIDE 5 MG/1
TABLET ORAL
Qty: 180 TABLET | Refills: 3 | Status: SHIPPED | OUTPATIENT
Start: 2018-07-09 | End: 2019-02-12

## 2018-07-09 RX ORDER — FENTANYL 12.5 UG/1
1 PATCH TRANSDERMAL
Qty: 10 PATCH | Refills: 0 | Status: SHIPPED | OUTPATIENT
Start: 2018-07-09 | End: 2018-11-06 | Stop reason: ALTCHOICE

## 2018-07-09 RX ORDER — LOSARTAN POTASSIUM 100 MG/1
TABLET ORAL
Qty: 90 TABLET | Refills: 3 | Status: SHIPPED | OUTPATIENT
Start: 2018-07-09 | End: 2018-11-06 | Stop reason: SDUPTHER

## 2018-07-09 RX ORDER — DILTIAZEM HYDROCHLORIDE 60 MG/1
TABLET, FILM COATED ORAL
Qty: 90 TABLET | Refills: 3 | Status: SHIPPED | OUTPATIENT
Start: 2018-07-09 | End: 2018-11-06 | Stop reason: ALTCHOICE

## 2018-07-09 RX ORDER — DICLOFENAC SODIUM 10 MG/G
GEL TOPICAL
Qty: 500 G | Refills: 3 | Status: SHIPPED | OUTPATIENT
Start: 2018-07-09 | End: 2018-07-09 | Stop reason: SDUPTHER

## 2018-07-09 RX ORDER — BUMETANIDE 1 MG/1
TABLET ORAL
Qty: 90 TABLET | Refills: 3 | Status: SHIPPED | OUTPATIENT
Start: 2018-07-09 | End: 2018-07-12 | Stop reason: SDUPTHER

## 2018-07-09 RX ORDER — DICLOFENAC SODIUM 10 MG/G
GEL TOPICAL
Qty: 5 TUBE | Refills: 3 | Status: SHIPPED | OUTPATIENT
Start: 2018-07-09 | End: 2018-08-07 | Stop reason: SDUPTHER

## 2018-07-09 RX ORDER — CLONIDINE HYDROCHLORIDE 0.1 MG/1
0.05 TABLET ORAL 2 TIMES DAILY
Qty: 180 TABLET | Refills: 3 | Status: SHIPPED | OUTPATIENT
Start: 2018-07-09 | End: 2019-04-01

## 2018-07-09 RX ORDER — BACLOFEN 10 MG/1
TABLET ORAL
Qty: 90 TABLET | Refills: 3 | Status: SHIPPED | OUTPATIENT
Start: 2018-07-09 | End: 2018-07-17

## 2018-07-09 NOTE — PROGRESS NOTES
Subjective:       Patient ID: Cheyenne Leach is a 85 y.o. female.    Chief Complaint: Pain    Pt is a 85 y.o. female who presents for check up for Bilateral leg pain  Essential hypertension  Chronic pain syndrome  Fibromyalgia  Neuropathy of lower extremity, unspecified laterality  Hypothyroidism due to acquired atrophy of thyroid. Doing well on current meds. Denies any side effects.  Pt is a 85 y.o. female who presents for check up for Bilateral leg pain  Essential hypertension  Chronic pain syndrome  Fibromyalgia  Neuropathy of lower extremity, unspecified laterality  Hypothyroidism due to acquired atrophy of thyroid. Doing well on current meds. Denies any side effects. Prevention is up to date.      Pain   Associated symptoms include arthralgias. Pertinent negatives include no abdominal pain, chest pain, chills, coughing, fever, headaches, joint swelling, nausea, numbness, rash, sore throat, vomiting or weakness.     Review of Systems   Constitutional: Negative for appetite change, chills and fever.   HENT: Negative for rhinorrhea, sinus pressure, sore throat and trouble swallowing.    Respiratory: Negative for cough, chest tightness, shortness of breath and wheezing.    Cardiovascular: Negative for chest pain and palpitations.   Gastrointestinal: Negative for abdominal pain, blood in stool, diarrhea, nausea and vomiting.   Genitourinary: Negative for dysuria, flank pain, hematuria, pelvic pain, urgency, vaginal bleeding, vaginal discharge and vaginal pain.   Musculoskeletal: Positive for arthralgias and gait problem. Negative for back pain, joint swelling and neck stiffness.        Legs ache at times; hips ache plenty   Skin: Negative for rash.   Neurological: Negative for dizziness, weakness, light-headedness, numbness and headaches.   Hematological: Does not bruise/bleed easily.   Psychiatric/Behavioral: Negative for agitation. The patient is not nervous/anxious.        Objective:      Physical Exam    Constitutional: She is oriented to person, place, and time. She appears well-developed and well-nourished.   HENT:   Head: Normocephalic.   Eyes: Pupils are equal, round, and reactive to light.   Neck: Normal range of motion. Neck supple. No thyromegaly present.   Cardiovascular: Normal rate and regular rhythm.    Pulmonary/Chest: No respiratory distress. She has no wheezes. She has no rales. She exhibits no tenderness.   Abdominal: She exhibits no distension. There is no tenderness. There is no rebound and no guarding.   Musculoskeletal: Normal range of motion. She exhibits no edema or tenderness.   Lymphadenopathy:     She has no cervical adenopathy.   Neurological: She is alert and oriented to person, place, and time. She has normal reflexes. She displays normal reflexes. No cranial nerve deficit. She exhibits normal muscle tone. Coordination normal.   Skin: Skin is warm and dry. No rash noted. No pallor.   Psychiatric: She has a normal mood and affect. Judgment and thought content normal.       Assessment:       1. Bilateral leg pain    2. Essential hypertension    3. Chronic pain syndrome    4. Fibromyalgia    5. Neuropathy of lower extremity, unspecified laterality    6. Hypothyroidism due to acquired atrophy of thyroid        Plan:   Cheyenne was seen today for pain.    Diagnoses and all orders for this visit:    Bilateral leg pain  -     bumetanide (BUMEX) 1 MG tablet; TAKE FOR SEVERE SWELLING OF HER LEGS  -     diclofenac sodium 1 % Gel; APPLY TOPICALLY 4 TIMES A DAY AS NEEDED  -     ergocalciferol (VITAMIN D2) 50,000 unit Cap; Take 1 capsule (50,000 Units total) by mouth every 7 days.  -     fentaNYL (DURAGESIC) 12 mcg/hr PT72; Place 1 patch onto the skin every 72 hours.    Essential hypertension  -     cloNIDine (CATAPRES) 0.1 MG tablet; Take 0.5 tablets (0.05 mg total) by mouth 2 (two) times daily.    Chronic pain syndrome  -     diclofenac sodium 1 % Gel; APPLY TOPICALLY 4 TIMES A DAY AS NEEDED  -      fentaNYL (DURAGESIC) 12 mcg/hr PT72; Place 1 patch onto the skin every 72 hours.    Fibromyalgia  -     diclofenac sodium 1 % Gel; APPLY TOPICALLY 4 TIMES A DAY AS NEEDED  -     ergocalciferol (VITAMIN D2) 50,000 unit Cap; Take 1 capsule (50,000 Units total) by mouth every 7 days.  -     fentaNYL (DURAGESIC) 12 mcg/hr PT72; Place 1 patch onto the skin every 72 hours.    Neuropathy of lower extremity, unspecified laterality  -     ergocalciferol (VITAMIN D2) 50,000 unit Cap; Take 1 capsule (50,000 Units total) by mouth every 7 days.  -     fentaNYL (DURAGESIC) 12 mcg/hr PT72; Place 1 patch onto the skin every 72 hours.    Hypothyroidism due to acquired atrophy of thyroid  -     levothyroxine (SYNTHROID) 75 MCG tablet; TAKE 1 TABLET (75 MCG TOTAL) BY MOUTH ONCE DAILY.    Other orders  -     baclofen (LIORESAL) 10 MG tablet; Take 1/2 nightly for 2 weeks, then may increase to 1 tablet nightly.  -     diltiaZEM (CARDIZEM) 60 MG tablet; Take one  Daily at supper  -     gabapentin (NEURONTIN) 300 MG capsule; TAKE 2 CAPSULE BY MOUTH BREAKFAST AND LUNCH AND 3 AT BEDTIME  -     losartan (COZAAR) 100 MG tablet; Take one in the AM for HTN  -     memantine (NAMENDA) 5 MG Tab; Take one nightly for 6 weeks, then one BID  -     HYDROcodone-acetaminophen (NORCO)  mg per tablet; Take 1 tablet by mouth every 6 (six) hours as needed.

## 2018-07-12 ENCOUNTER — TELEPHONE (OUTPATIENT)
Dept: FAMILY MEDICINE | Facility: CLINIC | Age: 83
End: 2018-07-12

## 2018-07-12 DIAGNOSIS — M79.604 BILATERAL LEG PAIN: ICD-10-CM

## 2018-07-12 DIAGNOSIS — M79.605 BILATERAL LEG PAIN: ICD-10-CM

## 2018-07-12 RX ORDER — BUMETANIDE 1 MG/1
TABLET ORAL
Qty: 90 TABLET | Refills: 3 | Status: SHIPPED | OUTPATIENT
Start: 2018-07-12 | End: 2018-11-06 | Stop reason: SDUPTHER

## 2018-07-12 NOTE — TELEPHONE ENCOUNTER
Received Clarification /re-send script SIG:state take for severe swelling of her legs .Please resend with his legs.Please review and advise.Thank you

## 2018-07-17 ENCOUNTER — HOSPITAL ENCOUNTER (OUTPATIENT)
Dept: RADIOLOGY | Facility: HOSPITAL | Age: 83
Discharge: HOME | End: 2018-07-17
Attending: PSYCHIATRY & NEUROLOGY
Payer: MEDICARE

## 2018-07-17 ENCOUNTER — OFFICE VISIT (OUTPATIENT)
Dept: NEUROLOGY | Facility: CLINIC | Age: 83
End: 2018-07-17
Payer: MEDICARE

## 2018-07-17 VITALS
HEART RATE: 66 BPM | BODY MASS INDEX: 25.16 KG/M2 | WEIGHT: 142 LBS | DIASTOLIC BLOOD PRESSURE: 84 MMHG | HEIGHT: 63 IN | RESPIRATION RATE: 16 BRPM | SYSTOLIC BLOOD PRESSURE: 138 MMHG

## 2018-07-17 DIAGNOSIS — E03.9 ACQUIRED HYPOTHYROIDISM: ICD-10-CM

## 2018-07-17 DIAGNOSIS — M79.7 FIBROMYALGIA: ICD-10-CM

## 2018-07-17 DIAGNOSIS — M96.1 FAILED BACK SYNDROME, LUMBAR: ICD-10-CM

## 2018-07-17 DIAGNOSIS — G31.84 MCI (MILD COGNITIVE IMPAIRMENT): Primary | ICD-10-CM

## 2018-07-17 DIAGNOSIS — S36.69XA RECTAL TEAR: ICD-10-CM

## 2018-07-17 PROCEDURE — 99214 OFFICE O/P EST MOD 30 MIN: CPT | Mod: S$PBB | Performed by: PSYCHIATRY & NEUROLOGY

## 2018-07-17 PROCEDURE — 99999 PR PBB SHADOW E&M-EST. PATIENT-LVL III: CPT | Mod: PBBFAC,,, | Performed by: PSYCHIATRY & NEUROLOGY

## 2018-07-17 PROCEDURE — 99999 PR STA SHADOW: CPT | Mod: PBBFAC,,, | Performed by: PSYCHIATRY & NEUROLOGY

## 2018-07-17 PROCEDURE — 99213 OFFICE O/P EST LOW 20 MIN: CPT | Mod: PBBFAC,25 | Performed by: PSYCHIATRY & NEUROLOGY

## 2018-07-17 PROCEDURE — 72100 X-RAY EXAM L-S SPINE 2/3 VWS: CPT | Mod: 26,,, | Performed by: RADIOLOGY

## 2018-07-17 PROCEDURE — 72100 X-RAY EXAM L-S SPINE 2/3 VWS: CPT | Mod: TC

## 2018-07-17 NOTE — PROGRESS NOTES
HPI:Cheyenne Leach is a 85 y.o. female s/p lumbar surgery now with episode of visual loss (bilateral) and leg weakness bilaterally in 5/11. TIA possibly and work up for this and other etiologies was largey normal. Mild memory dysfunction noted along with  hypothyroidism  --now memory improved with correction of hypothyroidism.    Since the last visit, she saw optometry for blurred vision. She did get new glasses which helps.     Balcofen was tried for cramps in the legs, but she is not sure of the results.  She states she is having pain which is worse. Her legs, hips and back are all hurting for the past 2-3 months.   She was given injections int he heals by PCP.   Pain seems to start in the lower back and shoot down to the buttocks and hip and the travels down to the posterior legs   She had a rectal tear a year ago which is now less active/seen by colorectal surgery prior    Review of Systems   Constitutional: Negative for fever.   HENT: Negative for nosebleeds.    Eyes: Negative for double vision.   Respiratory: Negative for hemoptysis.    Cardiovascular: Negative for palpitations.   Gastrointestinal: Negative for blood in stool.   Genitourinary: Negative for hematuria.   Musculoskeletal: Positive for back pain and myalgias.   Skin: Negative for rash.   Neurological: Negative for tremors and headaches.   Psychiatric/Behavioral: Positive for memory loss.        Memory loss is stable       Exam:   Gen Appearance, well developed/nourished in no apparent distress  CV: 2+ distal pulses with no edema or swelling except for trace dependent edema  Neuro:  MS: Awake, alert, Sustains attention. She is is fully oriented,Recent recall is good. /remote memory intact, Language is full to spontaneous speech/comprehension. Fund of Knowledge is full and she can tell me her recent events   She has good insight and judgement about her memory and living arrangements  CN:  PERRL, Extraoccular movements and visual fields are full.  Normal facial sensation and strength, Tongue and Palate are midline and strong. Shoulder Shrug symmetric and strong.  Motor: Normal bulk, tone, no abnormal movements. No protonator drift and 5/5 bilateral UE and LE strength and 1+ reflexes  Sensory: Romberg negative and intact to temp and vibration  Cerebellar: Finger-nose, Rapid alternating movements intact  Gait: Normal stance, no ataxia    Labs: 11/2016 TSH normal   2017 CMP unremarkable  Imaging: 3/2014 MRI brain: 1. No MRI evidence of an acute intracranial abnormality.  2. Atrophy and small vessel ischemic changes of the periventricular white matter.    Assessment/Recommendation:  Cheyenne Leach is a 85 y.o. female s/p lumbar surgery now with episode of visual loss (bilateral) and leg weakness bilaterally in 5/11. TIA possibly and work up for this and other etiologies was largey normal. Mild memory dysfunction noted along with  hypothyroidism  --now memory improved with correction of hypothyroidism. I recommend:   1. Xray lower back today given her worse lumbar radicular pain.     2. 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  She also has fibromyalgia and takes elavil, opiates,  and gabapentin for pain via PCP. Not sure if baclofen is helping.   3. Continue levothyroxine with PCP-- Dr Lynch - needs TSH updated per labs today)  4. At worse, she has very mild cognitive impairment, but is functioing very well currently and has been stable over the years but symptoms continue to bother her. Continue Namenda for memory loss complaint which she states helps.   No restrictions given currently- will monitor. Continue physical exercise and mental exercises for MCI  5. She continue 81mg ASA and ARB for HTN /all for CVA prevention. She had a rectal tear a year ago which is currently not very bothersome/ seen by colorectal surgery prior    RTC  6 months

## 2018-08-07 ENCOUNTER — OFFICE VISIT (OUTPATIENT)
Dept: FAMILY MEDICINE | Facility: CLINIC | Age: 83
End: 2018-08-07
Payer: MEDICARE

## 2018-08-07 VITALS
DIASTOLIC BLOOD PRESSURE: 82 MMHG | SYSTOLIC BLOOD PRESSURE: 136 MMHG | HEIGHT: 63 IN | WEIGHT: 145.63 LBS | HEART RATE: 80 BPM | BODY MASS INDEX: 25.8 KG/M2

## 2018-08-07 DIAGNOSIS — K59.09 CHRONIC CONSTIPATION: Primary | ICD-10-CM

## 2018-08-07 DIAGNOSIS — M79.604 BILATERAL LEG PAIN: ICD-10-CM

## 2018-08-07 DIAGNOSIS — M79.605 BILATERAL LEG PAIN: ICD-10-CM

## 2018-08-07 DIAGNOSIS — M79.7 FIBROMYALGIA: ICD-10-CM

## 2018-08-07 DIAGNOSIS — G89.4 CHRONIC PAIN SYNDROME: ICD-10-CM

## 2018-08-07 PROCEDURE — 99212 OFFICE O/P EST SF 10 MIN: CPT | Mod: PBBFAC | Performed by: FAMILY MEDICINE

## 2018-08-07 PROCEDURE — 99999 PR STA SHADOW: CPT | Mod: PBBFAC,,, | Performed by: FAMILY MEDICINE

## 2018-08-07 PROCEDURE — 99999 PR PBB SHADOW E&M-EST. PATIENT-LVL II: CPT | Mod: PBBFAC,,, | Performed by: FAMILY MEDICINE

## 2018-08-07 PROCEDURE — 99213 OFFICE O/P EST LOW 20 MIN: CPT | Mod: S$PBB | Performed by: FAMILY MEDICINE

## 2018-08-07 RX ORDER — HYDROCODONE BITARTRATE AND ACETAMINOPHEN 10; 325 MG/1; MG/1
1 TABLET ORAL 2 TIMES DAILY PRN
Qty: 60 TABLET | Refills: 0 | Status: SHIPPED | OUTPATIENT
Start: 2018-10-07 | End: 2018-08-07 | Stop reason: SDUPTHER

## 2018-08-07 RX ORDER — HYDROCODONE BITARTRATE AND ACETAMINOPHEN 10; 325 MG/1; MG/1
1 TABLET ORAL EVERY 6 HOURS PRN
Qty: 120 TABLET | Refills: 0 | Status: SHIPPED | OUTPATIENT
Start: 2018-10-07 | End: 2018-11-06 | Stop reason: SDUPTHER

## 2018-08-07 RX ORDER — CYCLOBENZAPRINE HCL 10 MG
10 TABLET ORAL NIGHTLY
Qty: 30 TABLET | Refills: 5 | Status: SHIPPED | OUTPATIENT
Start: 2018-08-07 | End: 2018-08-17

## 2018-08-07 RX ORDER — DICLOFENAC SODIUM 10 MG/G
GEL TOPICAL
Qty: 5 TUBE | Refills: 3 | Status: SHIPPED | OUTPATIENT
Start: 2018-08-07 | End: 2018-11-06 | Stop reason: SDUPTHER

## 2018-08-07 RX ORDER — HYDROCODONE BITARTRATE AND ACETAMINOPHEN 10; 325 MG/1; MG/1
1 TABLET ORAL EVERY 6 HOURS PRN
Qty: 120 TABLET | Refills: 0 | Status: SHIPPED | OUTPATIENT
Start: 2018-08-07 | End: 2018-09-21 | Stop reason: SDUPTHER

## 2018-08-07 RX ORDER — HYDROCODONE BITARTRATE AND ACETAMINOPHEN 10; 325 MG/1; MG/1
1 TABLET ORAL EVERY 6 HOURS PRN
Qty: 120 TABLET | Refills: 0 | Status: SHIPPED | OUTPATIENT
Start: 2018-09-07 | End: 2018-11-06 | Stop reason: SDUPTHER

## 2018-08-07 RX ORDER — HYDROCODONE BITARTRATE AND ACETAMINOPHEN 10; 325 MG/1; MG/1
1 TABLET ORAL 2 TIMES DAILY PRN
Qty: 60 TABLET | Refills: 0 | Status: SHIPPED | OUTPATIENT
Start: 2018-09-07 | End: 2018-08-07 | Stop reason: SDUPTHER

## 2018-08-07 NOTE — PROGRESS NOTES
Subjective:       Patient ID: Cheyenne Leach is a 85 y.o. female.    Chief Complaint: No chief complaint on file.    Pt is a 85 y.o. female who presents for check up for Bilateral leg pain  Chronic pain syndrome  Fibromyalgia. Doing well on current meds. Denies any side effects. Prevention is up to date.      Review of Systems   Constitutional: Negative for appetite change, chills and fever.   HENT: Negative for rhinorrhea, sinus pressure, sore throat and trouble swallowing.    Respiratory: Negative for cough, chest tightness, shortness of breath and wheezing.    Cardiovascular: Negative for chest pain and palpitations.   Gastrointestinal: Negative for abdominal pain, blood in stool, diarrhea, nausea and vomiting.   Genitourinary: Negative for dysuria, flank pain, hematuria, pelvic pain, urgency, vaginal bleeding, vaginal discharge and vaginal pain.   Musculoskeletal: Positive for arthralgias and back pain. Negative for joint swelling and neck stiffness.        Chronic pain and leg pains   Skin: Negative for rash.   Neurological: Negative for dizziness, weakness, light-headedness, numbness and headaches.   Hematological: Does not bruise/bleed easily.   Psychiatric/Behavioral: Negative for agitation. The patient is not nervous/anxious.        Objective:      Physical Exam   Constitutional: She is oriented to person, place, and time. She appears well-developed and well-nourished.   HENT:   Head: Normocephalic.   Eyes: Pupils are equal, round, and reactive to light.   Neck: Normal range of motion. Neck supple. No thyromegaly present.   Cardiovascular: Normal rate and regular rhythm.    Pulmonary/Chest: No respiratory distress. She has no wheezes. She has no rales. She exhibits no tenderness.   Abdominal: She exhibits no distension. There is no tenderness. There is no rebound and no guarding.   Musculoskeletal: Normal range of motion. She exhibits no edema or tenderness.   Lymphadenopathy:     She has no cervical  adenopathy.   Neurological: She is alert and oriented to person, place, and time. She has normal reflexes. She displays normal reflexes. No cranial nerve deficit. She exhibits normal muscle tone. Coordination normal.   Skin: Skin is warm and dry. No rash noted. No pallor.   Psychiatric: She has a normal mood and affect. Judgment and thought content normal.       Assessment:       1. Chronic constipation    2. Bilateral leg pain    3. Chronic pain syndrome    4. Fibromyalgia        Plan:   Diagnoses and all orders for this visit:    Chronic constipation    Bilateral leg pain  -     diclofenac sodium 1 % Gel; APPLY TOPICALLY 4 TIMES A DAY AS NEEDED    Chronic pain syndrome  -     diclofenac sodium 1 % Gel; APPLY TOPICALLY 4 TIMES A DAY AS NEEDED    Fibromyalgia  -     diclofenac sodium 1 % Gel; APPLY TOPICALLY 4 TIMES A DAY AS NEEDED    Other orders  -     HYDROcodone-acetaminophen (NORCO)  mg per tablet; Take 1 tablet by mouth every 6 (six) hours as needed.  -     Discontinue: HYDROcodone-acetaminophen (NORCO)  mg per tablet; Take 1 tablet by mouth 2 (two) times daily as needed.  -     Discontinue: HYDROcodone-acetaminophen (NORCO)  mg per tablet; Take 1 tablet by mouth 2 (two) times daily as needed.  -     HYDROcodone-acetaminophen (NORCO)  mg per tablet; Take 1 tablet by mouth every 6 (six) hours as needed.  -     HYDROcodone-acetaminophen (NORCO)  mg per tablet; Take 1 tablet by mouth every 6 (six) hours as needed.

## 2018-08-10 ENCOUNTER — OFFICE VISIT (OUTPATIENT)
Dept: PAIN MEDICINE | Facility: CLINIC | Age: 83
End: 2018-08-10
Attending: PAIN MEDICINE
Payer: MEDICARE

## 2018-08-10 VITALS
HEIGHT: 63 IN | RESPIRATION RATE: 16 BRPM | SYSTOLIC BLOOD PRESSURE: 148 MMHG | HEART RATE: 72 BPM | WEIGHT: 145.81 LBS | DIASTOLIC BLOOD PRESSURE: 84 MMHG | BODY MASS INDEX: 25.84 KG/M2

## 2018-08-10 DIAGNOSIS — M51.36 DDD (DEGENERATIVE DISC DISEASE), LUMBAR: Primary | ICD-10-CM

## 2018-08-10 DIAGNOSIS — G89.4 CHRONIC PAIN DISORDER: ICD-10-CM

## 2018-08-10 DIAGNOSIS — M96.1 POSTLAMINECTOMY SYNDROME OF LUMBAR REGION: ICD-10-CM

## 2018-08-10 DIAGNOSIS — M48.062 SPINAL STENOSIS OF LUMBAR REGION WITH NEUROGENIC CLAUDICATION: ICD-10-CM

## 2018-08-10 DIAGNOSIS — M47.816 LUMBAR SPONDYLOSIS: ICD-10-CM

## 2018-08-10 PROCEDURE — 99213 OFFICE O/P EST LOW 20 MIN: CPT | Mod: PBBFAC | Performed by: PAIN MEDICINE

## 2018-08-10 PROCEDURE — 99203 OFFICE O/P NEW LOW 30 MIN: CPT | Mod: S$PBB,,, | Performed by: PAIN MEDICINE

## 2018-08-10 PROCEDURE — 99999 PR PBB SHADOW E&M-EST. PATIENT-LVL III: CPT | Mod: PBBFAC,,, | Performed by: PAIN MEDICINE

## 2018-08-10 NOTE — LETTER
August 10, 2018      Scott Russell MD  [address]  [facility] [address]           [facility]  [address] [facility] [address]  Phone: [phone]  Fax: [phone]          Patient: Cheyenne Leach   MR Number: [id number]   YOB: 1933   Date of Visit: 8/10/2018       Dear Dr. Scott Russell:    Thank you for referring Cheyenne Leach to me for evaluation. Attached you will find relevant portions of my assessment and plan of care.    If you have questions, please do not hesitate to call me. I look forward to following Cheyenne Leach along with you.    Sincerely,    Tomasz Dash Jr., MD    Enclosure  CC:  No Recipients    If you would like to receive this communication electronically, please contact [email or web address] or [phone] to request more information on [facility] access.    For providers and/or their staff who would like to refer a patient to [facility], please contact us through our one-stop-shop provider referral line, [facility], at [phone].    If you feel you have received this communication in error or would no longer like to receive these types of communications, please e-mail [email or web address]

## 2018-08-10 NOTE — PROGRESS NOTES
Subjective:     Patient ID: Cheyenne Leach is a 85 y.o. female    Chief Complaint: Back Pain      Referred by: Scott Russell MD      HPI:    Initial Encounter (8/10/18):  Cheyenne Leach is a 85 y.o. female who presents today with bilateral hip pain that radiates down both legs . This pain has been present for years. She is s/p lumbar laminectomy and fusion a few years ago. This improved her pain at the time, but her pain has gradually been worsening. The pain is mostly in her lower extremities. It is worsened with walking and standing for only short durations. It is improved with rest. Patient has been managed chronically with opioid pain medication. She also uses voltaren gel. These do well to control her pain. She has had previous interventional injections done by Dr. Shearer. These did not work. Patient states that they caused her to gain weight and develop hypertension. She is not interested in further interventional procedures.   This pain is described in detail below.    Physical Therapy: none        Non-pharmacologic Treatment: heat,ice.         · TENS? No    Pain Medications:         · Currently taking: Norco 10-325mg q6h PRN, Flexeril, voltaren gel, Fentanyl 12.5mcg patch q72 hours.    · Has tried in the past:  Topical rubs    · Has not tried: NSAIDs, TCAs, SNRIs, anticonvulsants    Blood thinners: none    Interventional Therapies: Previous ESIs done by Dr. Shearer    Relevant Surgeries: Previous L3-5 decompression/fusion    Affecting sleep? Yes    Affecting daily activities? yes    Depressive symptoms? yes          · SI/HI? No    Work status: Retired    Pain Scores:    Best:       10/10  Worst:     10/10  Usually:   10/10  Today:    10/10    Review of Systems   Constitutional: Negative for activity change, appetite change, chills, fatigue, fever and unexpected weight change.   HENT: Negative for hearing loss.    Eyes: Negative for visual disturbance.   Respiratory: Negative for chest tightness and  shortness of breath.    Cardiovascular: Negative for chest pain.   Gastrointestinal: Negative for abdominal pain, constipation, diarrhea, nausea and vomiting.   Genitourinary: Negative for difficulty urinating.   Musculoskeletal: Positive for arthralgias, back pain, gait problem and myalgias. Negative for neck pain.   Skin: Negative for rash.   Neurological: Positive for weakness and numbness. Negative for dizziness, light-headedness and headaches.   Psychiatric/Behavioral: Positive for sleep disturbance. Negative for hallucinations and suicidal ideas. The patient is not nervous/anxious.        Past Medical History:   Diagnosis Date    Arthritis     Bowel obstruction 4/6/214    [facility]     Diverticulitis     Fibromyalgia     Hyperlipidemia     Hypertension     Pinched nerve     x 2    Sciatica        Past Surgical History:   Procedure Laterality Date    APPENDECTOMY      BACK SURGERY      lumbar     BREAST SURGERY      lump removed    HYSTERECTOMY      intestinal obstruction  11/2012    intestinal obstruction  4/6/2014    SMALL INTESTINE SURGERY      vocal cord injections         Social History     Social History    Marital status:      Spouse name: N/A    Number of children: N/A    Years of education: N/A     Occupational History    Not on file.     Social History Main Topics    Smoking status: Never Smoker    Smokeless tobacco: Never Used    Alcohol use No    Drug use: No    Sexual activity: No     Other Topics Concern    Not on file     Social History Narrative    No narrative on file       Review of patient's allergies indicates:   Allergen Reactions    Cymbalta [duloxetine] Nausea And Vomiting    Lyrica  [pregabalin]      Other reaction(s): Vomiting    Ropinirole Nausea And Vomiting    Statins-hmg-coa reductase inhibitors      Other reaction(s): Muscle pain    Sulfa (sulfonamide antibiotics) Rash       Current Outpatient Prescriptions on File Prior to Visit    Medication Sig Dispense Refill    amitriptyline (ELAVIL) 25 MG tablet Take 1 tablet (25 mg total) by mouth every evening. 30 tablet 5    bumetanide (BUMEX) 1 MG tablet TAKE FOR SEVERE SWELLING OF HER LEGS 90 tablet 3    cloNIDine (CATAPRES) 0.1 MG tablet Take 0.5 tablets (0.05 mg total) by mouth 2 (two) times daily. 180 tablet 3    cyclobenzaprine (FLEXERIL) 10 MG tablet Take 1 tablet (10 mg total) by mouth every evening. for 10 days 30 tablet 5    diclofenac sodium 1 % Gel APPLY TOPICALLY 4 TIMES A DAY AS NEEDED 5 Tube 3    diltiaZEM (CARDIZEM) 60 MG tablet Take one  Daily at supper 90 tablet 3    ergocalciferol (VITAMIN D2) 50,000 unit Cap Take 1 capsule (50,000 Units total) by mouth every 7 days. 24 capsule 1    fentaNYL (DURAGESIC) 12 mcg/hr PT72 Place 1 patch onto the skin every 72 hours. 10 patch 0    gabapentin (NEURONTIN) 300 MG capsule TAKE 2 CAPSULE BY MOUTH BREAKFAST AND LUNCH AND 3 AT BEDTIME 210 capsule 3    HYDROcodone-acetaminophen (NORCO)  mg per tablet Take 1 tablet by mouth every 6 (six) hours as needed. 120 tablet 0    [START ON 9/7/2018] HYDROcodone-acetaminophen (NORCO)  mg per tablet Take 1 tablet by mouth every 6 (six) hours as needed. 120 tablet 0    [START ON 10/7/2018] HYDROcodone-acetaminophen (NORCO)  mg per tablet Take 1 tablet by mouth every 6 (six) hours as needed. 120 tablet 0    levothyroxine (SYNTHROID) 75 MCG tablet TAKE 1 TABLET (75 MCG TOTAL) BY MOUTH ONCE DAILY. 90 tablet 3    losartan (COZAAR) 100 MG tablet Take one in the AM for HTN 90 tablet 3    memantine (NAMENDA) 5 MG Tab Take one nightly for 6 weeks, then one BID (Patient taking differently: Take 5 mg by mouth 2 (two) times daily. ) 180 tablet 3    omeprazole (PRILOSEC) 20 MG capsule Take 1 capsule (20 mg total) by mouth once daily. 1 Capsule(s) Oral PRN Every day. (Patient taking differently: Take 20 mg by mouth once daily. ) 30 capsule 11    ondansetron (ZOFRAN) 4 MG tablet TAKE 1  "TABLET (4 MG TOTAL) BY MOUTH EVERY 6 (SIX) HOURS AS NEEDED. 10 tablet 1     No current facility-administered medications on file prior to visit.        Objective:      BP (!) 148/84   Pulse 72   Resp 16   Ht 5' 3" (1.6 m)   Wt 66.2 kg (145 lb 13.4 oz)   BMI 25.83 kg/m²     Exam:  GEN:  Well developed, well nourished.  No acute distress.   HEENT:  No trauma.  Mucous membranes moist.  Nares patent bilaterally.  PSYCH: Normal affect. Thought content appropriate.  CHEST:  Breathing symmetric.  No audible wheezing.  ABD: Soft, non-distended.  SKIN:  Warm, pink, dry.  No rash on exposed areas.    EXT:  No cyanosis, clubbing, or edema.  No color change or changes in nail or hair growth.  NEURO/MUSCULOSKELETAL:  Fully alert, oriented, and appropriate. Speech normal brian. No cranial nerve deficits.   Gait: Antalgic.  No focal motor deficits.       Imaging:  Narrative     EXAMINATION:  XR LUMBAR SPINE AP AND LATERAL    CLINICAL HISTORY:  Low back pain, >6wks conservative tx, persistent-progressive sx, surgical candidate;Postlaminectomy syndrome, not elsewhere classified    TECHNIQUE:  AP, lateral and spot images were performed of the lumbar spine.    COMPARISON:  08/30/2017    FINDINGS:  Extensive postoperative changes of the lumbar spine are noted with posterior decompression and fusion at the L3 through L5 level.  There is a large lateral staple at the L2-3 level.  There is anterolisthesis of L5 in respect to S1 by approximately 1 cm.  This is unchanged from previous CT scan of 2017.  Vertebral body heights are maintained.  No fracture is identified.   Impression       As above.      Electronically signed by: Swati Watson MD  Date: 07/17/2018  Time: 10:33         Assessment:       Encounter Diagnoses   Name Primary?    DDD (degenerative disc disease), lumbar Yes    Lumbar spondylosis     Postlaminectomy syndrome of lumbar region     Spinal stenosis of lumbar region with neurogenic claudication     Chronic " pain disorder          Plan:       Cheyenne was seen today for back pain.    Diagnoses and all orders for this visit:    DDD (degenerative disc disease), lumbar    Lumbar spondylosis    Postlaminectomy syndrome of lumbar region    Spinal stenosis of lumbar region with neurogenic claudication    Chronic pain disorder        Cheyenne Leach is a 85 y.o. female with chronic lower extremity pain. Subjectively sounds like neurogenic claudication. Patient denies having significant low back pain. Managed with chronic opioids.    1. Pertinent imaging studies reviewed by me. Imaging results were discussed with patient.  2. LA  reviewed and no inconsistencies were noted.  3. Ok to continue current opioid pain medications (Norco 10-325mg q6h PRN and Fentanyl 12.5mcg patch q72 hours). Recommend routine UDS and prescription monitoring to ensure no misuse or diversion.  While this is not an ideal treatment, patient has failed previous interventional procedures and has history of major lumbar surgery. Her age is also a factor.   4. No interventional procedures at this time as patient is not interested. May benefit from caudal LUKAS vs. SCS trial, but patient is not interested at this time.  5. RTC PRN.

## 2018-09-21 ENCOUNTER — TELEPHONE (OUTPATIENT)
Dept: INTERNAL MEDICINE | Facility: CLINIC | Age: 83
End: 2018-09-21

## 2018-09-21 RX ORDER — HYDROCODONE BITARTRATE AND ACETAMINOPHEN 10; 325 MG/1; MG/1
1 TABLET ORAL EVERY 6 HOURS PRN
Qty: 120 TABLET | Refills: 0 | Status: SHIPPED | OUTPATIENT
Start: 2018-09-21 | End: 2018-11-06 | Stop reason: SDUPTHER

## 2018-09-21 NOTE — TELEPHONE ENCOUNTER
----- Message from George Rao sent at 2018  8:44 AM CDT -----  Contact: Patient  Cheyenne Leach  MRN: [id number]  : 7/3/1933  PCP: Tomasz Lynch  Home Phone      [phone]  Work Phone      Not on file.  Mobile          Not on file.      MESSAGE: needs appt in order to get refills -- can not wait until he returns -- please advise    Call [phone]    PCP: Syed  
OK for Rx  
Pt is requesting refill on Norco  WM  
No

## 2018-10-01 ENCOUNTER — CLINICAL SUPPORT (OUTPATIENT)
Dept: FAMILY MEDICINE | Facility: CLINIC | Age: 83
End: 2018-10-01
Payer: MEDICARE

## 2018-10-01 DIAGNOSIS — G89.4 CHRONIC PAIN SYNDROME: ICD-10-CM

## 2018-10-01 DIAGNOSIS — M79.7 FIBROMYALGIA: ICD-10-CM

## 2018-10-01 LAB
ALBUMIN SERPL BCP-MCNC: 4.4 G/DL
ALP SERPL-CCNC: 79 U/L
ALT SERPL W/O P-5'-P-CCNC: 10 U/L
ANION GAP SERPL CALC-SCNC: 12 MMOL/L
AST SERPL-CCNC: 16 U/L
BASOPHILS # BLD AUTO: 0.01 K/UL
BASOPHILS NFR BLD: 0.2 %
BILIRUB SERPL-MCNC: 0.8 MG/DL
BUN SERPL-MCNC: 14 MG/DL
CALCIUM SERPL-MCNC: 9.6 MG/DL
CHLORIDE SERPL-SCNC: 99 MMOL/L
CO2 SERPL-SCNC: 28 MMOL/L
CREAT SERPL-MCNC: 1.1 MG/DL
DIFFERENTIAL METHOD: NORMAL
EOSINOPHIL # BLD AUTO: 0 K/UL
EOSINOPHIL NFR BLD: 0.2 %
ERYTHROCYTE [DISTWIDTH] IN BLOOD BY AUTOMATED COUNT: 14 %
ERYTHROCYTE [SEDIMENTATION RATE] IN BLOOD BY WESTERGREN METHOD: 5 MM/HR
EST. GFR  (AFRICAN AMERICAN): 53 ML/MIN/1.73 M^2
EST. GFR  (NON AFRICAN AMERICAN): 46 ML/MIN/1.73 M^2
GLUCOSE SERPL-MCNC: 121 MG/DL
HCT VFR BLD AUTO: 38.5 %
HGB BLD-MCNC: 12.4 G/DL
LYMPHOCYTES # BLD AUTO: 2.1 K/UL
LYMPHOCYTES NFR BLD: 43.1 %
MCH RBC QN AUTO: 30.9 PG
MCHC RBC AUTO-ENTMCNC: 32.2 G/DL
MCV RBC AUTO: 96 FL
MONOCYTES # BLD AUTO: 0.5 K/UL
MONOCYTES NFR BLD: 11 %
NEUTROPHILS # BLD AUTO: 2.2 K/UL
NEUTROPHILS NFR BLD: 45.5 %
PLATELET # BLD AUTO: 170 K/UL
PMV BLD AUTO: 10.7 FL
POTASSIUM SERPL-SCNC: 3.7 MMOL/L
PROT SERPL-MCNC: 7 G/DL
RBC # BLD AUTO: 4.01 M/UL
SODIUM SERPL-SCNC: 139 MMOL/L
WBC # BLD AUTO: 4.92 K/UL

## 2018-10-01 PROCEDURE — 85651 RBC SED RATE NONAUTOMATED: CPT

## 2018-10-01 PROCEDURE — 99999 PR STA SHADOW: CPT | Mod: PBBFAC,,, | Performed by: FAMILY MEDICINE

## 2018-10-01 PROCEDURE — 80053 COMPREHEN METABOLIC PANEL: CPT

## 2018-10-01 PROCEDURE — 85025 COMPLETE CBC W/AUTO DIFF WBC: CPT

## 2018-10-01 PROCEDURE — 36415 COLL VENOUS BLD VENIPUNCTURE: CPT | Mod: PBBFAC

## 2018-11-05 RX ORDER — AMITRIPTYLINE HYDROCHLORIDE 25 MG/1
TABLET, FILM COATED ORAL
Qty: 30 TABLET | Refills: 5 | Status: SHIPPED | OUTPATIENT
Start: 2018-11-05 | End: 2018-12-24 | Stop reason: SDUPTHER

## 2018-11-06 ENCOUNTER — OFFICE VISIT (OUTPATIENT)
Dept: FAMILY MEDICINE | Facility: CLINIC | Age: 83
End: 2018-11-06
Payer: MEDICARE

## 2018-11-06 ENCOUNTER — TELEPHONE (OUTPATIENT)
Dept: FAMILY MEDICINE | Facility: CLINIC | Age: 83
End: 2018-11-06

## 2018-11-06 VITALS
HEART RATE: 76 BPM | WEIGHT: 138.25 LBS | HEIGHT: 63 IN | SYSTOLIC BLOOD PRESSURE: 134 MMHG | BODY MASS INDEX: 24.5 KG/M2 | RESPIRATION RATE: 16 BRPM | DIASTOLIC BLOOD PRESSURE: 98 MMHG

## 2018-11-06 DIAGNOSIS — M79.604 BILATERAL LEG PAIN: ICD-10-CM

## 2018-11-06 DIAGNOSIS — G89.4 CHRONIC PAIN SYNDROME: ICD-10-CM

## 2018-11-06 DIAGNOSIS — I10 ESSENTIAL HYPERTENSION: Primary | ICD-10-CM

## 2018-11-06 DIAGNOSIS — M54.50 LUMBAR PAIN: ICD-10-CM

## 2018-11-06 DIAGNOSIS — M79.605 BILATERAL LEG PAIN: ICD-10-CM

## 2018-11-06 DIAGNOSIS — M79.7 FIBROMYALGIA: ICD-10-CM

## 2018-11-06 PROCEDURE — 90662 IIV NO PRSV INCREASED AG IM: CPT | Mod: PBBFAC

## 2018-11-06 PROCEDURE — 99999 PR PBB SHADOW E&M-EST. PATIENT-LVL III: CPT | Mod: PBBFAC,,, | Performed by: FAMILY MEDICINE

## 2018-11-06 PROCEDURE — 99213 OFFICE O/P EST LOW 20 MIN: CPT | Mod: PBBFAC | Performed by: FAMILY MEDICINE

## 2018-11-06 PROCEDURE — 99999 FLU VACCINE - HIGH DOSE (65+) PRESERVATIVE FREE IM: CPT | Mod: PBBFAC,,,

## 2018-11-06 PROCEDURE — 99213 OFFICE O/P EST LOW 20 MIN: CPT | Mod: S$PBB | Performed by: FAMILY MEDICINE

## 2018-11-06 PROCEDURE — 99999 PR STA SHADOW: CPT | Mod: PBBFAC,,, | Performed by: FAMILY MEDICINE

## 2018-11-06 RX ORDER — BUMETANIDE 1 MG/1
TABLET ORAL
Qty: 90 TABLET | Refills: 3 | Status: SHIPPED | OUTPATIENT
Start: 2018-11-06 | End: 2019-04-01 | Stop reason: SDUPTHER

## 2018-11-06 RX ORDER — HYDROCODONE BITARTRATE AND ACETAMINOPHEN 10; 325 MG/1; MG/1
1 TABLET ORAL EVERY 6 HOURS PRN
Qty: 120 TABLET | Refills: 0 | Status: SHIPPED | OUTPATIENT
Start: 2018-12-06 | End: 2018-12-24 | Stop reason: SDUPTHER

## 2018-11-06 RX ORDER — DILTIAZEM HYDROCHLORIDE 240 MG/1
CAPSULE, COATED, EXTENDED RELEASE ORAL
Qty: 90 CAPSULE | Refills: 3 | Status: SHIPPED | OUTPATIENT
Start: 2018-11-06 | End: 2019-07-19 | Stop reason: SDUPTHER

## 2018-11-06 RX ORDER — HYDROCODONE BITARTRATE AND ACETAMINOPHEN 10; 325 MG/1; MG/1
1 TABLET ORAL EVERY 6 HOURS PRN
Qty: 120 TABLET | Refills: 0 | Status: SHIPPED | OUTPATIENT
Start: 2019-01-06 | End: 2019-01-14 | Stop reason: SDUPTHER

## 2018-11-06 RX ORDER — DICLOFENAC SODIUM 30 MG/G
GEL TOPICAL
Qty: 500 G | Refills: 5 | Status: SHIPPED | OUTPATIENT
Start: 2018-11-06 | End: 2019-03-14

## 2018-11-06 RX ORDER — LOSARTAN POTASSIUM 100 MG/1
TABLET ORAL
Qty: 90 TABLET | Refills: 3 | Status: SHIPPED | OUTPATIENT
Start: 2018-11-06 | End: 2019-01-02

## 2018-11-06 RX ORDER — HYDROCODONE BITARTRATE AND ACETAMINOPHEN 10; 325 MG/1; MG/1
1 TABLET ORAL EVERY 6 HOURS PRN
Qty: 120 TABLET | Refills: 0 | Status: SHIPPED | OUTPATIENT
Start: 2018-11-06 | End: 2018-12-24 | Stop reason: SDUPTHER

## 2018-11-06 RX ORDER — DICLOFENAC SODIUM 10 MG/G
GEL TOPICAL
Qty: 5 TUBE | Refills: 3 | Status: SHIPPED | OUTPATIENT
Start: 2018-11-06 | End: 2019-03-14

## 2018-11-06 NOTE — TELEPHONE ENCOUNTER
Received need for clarification they need to know how many grams are needed for Diclofenac Gel please re-send specifying gram amount to [facility] thank you.

## 2018-11-06 NOTE — PROGRESS NOTES
Subjective:       Patient ID: Cheyenne Leach is a 85 y.o. female.    Chief Complaint: Follow-up (3m check up)    Pt is a 85 y.o. female who presents for check up for Bilateral leg pain  Chronic pain syndrome  Fibromyalgia. Doing well on current meds. Denies any side effects. Prevention is up to date.      Review of Systems   Constitutional: Negative for appetite change, chills and fever.   HENT: Negative for rhinorrhea, sinus pressure, sore throat and trouble swallowing.    Respiratory: Negative for cough, chest tightness, shortness of breath and wheezing.    Cardiovascular: Negative for chest pain and palpitations.   Gastrointestinal: Negative for abdominal pain, blood in stool, diarrhea, nausea and vomiting.   Genitourinary: Negative for dysuria, flank pain, hematuria, pelvic pain, urgency, vaginal bleeding, vaginal discharge and vaginal pain.   Musculoskeletal: Positive for back pain and gait problem. Negative for joint swelling and neck stiffness.        Trouble walking   Skin: Negative for rash.   Neurological: Negative for dizziness, weakness, light-headedness, numbness and headaches.   Hematological: Does not bruise/bleed easily.   Psychiatric/Behavioral: Negative for agitation. The patient is not nervous/anxious.        Objective:      Physical Exam   Constitutional: She is oriented to person, place, and time. She appears well-developed and well-nourished.   HENT:   Head: Normocephalic.   Eyes: Pupils are equal, round, and reactive to light.   Neck: Normal range of motion. Neck supple. No thyromegaly present.   Cardiovascular: Normal rate and regular rhythm.   Pulmonary/Chest: No respiratory distress. She has no wheezes. She has no rales. She exhibits no tenderness.   Abdominal: She exhibits no distension. There is no tenderness. There is no rebound and no guarding.   Musculoskeletal: Normal range of motion. She exhibits no edema or tenderness.   Lymphadenopathy:     She has no cervical adenopathy.    Neurological: She is alert and oriented to person, place, and time. She has normal reflexes. She displays normal reflexes. No cranial nerve deficit. She exhibits normal muscle tone. Coordination normal.   Skin: Skin is warm and dry. No rash noted. No pallor.   Psychiatric: She has a normal mood and affect. Judgment and thought content normal.       Assessment:       1. Essential hypertension    2. Bilateral leg pain    3. Chronic pain syndrome    4. Fibromyalgia    5. Lumbar pain        Plan:   Cheyenne was seen today for follow-up.    Diagnoses and all orders for this visit:    Essential hypertension    Bilateral leg pain  -     diclofenac sodium (VOLTAREN) 1 % Gel; APPLY TOPICALLY 4 TIMES A DAY AS NEEDED  -     bumetanide (BUMEX) 1 MG tablet; TAKE in the AM FOR SEVERE SWELLING OF HER LEGS    Chronic pain syndrome  -     diclofenac sodium (VOLTAREN) 1 % Gel; APPLY TOPICALLY 4 TIMES A DAY AS NEEDED    Fibromyalgia  -     diclofenac sodium (VOLTAREN) 1 % Gel; APPLY TOPICALLY 4 TIMES A DAY AS NEEDED    Lumbar pain    Other orders  -     HYDROcodone-acetaminophen (NORCO)  mg per tablet; Take 1 tablet by mouth every 6 (six) hours as needed.  -     HYDROcodone-acetaminophen (NORCO)  mg per tablet; Take 1 tablet by mouth every 6 (six) hours as needed.  -     HYDROcodone-acetaminophen (NORCO)  mg per tablet; Take 1 tablet by mouth every 6 (six) hours as needed.  -     diltiaZEM (CARDIZEM CD) 240 MG 24 hr capsule; One po nightly for HTN  -     losartan (COZAAR) 100 MG tablet; Take one in the AM for HTN

## 2018-11-12 ENCOUNTER — TELEPHONE (OUTPATIENT)
Dept: FAMILY MEDICINE | Facility: CLINIC | Age: 83
End: 2018-11-12

## 2018-11-12 NOTE — TELEPHONE ENCOUNTER
----- Message from George Rao sent at 2018  8:50 AM CST -----  Contact: Patient  Cheyenne Leach  MRN: [id number]  : 7/3/1933  PCP: Tomasz Lynch  Home Phone      [phone]  Work Phone      Not on file.  Mobile          Not on file.      MESSAGE: trying to get back brace -- needs Dx code for [facility]     Call [phone]    PCP: Seyd

## 2018-11-12 NOTE — TELEPHONE ENCOUNTER
----- Message from Anna Heredia sent at 2018  4:49 PM CST -----  Contact: Self  Cheyenne Leach  MRN: [id number]  : 7/3/1933  PCP: Tomasz Lynch  Home Phone      [phone]  Work Phone      Not on file.  Mobile          Not on file.      MESSAGE:   Patient would like to speak with someone regarding back pain, was told to go to [facility] but was not giving anything to bring with her, she is a little confused on what to tell them, Please advise    Phone:  [phone]

## 2018-11-12 NOTE — TELEPHONE ENCOUNTER
Spoke with pt--had questions about the back brace. And also wants to speak with you after you finish clinic today.

## 2018-11-14 NOTE — TELEPHONE ENCOUNTER
Spoke with Ava at [facility]--paperwork on MM's desk for signature. When signed along with attached written Rx fax back to Ava. She will notify pt.

## 2018-11-14 NOTE — TELEPHONE ENCOUNTER
----- Message from Anna Heredia sent at 2018  9:45 AM CST -----  Contact: Matias Leach  MRN: [id number]  : 7/3/1933  PCP: Tomasz Lynch  Home Phone      [phone]  Work Phone      Not on file.  Mobile          Not on file.      MESSAGE:   Patient would like to have paper from [facility] to be filled out and faxed over to them so she can get back brace, states that she is in a lot of pain.  [facility] faxed papers for her yesterday.  Phone:  [phone]

## 2018-11-15 ENCOUNTER — TELEPHONE (OUTPATIENT)
Dept: FAMILY MEDICINE | Facility: CLINIC | Age: 83
End: 2018-11-15

## 2018-11-15 NOTE — TELEPHONE ENCOUNTER
----- Message from Luna Cheema sent at 11/15/2018  9:56 AM CST -----  Contact: sELF  Cheyenne Leach  MRN: [id number]  : 7/3/1933  PCP: Tomasz Lynch  Home Phone      [phone]  Work Phone      Not on file.  Mobile          Not on file.      MESSAGE:   Patient is calling to check the status of paperwork. She is in a lot of pain and has  Been waiting since Saturday. Please fax and call patient when complete.    Cheyenne  [phone]

## 2018-11-16 ENCOUNTER — TELEPHONE (OUTPATIENT)
Dept: FAMILY MEDICINE | Facility: CLINIC | Age: 83
End: 2018-11-16

## 2018-11-16 NOTE — TELEPHONE ENCOUNTER
----- Message from George Rao sent at 2018  4:39 PM CST -----  Contact: Patient  Cheyenne Leach  MRN: [id number]  : 7/3/1933  PCP: Tomasz Lynch  Home Phone      [phone]  Work Phone      Not on file.  Mobile          Not on file.      MESSAGE: requesting to speak with Dr Lynch Re: her back pain -- states she did finally receive her back brace    Call [phone]    PCP: Syed

## 2018-12-24 ENCOUNTER — OFFICE VISIT (OUTPATIENT)
Dept: FAMILY MEDICINE | Facility: CLINIC | Age: 83
End: 2018-12-24
Payer: MEDICARE

## 2018-12-24 VITALS
RESPIRATION RATE: 18 BRPM | WEIGHT: 150.38 LBS | HEART RATE: 72 BPM | SYSTOLIC BLOOD PRESSURE: 142 MMHG | BODY MASS INDEX: 26.64 KG/M2 | DIASTOLIC BLOOD PRESSURE: 84 MMHG | HEIGHT: 63 IN

## 2018-12-24 DIAGNOSIS — M21.611 BUNION OF GREAT TOE OF RIGHT FOOT: ICD-10-CM

## 2018-12-24 DIAGNOSIS — R60.9 EDEMA, UNSPECIFIED TYPE: Primary | ICD-10-CM

## 2018-12-24 DIAGNOSIS — I10 ESSENTIAL HYPERTENSION: ICD-10-CM

## 2018-12-24 DIAGNOSIS — M21.612 BUNION OF GREAT TOE OF LEFT FOOT: ICD-10-CM

## 2018-12-24 PROCEDURE — 99214 OFFICE O/P EST MOD 30 MIN: CPT | Mod: S$PBB | Performed by: FAMILY MEDICINE

## 2018-12-24 PROCEDURE — 99213 OFFICE O/P EST LOW 20 MIN: CPT | Mod: PBBFAC,25 | Performed by: FAMILY MEDICINE

## 2018-12-24 PROCEDURE — 96372 THER/PROPH/DIAG INJ SC/IM: CPT | Mod: PBBFAC

## 2018-12-24 PROCEDURE — 99999 PR STA SHADOW: CPT | Mod: PBBFAC,,, | Performed by: FAMILY MEDICINE

## 2018-12-24 PROCEDURE — 99999 PR STA SHADOW: CPT | Mod: PBBFAC,,,

## 2018-12-24 PROCEDURE — 99999 PR PBB SHADOW E&M-EST. PATIENT-LVL III: CPT | Mod: PBBFAC,,, | Performed by: FAMILY MEDICINE

## 2018-12-24 RX ORDER — FUROSEMIDE 10 MG/ML
20 INJECTION INTRAMUSCULAR; INTRAVENOUS
Status: COMPLETED | OUTPATIENT
Start: 2018-12-24 | End: 2018-12-24

## 2018-12-24 RX ORDER — FUROSEMIDE 10 MG/ML
20 INJECTION INTRAMUSCULAR; INTRAVENOUS
Status: DISCONTINUED | OUTPATIENT
Start: 2018-12-24 | End: 2018-12-24

## 2018-12-24 RX ORDER — DICLOFENAC SODIUM 50 MG/1
50 TABLET, DELAYED RELEASE ORAL 2 TIMES DAILY
Qty: 60 TABLET | Refills: 0 | Status: SHIPPED | OUTPATIENT
Start: 2018-12-24 | End: 2019-03-14

## 2018-12-24 RX ORDER — HYDROCHLOROTHIAZIDE 12.5 MG/1
12.5 TABLET ORAL DAILY
Qty: 30 TABLET | Refills: 0 | Status: SHIPPED | OUTPATIENT
Start: 2018-12-24 | End: 2019-01-02 | Stop reason: SDUPTHER

## 2018-12-24 RX ADMIN — FUROSEMIDE 20 MG: 10 INJECTION, SOLUTION INTRAMUSCULAR; INTRAVENOUS at 09:12

## 2018-12-24 NOTE — PROGRESS NOTES
Subjective:       Patient ID: Cheyenne Leach is a 85 y.o. female.    Chief Complaint: foot swelling/pain    Pt is a 85 y.o. female who presents for evaluation and management of   Encounter Diagnoses   Name Primary?    Edema, unspecified type Yes    Bunion of great toe of left foot     Bunion of great toe of right foot     Essential hypertension    .  Doing well on current meds. Denies any side effects. Prevention is up to date.    Review of Systems   Respiratory: Negative for shortness of breath.    Cardiovascular: Positive for leg swelling. Negative for chest pain.   Musculoskeletal: Positive for arthralgias.       Objective:      Physical Exam   Constitutional: She is oriented to person, place, and time. She appears well-developed and well-nourished.   HENT:   Head: Normocephalic and atraumatic.   Right Ear: External ear normal.   Left Ear: External ear normal.   Nose: Nose normal.   Mouth/Throat: Oropharynx is clear and moist.   Eyes: EOM are normal. Pupils are equal, round, and reactive to light.   Neck: Normal range of motion. Neck supple. No JVD present. No tracheal deviation present. No thyromegaly present.   Cardiovascular: Normal rate, normal heart sounds and intact distal pulses.   No murmur heard.  Pulmonary/Chest: Effort normal and breath sounds normal. No respiratory distress. She has no wheezes. She has no rales. She exhibits no tenderness.   Abdominal: Soft. Bowel sounds are normal. She exhibits no distension and no mass. There is no tenderness. There is no rebound and no guarding.   Musculoskeletal: Normal range of motion. She exhibits tenderness. She exhibits no edema.   2 plus pedal edema  1 plus pretib edema    james bunion    Lymphadenopathy:     She has no cervical adenopathy.   Neurological: She is alert and oriented to person, place, and time. She has normal reflexes. She displays normal reflexes. No cranial nerve deficit. She exhibits normal muscle tone. Coordination normal.   Skin: Skin is  warm and dry. No rash noted. No erythema. No pallor.   Psychiatric: She has a normal mood and affect. Her behavior is normal. Judgment and thought content normal.       Assessment:       1. Edema, unspecified type    2. Bunion of great toe of left foot    3. Bunion of great toe of right foot    4. Essential hypertension        Plan:   Cheyenne was seen today for foot swelling/pain.    Diagnoses and all orders for this visit:    Edema, unspecified type  -     hydroCHLOROthiazide (HYDRODIURIL) 12.5 MG Tab; Take 1 tablet (12.5 mg total) by mouth once daily.  -     furosemide injection 20 mg    Bunion of great toe of left foot  -     diclofenac (VOLTAREN) 50 MG EC tablet; Take 1 tablet (50 mg total) by mouth 2 (two) times daily.  -     Ambulatory referral to Podiatry    Bunion of great toe of right foot  -     diclofenac (VOLTAREN) 50 MG EC tablet; Take 1 tablet (50 mg total) by mouth 2 (two) times daily.  -     Ambulatory referral to Podiatry    Essential hypertension  -     hydroCHLOROthiazide (HYDRODIURIL) 12.5 MG Tab; Take 1 tablet (12.5 mg total) by mouth once daily.      Problem List Items Addressed This Visit     Essential hypertension    Relevant Medications    hydroCHLOROthiazide (HYDRODIURIL) 12.5 MG Tab      Other Visit Diagnoses     Edema, unspecified type    -  Primary    Relevant Medications    hydroCHLOROthiazide (HYDRODIURIL) 12.5 MG Tab    furosemide injection 20 mg (Start on 12/24/2018  9:15 AM)    Bunion of great toe of left foot        Relevant Medications    diclofenac (VOLTAREN) 50 MG EC tablet    Other Relevant Orders    Ambulatory referral to Podiatry    Bunion of great toe of right foot        Relevant Medications    diclofenac (VOLTAREN) 50 MG EC tablet    Other Relevant Orders    Ambulatory referral to Podiatry        Adding HCT for edema and HTN   Lasix IM today   NSAIDs for bunion. Will also refer to podiatry   RTC 1 week       No Follow-up on file.

## 2019-01-02 ENCOUNTER — OFFICE VISIT (OUTPATIENT)
Dept: FAMILY MEDICINE | Facility: CLINIC | Age: 84
End: 2019-01-02
Payer: MEDICARE

## 2019-01-02 VITALS
RESPIRATION RATE: 18 BRPM | HEART RATE: 68 BPM | BODY MASS INDEX: 28.82 KG/M2 | WEIGHT: 152.63 LBS | HEIGHT: 61 IN | SYSTOLIC BLOOD PRESSURE: 152 MMHG | DIASTOLIC BLOOD PRESSURE: 80 MMHG

## 2019-01-02 DIAGNOSIS — I10 ESSENTIAL HYPERTENSION: Primary | ICD-10-CM

## 2019-01-02 DIAGNOSIS — R60.9 EDEMA, UNSPECIFIED TYPE: ICD-10-CM

## 2019-01-02 LAB
ANION GAP SERPL CALC-SCNC: 11 MMOL/L
BUN SERPL-MCNC: 23 MG/DL
CALCIUM SERPL-MCNC: 9.6 MG/DL
CHLORIDE SERPL-SCNC: 99 MMOL/L
CO2 SERPL-SCNC: 30 MMOL/L
CREAT SERPL-MCNC: 0.9 MG/DL
EST. GFR  (AFRICAN AMERICAN): >60 ML/MIN/1.73 M^2
EST. GFR  (NON AFRICAN AMERICAN): 58 ML/MIN/1.73 M^2
GLUCOSE SERPL-MCNC: 105 MG/DL
POTASSIUM SERPL-SCNC: 3.7 MMOL/L
SODIUM SERPL-SCNC: 140 MMOL/L

## 2019-01-02 PROCEDURE — 36415 COLL VENOUS BLD VENIPUNCTURE: CPT | Mod: PBBFAC

## 2019-01-02 PROCEDURE — 99999 PR PBB SHADOW E&M-EST. PATIENT-LVL III: CPT | Mod: PBBFAC,,, | Performed by: FAMILY MEDICINE

## 2019-01-02 PROCEDURE — 99999 PR STA SHADOW: CPT | Mod: PBBFAC,,, | Performed by: FAMILY MEDICINE

## 2019-01-02 PROCEDURE — 99999 PR PBB SHADOW E&M-EST. PATIENT-LVL III: ICD-10-PCS | Mod: PBBFAC,,, | Performed by: FAMILY MEDICINE

## 2019-01-02 PROCEDURE — 99213 OFFICE O/P EST LOW 20 MIN: CPT | Mod: S$PBB | Performed by: FAMILY MEDICINE

## 2019-01-02 PROCEDURE — 99213 OFFICE O/P EST LOW 20 MIN: CPT | Mod: PBBFAC | Performed by: FAMILY MEDICINE

## 2019-01-02 PROCEDURE — 80048 BASIC METABOLIC PNL TOTAL CA: CPT

## 2019-01-02 RX ORDER — HYDROCHLOROTHIAZIDE 12.5 MG/1
12.5 TABLET ORAL DAILY
Qty: 30 TABLET | Refills: 1 | Status: SHIPPED | OUTPATIENT
Start: 2019-01-02 | End: 2019-02-12

## 2019-01-02 RX ORDER — IRBESARTAN 300 MG/1
300 TABLET ORAL NIGHTLY
Qty: 30 TABLET | Refills: 0 | Status: SHIPPED | OUTPATIENT
Start: 2019-01-02 | End: 2019-02-12

## 2019-01-02 NOTE — PROGRESS NOTES
Subjective:       Patient ID: Cheyenne Leach is a 85 y.o. female.    Chief Complaint: Follow-up (1 wk ) and Headache    Pt is a 85 y.o. female who presents for evaluation and management of   Encounter Diagnosis   Name Primary?    Essential hypertension Yes   Added HCT last visit and BP not changed. Still elevated  She is still concerned about her LE swelling .    Review of Systems   Respiratory: Negative for shortness of breath.    Cardiovascular: Positive for leg swelling. Negative for chest pain.       Objective:      Physical Exam   Constitutional: She is oriented to person, place, and time. She appears well-developed and well-nourished.   HENT:   Head: Normocephalic and atraumatic.   Right Ear: External ear normal.   Left Ear: External ear normal.   Nose: Nose normal.   Mouth/Throat: Oropharynx is clear and moist.   Eyes: EOM are normal. Pupils are equal, round, and reactive to light.   Neck: Normal range of motion. Neck supple. No JVD present. No tracheal deviation present. No thyromegaly present.   Cardiovascular: Normal rate, normal heart sounds and intact distal pulses.   No murmur heard.  Pulmonary/Chest: Effort normal and breath sounds normal. No respiratory distress. She has no wheezes. She has no rales. She exhibits no tenderness.   Abdominal: Soft. Bowel sounds are normal. She exhibits no distension and no mass. There is no tenderness. There is no rebound and no guarding.   Musculoskeletal: Normal range of motion. She exhibits edema. She exhibits no tenderness.   2 plus pedal edema  1 plus pretib edema    james bunion    Lymphadenopathy:     She has no cervical adenopathy.   Neurological: She is alert and oriented to person, place, and time. She has normal reflexes. She displays normal reflexes. No cranial nerve deficit. She exhibits normal muscle tone. Coordination normal.   Skin: Skin is warm and dry. No rash noted. No erythema. No pallor.   Psychiatric: She has a normal mood and affect. Her behavior  is normal. Judgment and thought content normal.       Assessment:       1. Essential hypertension        Plan:   Cheyenne was seen today for follow-up and headache.    Diagnoses and all orders for this visit:    Essential hypertension  -     irbesartan (AVAPRO) 300 MG tablet; Take 1 tablet (300 mg total) by mouth every evening.  -     Basic metabolic panel; Future  -     hydroCHLOROthiazide (HYDRODIURIL) 12.5 MG Tab; Take 1 tablet (12.5 mg total) by mouth once daily.    Edema, unspecified type  -     hydroCHLOROthiazide (HYDRODIURIL) 12.5 MG Tab; Take 1 tablet (12.5 mg total) by mouth once daily.      Problem List Items Addressed This Visit     Essential hypertension - Primary    Relevant Medications    irbesartan (AVAPRO) 300 MG tablet    Other Relevant Orders    Basic metabolic panel        Low Na diet   LE compression stockings(she isn't too high on this)   Changing ARB to avapro. Stop losartan. Could combine Avapro with HCTZ in one pill if she is controlled next visit   RTC 2 weeks with Dr. Wheeler     No Follow-up on file.

## 2019-01-14 ENCOUNTER — OFFICE VISIT (OUTPATIENT)
Dept: FAMILY MEDICINE | Facility: CLINIC | Age: 84
End: 2019-01-14
Payer: MEDICARE

## 2019-01-14 VITALS
DIASTOLIC BLOOD PRESSURE: 70 MMHG | BODY MASS INDEX: 26.05 KG/M2 | RESPIRATION RATE: 16 BRPM | HEIGHT: 63 IN | HEART RATE: 80 BPM | SYSTOLIC BLOOD PRESSURE: 160 MMHG | WEIGHT: 147 LBS

## 2019-01-14 DIAGNOSIS — M54.50 LUMBAR PAIN: ICD-10-CM

## 2019-01-14 DIAGNOSIS — S34.21XD: ICD-10-CM

## 2019-01-14 DIAGNOSIS — G89.4 CHRONIC PAIN SYNDROME: ICD-10-CM

## 2019-01-14 DIAGNOSIS — M79.7 FIBROMYALGIA: ICD-10-CM

## 2019-01-14 DIAGNOSIS — I10 ESSENTIAL HYPERTENSION: Primary | ICD-10-CM

## 2019-01-14 PROCEDURE — 99999 PR PBB SHADOW E&M-EST. PATIENT-LVL III: ICD-10-PCS | Mod: PBBFAC,,, | Performed by: FAMILY MEDICINE

## 2019-01-14 PROCEDURE — 99999 PR PBB SHADOW E&M-EST. PATIENT-LVL III: CPT | Mod: PBBFAC,,, | Performed by: FAMILY MEDICINE

## 2019-01-14 PROCEDURE — 99999 PR STA SHADOW: CPT | Mod: PBBFAC,,, | Performed by: FAMILY MEDICINE

## 2019-01-14 PROCEDURE — 99213 OFFICE O/P EST LOW 20 MIN: CPT | Mod: PBBFAC | Performed by: FAMILY MEDICINE

## 2019-01-14 PROCEDURE — 99213 OFFICE O/P EST LOW 20 MIN: CPT | Mod: S$PBB | Performed by: FAMILY MEDICINE

## 2019-01-14 RX ORDER — HYDROCODONE BITARTRATE AND ACETAMINOPHEN 10; 325 MG/1; MG/1
1 TABLET ORAL EVERY 6 HOURS PRN
Qty: 120 TABLET | Refills: 0 | Status: SHIPPED | OUTPATIENT
Start: 2019-01-14 | End: 2019-02-07 | Stop reason: SDUPTHER

## 2019-01-14 RX ORDER — HYDROCODONE BITARTRATE AND ACETAMINOPHEN 10; 325 MG/1; MG/1
1 TABLET ORAL EVERY 6 HOURS PRN
Qty: 40 TABLET | Refills: 0 | Status: SHIPPED | OUTPATIENT
Start: 2019-01-14 | End: 2019-02-12

## 2019-01-14 NOTE — PROGRESS NOTES
Subjective:       Patient ID: Cheyenne Leach is a 85 y.o. female.    Chief Complaint: Annual Exam and Pain (Back and leg pain)    Pt is a 85 y.o. female who presents for check up for chronic back pain & extreme leg swelling. Doing well on current meds. Denies any side effects. Prevention is up to date.      Review of Systems   Constitutional: Negative for appetite change, chills and fever.   HENT: Negative for rhinorrhea, sinus pressure, sore throat and trouble swallowing.    Respiratory: Negative for cough, chest tightness, shortness of breath and wheezing.    Cardiovascular: Negative for chest pain and palpitations.   Gastrointestinal: Negative for abdominal pain, blood in stool, diarrhea, nausea and vomiting.   Genitourinary: Negative for dysuria, flank pain, hematuria, pelvic pain, urgency, vaginal bleeding, vaginal discharge and vaginal pain.   Musculoskeletal: Positive for joint swelling. Negative for back pain and neck stiffness.        Legs are very swollen   Skin: Negative for rash.   Neurological: Negative for dizziness, weakness, light-headedness, numbness and headaches.   Hematological: Does not bruise/bleed easily.   Psychiatric/Behavioral: Negative for agitation. The patient is not nervous/anxious.        Objective:      Physical Exam   Constitutional: She is oriented to person, place, and time. She appears well-developed and well-nourished.   HENT:   Head: Normocephalic.   Eyes: Pupils are equal, round, and reactive to light.   Neck: Normal range of motion. Neck supple. No thyromegaly present.   Cardiovascular: Normal rate and regular rhythm.   Pulmonary/Chest: No respiratory distress. She has no wheezes. She has no rales. She exhibits no tenderness.   Abdominal: She exhibits no distension. There is no tenderness. There is no rebound and no guarding.   Musculoskeletal: Normal range of motion. She exhibits edema. She exhibits no tenderness.   Lymphadenopathy:     She has no cervical adenopathy.    Neurological: She is alert and oriented to person, place, and time. She has normal reflexes. She displays normal reflexes. No cranial nerve deficit. She exhibits normal muscle tone. Coordination normal.   Skin: Skin is warm and dry. No rash noted. No pallor.   Psychiatric: She has a normal mood and affect. Judgment and thought content normal.       Assessment:       1. Essential hypertension    2. Lumbar pain    3. 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 Chronic pain syndrome    5. Fibromyalgia        Plan:   Cheyenne was seen today for annual exam and pain.    Diagnoses and all orders for this visit:    Essential hypertension    Lumbar pain    Injury to lumbar nerve root, subsequent encounter    Chronic pain syndrome    Fibromyalgia    Other orders  -     HYDROcodone-acetaminophen (NORCO)  mg per tablet; Take 1 tablet by mouth every 6 (six) hours as needed.  -     HYDROcodone-acetaminophen (NORCO)  mg per tablet; Take 1 tablet by mouth every 6 (six) hours as needed.

## 2019-01-15 ENCOUNTER — OFFICE VISIT (OUTPATIENT)
Dept: NEUROLOGY | Facility: CLINIC | Age: 84
End: 2019-01-15
Payer: MEDICARE

## 2019-01-15 VITALS
BODY MASS INDEX: 26.32 KG/M2 | HEIGHT: 63 IN | DIASTOLIC BLOOD PRESSURE: 74 MMHG | HEART RATE: 92 BPM | WEIGHT: 148.56 LBS | SYSTOLIC BLOOD PRESSURE: 128 MMHG | RESPIRATION RATE: 18 BRPM

## 2019-01-15 DIAGNOSIS — M96.1 FAILED BACK SYNDROME, LUMBAR: ICD-10-CM

## 2019-01-15 DIAGNOSIS — G31.84 MCI (MILD COGNITIVE IMPAIRMENT): Primary | ICD-10-CM

## 2019-01-15 DIAGNOSIS — M79.7 FIBROMYALGIA: ICD-10-CM

## 2019-01-15 DIAGNOSIS — E03.9 ACQUIRED HYPOTHYROIDISM: ICD-10-CM

## 2019-01-15 PROCEDURE — 99213 OFFICE O/P EST LOW 20 MIN: CPT | Mod: PBBFAC | Performed by: PSYCHIATRY & NEUROLOGY

## 2019-01-15 PROCEDURE — 99999 PR PBB SHADOW E&M-EST. PATIENT-LVL III: CPT | Mod: PBBFAC,,, | Performed by: PSYCHIATRY & NEUROLOGY

## 2019-01-15 PROCEDURE — 99214 OFFICE O/P EST MOD 30 MIN: CPT | Mod: S$PBB | Performed by: PSYCHIATRY & NEUROLOGY

## 2019-01-15 PROCEDURE — 99999 PR STA SHADOW: CPT | Mod: PBBFAC,,, | Performed by: PSYCHIATRY & NEUROLOGY

## 2019-01-15 PROCEDURE — 99999 PR PBB SHADOW E&M-EST. PATIENT-LVL III: ICD-10-PCS | Mod: PBBFAC,,, | Performed by: PSYCHIATRY & NEUROLOGY

## 2019-01-15 NOTE — PROGRESS NOTES
HPI:Cheyenne Leach is a 85 y.o. female s/p lumbar surgery now with episode of visual loss (bilateral) and leg weakness bilaterally in 5/11. TIA possibly and work up for this and other etiologies was largey normal. Mild memory dysfunction noted along with  hypothyroidism  --now memory improved with correction of hypothyroidism.       She did see pain management since the last visit who recommend she continue her opiates.   PCP is treating LE edema per her with diruretics  She has tapered off baclofen and lumbar pain is the same.   Her memory continues to be the same. She has some mild short term memory loss, she manage her own meals, house and medication.  No accidents or incidents with driving. She has to struggle due to pain getting in and out of her car      Review of Systems   Constitutional: Negative for fever.   HENT: Negative for nosebleeds.    Eyes: Negative for double vision.   Respiratory: Negative for hemoptysis.    Cardiovascular: Negative for palpitations.   Gastrointestinal: Negative for blood in stool.   Genitourinary: Negative for hematuria.   Musculoskeletal: Positive for back pain and myalgias.   Skin: Negative for rash.   Neurological: Negative for tremors and headaches.   Psychiatric/Behavioral: Positive for memory loss.        Memory loss is stable       Exam:   Gen Appearance, well developed/nourished in no apparent distress  CV: 2+ distal pulses with no edema or swelling except for trace dependent edema  Neuro:  MS: Awake, alert, Sustains attention. She is is fully oriented,Recent recall is good. /remote memory intact, Language is full to spontaneous speech/comprehension. Fund of Knowledge is full and she can tell me her recent events   She has good insight and judgement about her memory and living arrangements  CN:  PERRL, Extraoccular movements and visual fields are full. Normal facial sensation and strength, Tongue and Palate are midline and strong. Shoulder Shrug symmetric and  strong.  Motor: Normal bulk, tone, no abnormal movements. No protonator drift and 5/5 bilateral UE and LE strength and 1+ reflexes  Sensory: Romberg negative and intact to temp and vibration  Cerebellar: Finger-nose, Rapid alternating movements intact  Gait: Normal stance, no ataxia and mild forward bending posture noted-suggested she consistently use her walker today to help with this.     Labs: 2018: TSH normal  2017 CMP unremarkable  Imaging: 3/2014 MRI brain: 1. No MRI evidence of an acute intracranial abnormality.  2. Atrophy and small vessel ischemic changes of the periventricular white matter.    2018 Lumbar xray: Extensive postoperative changes of the lumbar spine are noted with posterior decompression and fusion at the L3 through L5 level.  There is a large lateral staple at the L2-3 level.  There is anterolisthesis of L5 in respect to S1 by approximately 1 cm.  This is unchanged from previous CT scan of 2017.  Vertebral body heights are maintained.  No fracture is identified.    Assessment/Recommendation:  Cheyenne Leach is a 85 y.o. female s/p lumbar surgery now with episode of visual loss (bilateral) and leg weakness bilaterally in 5/11. TIA possibly and work up for this and other etiologies was largey normal. Mild memory dysfunction noted along with  hypothyroidism  --now memory improved with correction of hypothyroidism. I recommend:   1.  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  She also has fibromyalgia and takes elavil, opiates,  and gabapentin for pain via PCP.   Consider car cane (grab bar) to help with getting in and out of car (she is limited due to pain)  3. Continue levothyroxine with PCP-- Dr Lynch - 2018 TSH normalized  4. At worse, she has very mild cognitive impairment, but is functioing very well currently and has been stable over the  years but symptoms continue to bother her. Continue Namenda for memory loss complaint which she states helps.   No restrictions given currently- will monitor. Continue physical exercise and mental exercises for MCI  5. She continues 81mg ASA and ARB for HTN /all for CVA prevention.    RTC 1 year

## 2019-02-07 ENCOUNTER — OFFICE VISIT (OUTPATIENT)
Dept: FAMILY MEDICINE | Facility: CLINIC | Age: 84
End: 2019-02-07
Payer: MEDICARE

## 2019-02-07 VITALS
HEIGHT: 63 IN | RESPIRATION RATE: 16 BRPM | HEART RATE: 92 BPM | WEIGHT: 146.63 LBS | SYSTOLIC BLOOD PRESSURE: 116 MMHG | DIASTOLIC BLOOD PRESSURE: 64 MMHG | BODY MASS INDEX: 25.98 KG/M2

## 2019-02-07 DIAGNOSIS — Z01.818 PRE-OPERATIVE CLEARANCE: Primary | ICD-10-CM

## 2019-02-07 DIAGNOSIS — H25.9 AGE-RELATED CATARACT OF BOTH EYES, UNSPECIFIED AGE-RELATED CATARACT TYPE: ICD-10-CM

## 2019-02-07 PROCEDURE — 99213 OFFICE O/P EST LOW 20 MIN: CPT | Mod: PBBFAC | Performed by: FAMILY MEDICINE

## 2019-02-07 PROCEDURE — 99999 PR STA SHADOW: ICD-10-PCS | Mod: PBBFAC,,, | Performed by: FAMILY MEDICINE

## 2019-02-07 PROCEDURE — 99999 PR STA SHADOW: CPT | Mod: PBBFAC,,, | Performed by: FAMILY MEDICINE

## 2019-02-07 PROCEDURE — 99213 OFFICE O/P EST LOW 20 MIN: CPT | Mod: S$PBB | Performed by: FAMILY MEDICINE

## 2019-02-07 PROCEDURE — 99999 PR PBB SHADOW E&M-EST. PATIENT-LVL III: CPT | Mod: PBBFAC,,, | Performed by: FAMILY MEDICINE

## 2019-02-07 RX ORDER — HYDROCODONE BITARTRATE AND ACETAMINOPHEN 10; 325 MG/1; MG/1
1 TABLET ORAL EVERY 6 HOURS PRN
Qty: 120 TABLET | Refills: 0 | Status: SHIPPED | OUTPATIENT
Start: 2019-02-13 | End: 2019-03-14 | Stop reason: SDUPTHER

## 2019-02-07 NOTE — PROGRESS NOTES
Subjective:       Patient ID: Cheyenne Leach is a 85 y.o. female.    Chief Complaint: Follow-up (surgery clearance)    Pt is a 85 y.o. female who presents for check up for pre-op clearance for L eye cataract. Doing well on current meds. Denies any side effects. Prevention is up to date.      Review of Systems   Constitutional: Negative for appetite change, chills and fever.   HENT: Negative for hearing loss, rhinorrhea, sinus pressure, sore throat and trouble swallowing.         Poor vision with her L eye from cataract   Respiratory: Negative for cough, chest tightness, shortness of breath and wheezing.    Cardiovascular: Negative for chest pain and palpitations.   Gastrointestinal: Negative for abdominal pain, blood in stool, diarrhea, nausea and vomiting.   Genitourinary: Negative for dysuria, flank pain, hematuria, pelvic pain, urgency, vaginal bleeding, vaginal discharge and vaginal pain.   Musculoskeletal: Positive for back pain and gait problem. Negative for joint swelling and neck stiffness.   Skin: Negative for rash.   Neurological: Negative for dizziness, weakness, light-headedness, numbness and headaches.   Hematological: Does not bruise/bleed easily.   Psychiatric/Behavioral: Negative for agitation. The patient is not nervous/anxious.        Objective:      Physical Exam   Constitutional: She is oriented to person, place, and time. She appears well-developed and well-nourished.   HENT:   Head: Normocephalic.   Eyes: Pupils are equal, round, and reactive to light.   Neck: Normal range of motion. Neck supple. No thyromegaly present.   Cardiovascular: Normal rate and regular rhythm.   Pulmonary/Chest: No respiratory distress. She has no wheezes. She has no rales. She exhibits no tenderness.   Abdominal: She exhibits no distension. There is no tenderness. There is no rebound and no guarding.   Musculoskeletal: Normal range of motion. She exhibits no edema or tenderness.   Lymphadenopathy:     She has no  cervical adenopathy.   Neurological: She is alert and oriented to person, place, and time. She has normal reflexes. She displays normal reflexes. No cranial nerve deficit. She exhibits normal muscle tone. Coordination normal.   Skin: Skin is warm and dry. No rash noted. No pallor.   Psychiatric: She has a normal mood and affect. Judgment and thought content normal.       Assessment:       1. Pre-operative clearance    2. Age-related cataract of both eyes, unspecified age-related cataract type        Plan:   Cheyenne was seen today for follow-up.    Diagnoses and all orders for this visit:    Pre-operative clearance    Age-related cataract of both eyes, unspecified age-related cataract type    Other orders  -     HYDROcodone-acetaminophen (NORCO)  mg per tablet; Take 1 tablet by mouth every 6 (six) hours as needed.

## 2019-02-12 ENCOUNTER — HOSPITAL ENCOUNTER (OUTPATIENT)
Dept: PREADMISSION TESTING | Facility: HOSPITAL | Age: 84
Discharge: HOME | End: 2019-02-12
Attending: OPHTHALMOLOGY
Payer: MEDICARE

## 2019-02-12 RX ORDER — PREDNISOLONE SODIUM PHOSPHATE 10 MG/ML
1 SOLUTION/ DROPS OPHTHALMIC 4 TIMES DAILY
COMMUNITY
End: 2019-04-01

## 2019-02-12 RX ORDER — KETOROLAC TROMETHAMINE 5 MG/ML
1 SOLUTION OPHTHALMIC 4 TIMES DAILY
COMMUNITY
End: 2019-07-19

## 2019-02-12 NOTE — DISCHARGE INSTRUCTIONS
Pre Admit Instructions    Day and Date of your Surgery :   Thursday 2/14/19  Arrival time: 630am    · Call your doctor if you become ill before your surgery  · Someone will call you between 1 p.m. And 5 p.m.the workday before the procedure to give you an arrival time       - Before 7 a.m. Enter through Emergency Room  · You must have a responsible  to bring you home    Do NOT eat or drink anything   past midnight before your procedure day    Please    · Do not wear makeup, jewelry, nail polish or body piercings  · Bring containers/solution for contacts, dentures, bridges - these and hearing aids will be removed before your procedure  · Do not bring cash, jewelry or valuables the day of your procedure   · No smoking at least 24 hours before your procedure  · Wear clothing that is comfortable and easy to take off and put on  · Do NOT shave for at least 5 days before your surgery      Information About Your Procedure  We would like to welcome you to [facility].      1. Cafeteria Meals: 7am to 10am; 11am to 1:30 pm; Dinner/Supper must may be ordered between 11:00 am and 4 pm from the [facility] [place] [employer] Menu. Food will be available to  between 5 pm and 6 pm. The kitchen phone extension is [phone].  2. After Checking in someone will escort you to 4th floor  3. Wear loose fitting clothes that button down the front.  The hospital will provide a gown.  4. Wash hair the night before surgery with your regular soap/shampoo.  5. [facility] is a non-smoking hospital.  NO SMOKING is allowed inside or outside of the hospital.  6. The nurse will review and follow the doctors orders.  The nurse will check vital signs, lungs and heart.  A nurse will begin putting eye drops every 10 minutes until the eyes are dilated (30 mins - 1 hr).    INFORMATION ON THE SURGERY  · The Nurse Medrano from surgery will bring you to a holding area by wheel chair.  · In the holding area, an IV,  heart monitor, and oxygen will be started.  · Medication jelly will be put in your eye to numb your eye and then surgery will begin.  · You will be awake during the surgery, so if pain is felt, let the physician know at that time.  · After surgery let someone know if you feel any pain.  · The physician will put a clear patch over your eye to allow time to heal.  The physician will remove patch at the office the following day.  · The IV will be discontinued after the procedure.  · You will return upstairs after the surgery.   INFORMATION After the Surgery  · Notify nurse if you feel any eye pain, headache, weakness or dizziness.  · You must have a nurse present for the first time up to the bathroom.  · Instructions will be given on home care at time of discharge.  · The following activities cause increased pressure to the surgery eye.  Please do not lift, strain, bend down to tie shoelaces,  objects on the floor, or lie on the side of the surgery until the physician instructs you differently.  · Follow home care instructions recommended by:     Dr. Hadley  - GOALS FOR ONE DAY SURGERY:  Pain controlled by oral medication; tolerating liquids well; able to walk without feeling dizzy or weak; able to urinate without difficulty.  - PATIENT WILL NEED SOMEONE TO DRIVE HIM/HER HOME FOLLOWING SURGERY.  We do want your stay to be as pleasant as possible.  We value your business and ask that you make us aware of those things you liked or did not like about your   care on the forthcoming questionnaire so we can work on constantly improving our service.

## 2019-02-14 ENCOUNTER — HOSPITAL ENCOUNTER (OUTPATIENT)
Facility: HOSPITAL | Age: 84
Discharge: HOME | End: 2019-02-14
Attending: OPHTHALMOLOGY | Admitting: OPHTHALMOLOGY
Payer: MEDICARE

## 2019-02-14 ENCOUNTER — ANESTHESIA (OUTPATIENT)
Dept: SURGERY | Facility: HOSPITAL | Age: 84
End: 2019-02-14
Payer: MEDICARE

## 2019-02-14 ENCOUNTER — ANESTHESIA EVENT (OUTPATIENT)
Dept: SURGERY | Facility: HOSPITAL | Age: 84
End: 2019-02-14
Payer: MEDICARE

## 2019-02-14 DIAGNOSIS — H26.8 OTHER CATARACT OF LEFT EYE: Primary | ICD-10-CM

## 2019-02-14 DIAGNOSIS — H26.9 CATARACT, LEFT EYE: ICD-10-CM

## 2019-02-14 PROCEDURE — 63600175 PHARM REV CODE 636 W HCPCS: Performed by: NURSE ANESTHETIST, CERTIFIED REGISTERED

## 2019-02-14 PROCEDURE — V2632 POST CHMBR INTRAOCULAR LENS: HCPCS | Performed by: OPHTHALMOLOGY

## 2019-02-14 PROCEDURE — 37000008 HC ANESTHESIA 1ST 15 MINUTES: Performed by: OPHTHALMOLOGY

## 2019-02-14 PROCEDURE — 37000009 HC ANESTHESIA EA ADD 15 MINS: Performed by: OPHTHALMOLOGY

## 2019-02-14 PROCEDURE — 71000033 HC RECOVERY, INTIAL HOUR: Performed by: OPHTHALMOLOGY

## 2019-02-14 PROCEDURE — 25000003 PHARM REV CODE 250: Performed by: OPHTHALMOLOGY

## 2019-02-14 PROCEDURE — S5010 5% DEXTROSE AND 0.45% SALINE: HCPCS | Performed by: OPHTHALMOLOGY

## 2019-02-14 PROCEDURE — 00142 ANES PX ON EYE LENS SURGERY: CPT | Mod: QZ,P2 | Performed by: NURSE ANESTHETIST, CERTIFIED REGISTERED

## 2019-02-14 PROCEDURE — 36000707: Performed by: OPHTHALMOLOGY

## 2019-02-14 PROCEDURE — 36000706: Performed by: OPHTHALMOLOGY

## 2019-02-14 DEVICE — IMPLANTABLE DEVICE: Type: IMPLANTABLE DEVICE | Site: EYE | Status: FUNCTIONAL

## 2019-02-14 RX ORDER — SODIUM CHLORIDE, SODIUM LACTATE, POTASSIUM CHLORIDE, CALCIUM CHLORIDE 600; 310; 30; 20 MG/100ML; MG/100ML; MG/100ML; MG/100ML
INJECTION, SOLUTION INTRAVENOUS CONTINUOUS
Status: DISCONTINUED | OUTPATIENT
Start: 2019-02-14 | End: 2019-02-14 | Stop reason: HOSPADM

## 2019-02-14 RX ORDER — ACETAMINOPHEN 325 MG/1
650 TABLET ORAL EVERY 4 HOURS PRN
Status: DISCONTINUED | OUTPATIENT
Start: 2019-02-14 | End: 2019-02-14 | Stop reason: HOSPADM

## 2019-02-14 RX ORDER — CYCLOPENTOLATE HYDROCHLORIDE 10 MG/ML
1 SOLUTION/ DROPS OPHTHALMIC
Status: COMPLETED | OUTPATIENT
Start: 2019-02-14 | End: 2019-02-14

## 2019-02-14 RX ORDER — TETRACAINE HYDROCHLORIDE 5 MG/ML
1 SOLUTION OPHTHALMIC
Status: ACTIVE | OUTPATIENT
Start: 2019-02-14

## 2019-02-14 RX ORDER — DEXTROSE MONOHYDRATE AND SODIUM CHLORIDE 5; .45 G/100ML; G/100ML
INJECTION, SOLUTION INTRAVENOUS CONTINUOUS
Status: DISCONTINUED | OUTPATIENT
Start: 2019-02-14 | End: 2019-02-14 | Stop reason: HOSPADM

## 2019-02-14 RX ORDER — LIDOCAINE HYDROCHLORIDE 20 MG/ML
JELLY TOPICAL ONCE
Status: COMPLETED | OUTPATIENT
Start: 2019-02-14 | End: 2019-02-14

## 2019-02-14 RX ORDER — PROPOFOL 10 MG/ML
VIAL (ML) INTRAVENOUS
Status: DISCONTINUED | OUTPATIENT
Start: 2019-02-14 | End: 2019-02-14

## 2019-02-14 RX ORDER — PHENYLEPHRINE HYDROCHLORIDE 25 MG/ML
1 SOLUTION/ DROPS OPHTHALMIC
Status: COMPLETED | OUTPATIENT
Start: 2019-02-14 | End: 2019-02-14

## 2019-02-14 RX ORDER — ONDANSETRON 2 MG/ML
4 INJECTION INTRAMUSCULAR; INTRAVENOUS EVERY 12 HOURS PRN
Status: DISCONTINUED | OUTPATIENT
Start: 2019-02-14 | End: 2019-02-14 | Stop reason: HOSPADM

## 2019-02-14 RX ADMIN — DEXTROSE AND SODIUM CHLORIDE: 5; .45 INJECTION, SOLUTION INTRAVENOUS at 08:02

## 2019-02-14 RX ADMIN — PHENYLEPHRINE HYDROCHLORIDE 1 DROP: 25 SOLUTION/ DROPS OPHTHALMIC at 08:02

## 2019-02-14 RX ADMIN — PROPOFOL 15 MG: 10 INJECTION, EMULSION INTRAVENOUS at 08:02

## 2019-02-14 RX ADMIN — LIDOCAINE HYDROCHLORIDE 10 ML: 20 JELLY TOPICAL at 08:02

## 2019-02-14 RX ADMIN — CYCLOPENTOLATE HYDROCHLORIDE 1 DROP: 10 SOLUTION/ DROPS OPHTHALMIC at 08:02

## 2019-02-14 RX ADMIN — TETRACAINE HYDROCHLORIDE 1 DROP: 5 SOLUTION OPHTHALMIC at 07:02

## 2019-02-14 NOTE — ANESTHESIA PREPROCEDURE EVALUATION
02/14/2019  Cheyenne Leach is a 85 y.o., female.    Anesthesia Evaluation    I have reviewed the Patient Summary Reports.    I have reviewed the Nursing Notes.   I have reviewed the Medications.     Review of Systems  Anesthesia Hx:  No problems with previous Anesthesia  History of prior surgery of interest to airway management or planning:  Denies Personal Hx of Anesthesia complications.   Social:  Non-Smoker, No Alcohol Use    Hematology/Oncology:  Hematology Normal   Oncology Normal     EENT/Dental:EENT/Dental Normal   Cardiovascular:   Exercise tolerance: good Hypertension, well controlled Angina (lumbar nerve root injury, sciatica)    Pulmonary:  Pulmonary Normal    Musculoskeletal:   Arthritis   Spine Disorders: lumbar Disc disease and Degenerative disease    Neurological:   Neuromuscular Disease, lumbar nerve root injury, sciatica, fibromyalgia  Peripheral Neuropathy    Endocrine:   Hypothyroidism    Dermatological:  Skin Normal    Psych:  Psychiatric Normal           Physical Exam  General:  Obesity    Airway/Jaw/Neck:  Airway Findings: Mouth Opening: Normal Tongue: Normal  General Airway Assessment: Adult  Mallampati: II  TM Distance: Normal, at least 6 cm            Mental Status:  Mental Status Findings:  Cooperative, Alert and Oriented         Anesthesia Plan  Type of Anesthesia, risks & benefits discussed:  Anesthesia Type:  MAC  Patient's Preference:   Intra-op Monitoring Plan: standard ASA monitors  Intra-op Monitoring Plan Comments:   Post Op Pain Control Plan:   Post Op Pain Control Plan Comments:   Induction:   IV  Beta Blocker:  Patient is not currently on a Beta-Blocker (No further documentation required).       Informed Consent: Patient understands risks and agrees with Anesthesia plan.  Questions answered. Anesthesia consent signed with patient.  ASA Score: 2     Day of Surgery Review  of History & Physical: I have interviewed and examined the patient. I have reviewed the patient's H&P dated: 2/14/19. There are no significant changes.  H&P update referred to the surgeon.     Anesthesia Plan Notes: Medical clearance on chart per Dr Lynch 2/7/19        Ready For Surgery From Anesthesia Perspective.

## 2019-02-14 NOTE — ANESTHESIA POSTPROCEDURE EVALUATION
Anesthesia Post Evaluation    Patient: Cheyenne Leach    Procedure(s) Performed: Procedure(s) (LRB):  PHACOEMULSIFICATION, CATARACT (Left)  EXTRACTION, CATARACT, WITH IOL INSERTION (Left)    Final Anesthesia Type: MAC  Patient location during evaluation: PACU  Patient participation: Yes- Able to Participate  Level of consciousness: awake and alert, oriented and awake  Post-procedure vital signs: reviewed and stable  Pain management: adequate  Airway patency: patent  PONV status at discharge: No PONV  Anesthetic complications: no      Cardiovascular status: blood pressure returned to baseline, hemodynamically stable and stable  Respiratory status: unassisted, spontaneous ventilation and room air  Hydration status: euvolemic  Follow-up not needed.        Visit Vitals  BP (!) 141/72 (BP Location: Right arm, Patient Position: Sitting)   Pulse 70   Temp 36.7 °C (98 °F) (Oral)   Resp 18   SpO2 (!) 94%   Breastfeeding? No       Pain/Seymour Score: No Data Recorded

## 2019-02-14 NOTE — OP NOTE
DATE OF PROCEDURE: 02/14/2019    PREOPERATIVE DIAGNOSIS: Cataract OS    POSTOPERATIVE DIAGNOSIS: Same    PROCEDURE PERFORMED:   1.  Cataract extraction with phacoemulsification and posterior   chamber intraocular lens placement OS    SURGEON: Bandar Jerry MD    COMPLICATIONS: None.     BLOOD LOSS: Less than 5 mL.     ANESTHESIA: Monitored anesthesia care.     IMPLANT: See chart    PROCEDURE IN DETAIL: After informed consent including the risks, benefits and alternatives, the patient was brought to the Operating Room table and placed in a supine position. Monitored anesthesia care was used throughout the entire procedure without any complications. Once adequate anesthesia was achieved with topical tetracaine, the patient was then prepped and draped in usual sterile fashion for intraocular surgery.  A 1.0mm sideport blade was used to make a paracentesis wound.  Viscoat was used to fill the anterior chamber. A 2.4 mm keratome blade was then used to make a clear corneal cataract wound. Cystotome was used to make a tear in the anterior capsular flap, which was continued around with Utrata forceps for continuous curvilinear capsulorrhexis. BSS was then used for hydrodissection and hydrodelineation of lens. The lens was noted to spin freely in the bag. Lens was then removed in a divide and conquer manner with the phacoemulsification handpiece.  Irrigation and aspiration handpiece was then used to remove the remaining cortical material. A viscocohesive was then used to fill the capsular bag and an intraocular lens  was placed in the bag without any complications. Irrigation aspiration handpiece removed the remaining viscocohesive and the wounds were hydrated with BSS.The eye was noted to have a good physiological pressure and all wounds were water tight. The lid speculum was removed under microscope without any shallowing. Topical Tobradex ointment was placed in the eye. The eye was then covered with a shield. The patient  tolerated the procedure well without any complication. The patient will follow-up with the next day.

## 2019-02-14 NOTE — INTERVAL H&P NOTE
H&P completed on : 2/7/15  It has been reviewed, the patient has been examined and:  I concur with the findings and no changes have occurred since H&P was written.      Patient cleared for Anesthesia:  MAC    Anesthesia/Surgery risks, benefits, and alternative options discussed and understood by patient/family.    Active Hospital Problems    Diagnosis  POA    Cataract, left eye [H26.9]  Yes      Resolved Hospital Problems   No resolved problems to display.       Family History   Problem Relation Age of Onset    Cancer Mother         renal    Tuberculosis Father        Social History     Socioeconomic History    Marital status:      Spouse name: None    Number of children: None    Years of education: None    Highest education level: None   Social Needs    Financial resource strain: None    Food insecurity - worry: None    Food insecurity - inability: None    Transportation needs - medical: None    Transportation needs - non-medical: None   Occupational History    None   Tobacco Use    Smoking status: Never Smoker    Smokeless tobacco: Never Used   Substance and Sexual Activity    Alcohol use: No    Drug use: No    Sexual activity: No   Other Topics Concern    None   Social History Narrative    None       Review of patient's allergies indicates:   Allergen Reactions    Cymbalta [duloxetine] Nausea And Vomiting    Lyrica  [pregabalin]      Other reaction(s): Vomiting    Ropinirole Nausea And Vomiting    Statins-hmg-coa reductase inhibitors      Other reaction(s): Muscle pain    Sulfa (sulfonamide antibiotics) Rash

## 2019-02-14 NOTE — BRIEF OP NOTE
Operative Note     SUMMARY     Surgery Date: 2/14/2019     Surgeon(s) and Role:     * Herbie Jerry MD - Primary    Pre-op Diagnosis:  Cataract, left eye [H26.9]    Post-op Diagnosis:  same    Procedure(s) (LRB):  PHACOEMULSIFICATION, CATARACT (Left)  EXTRACTION, CATARACT, WITH IOL INSERTION (Left)    Anesthesia: Monitor Anesthesia Care    Estimated Blood Loss: None             Discharge Note      SUMMARY     Admit Date: 2/14/2019    Attending Physician: Herbie Jerry MD     Discharge Physician: Herbie Jerry MD    Discharge Date: 2/14/2019     Condition at Discharge: Good    Problem List:   Active Hospital Problems   No active problems to display.      Resolved Hospital Problems    Diagnosis Date Resolved POA    Cataract, left eye [H26.9] 02/14/2019 Yes       Final Diagnosis: Same    Disposition: Home or Self Care    Patient Instructions:   Current Discharge Medication List      CONTINUE these medications which have NOT CHANGED    Details   amitriptyline (ELAVIL) 25 MG tablet Take 1 tablet (25 mg total) by mouth every evening.  Qty: 30 tablet, Refills: 5      bumetanide (BUMEX) 1 MG tablet TAKE in the AM FOR SEVERE SWELLING OF HER LEGS  Qty: 90 tablet, Refills: 3    Associated Diagnoses: Bilateral leg pain      ciprofloxacin HCl (CILOXAN) 0.3 % Oint Place into both eyes 4 (four) times daily.      cloNIDine (CATAPRES) 0.1 MG tablet Take 0.5 tablets (0.05 mg total) by mouth 2 (two) times daily.  Qty: 180 tablet, Refills: 3    Associated Diagnoses: Essential hypertension      diclofenac (VOLTAREN) 50 MG EC tablet Take 1 tablet (50 mg total) by mouth 2 (two) times daily.  Qty: 60 tablet, Refills: 0    Associated Diagnoses: Bunion of great toe of left foot; Bunion of great toe of right foot      diclofenac sodium (SOLARAZE) 3 % gel Apply qid to back. knees  Qty: 500 g, Refills: 5      diclofenac sodium (VOLTAREN) 1 % Gel APPLY TOPICALLY 4 TIMES A DAY AS NEEDED  Qty: 5 Tube, Refills: 3    Associated  Diagnoses: Bilateral leg pain; Chronic pain syndrome; Fibromyalgia      diltiaZEM (CARDIZEM CD) 240 MG 24 hr capsule One po nightly for HTN  Qty: 90 capsule, Refills: 3      ergocalciferol (VITAMIN D2) 50,000 unit Cap Take 1 capsule (50,000 Units total) by mouth every 7 days.  Qty: 24 capsule, Refills: 1    Associated Diagnoses: Bilateral leg pain; Fibromyalgia; Neuropathy of lower extremity, unspecified laterality      gabapentin (NEURONTIN) 300 MG capsule TAKE 2 CAPSULE BY MOUTH BREAKFAST AND LUNCH AND 3 AT BEDTIME  Qty: 210 capsule, Refills: 3      HYDROcodone-acetaminophen (NORCO)  mg per tablet Take 1 tablet by mouth every 6 (six) hours as needed.  Qty: 120 tablet, Refills: 0      ketorolac 0.5% (ACULAR) 0.5 % Drop 1 drop 4 (four) times daily.      levothyroxine (SYNTHROID) 75 MCG tablet TAKE 1 TABLET (75 MCG TOTAL) BY MOUTH ONCE DAILY.  Qty: 90 tablet, Refills: 3    Associated Diagnoses: Hypothyroidism due to acquired atrophy of thyroid      omeprazole (PRILOSEC) 20 MG capsule Take 1 capsule (20 mg total) by mouth once daily. 1 Capsule(s) Oral PRN Every day.  Qty: 30 capsule, Refills: 11    Associated Diagnoses: Abdominal adhesions      ondansetron (ZOFRAN) 4 MG tablet TAKE 1 TABLET (4 MG TOTAL) BY MOUTH EVERY 6 (SIX) HOURS AS NEEDED.  Qty: 10 tablet, Refills: 1    Comments: DX Code Needed  .  Associated Diagnoses: Chronic pain      prednisoLONE sodium phosphate (INFLAMASE FORTE) 1 % Drop 1 drop 4 (four) times daily.             Discharge Procedure Orders (must include Diet, Follow-up, Activity)   Discharge Procedure Orders (must include Diet, Follow-up, Activity)   Diet general     Lifting restrictions     Leave dressing on - Keep it clean, dry, and intact until clinic visit        Follow-up Information     Nelly Becerra OD In 1 day.    Specialty:  Optometry  Contact information:  [address] [place] [address]  [phone]

## 2019-02-20 VITALS
HEART RATE: 73 BPM | DIASTOLIC BLOOD PRESSURE: 75 MMHG | TEMPERATURE: 98 F | OXYGEN SATURATION: 95 % | RESPIRATION RATE: 18 BRPM | SYSTOLIC BLOOD PRESSURE: 168 MMHG

## 2019-03-14 ENCOUNTER — TELEPHONE (OUTPATIENT)
Dept: FAMILY MEDICINE | Facility: CLINIC | Age: 84
End: 2019-03-14

## 2019-03-14 ENCOUNTER — OFFICE VISIT (OUTPATIENT)
Dept: FAMILY MEDICINE | Facility: CLINIC | Age: 84
End: 2019-03-14
Payer: MEDICARE

## 2019-03-14 VITALS
SYSTOLIC BLOOD PRESSURE: 140 MMHG | BODY MASS INDEX: 26.05 KG/M2 | DIASTOLIC BLOOD PRESSURE: 62 MMHG | HEART RATE: 76 BPM | WEIGHT: 147 LBS | HEIGHT: 63 IN | OXYGEN SATURATION: 96 %

## 2019-03-14 DIAGNOSIS — H25.011 CORTICAL AGE-RELATED CATARACT OF RIGHT EYE: Primary | ICD-10-CM

## 2019-03-14 DIAGNOSIS — M79.7 FIBROMYALGIA: ICD-10-CM

## 2019-03-14 DIAGNOSIS — M21.611 BUNION OF GREAT TOE OF RIGHT FOOT: ICD-10-CM

## 2019-03-14 DIAGNOSIS — M79.605 BILATERAL LEG PAIN: ICD-10-CM

## 2019-03-14 DIAGNOSIS — G89.4 CHRONIC PAIN SYNDROME: ICD-10-CM

## 2019-03-14 DIAGNOSIS — M79.604 BILATERAL LEG PAIN: ICD-10-CM

## 2019-03-14 DIAGNOSIS — M21.612 BUNION OF GREAT TOE OF LEFT FOOT: ICD-10-CM

## 2019-03-14 DIAGNOSIS — K66.0 ABDOMINAL ADHESIONS: ICD-10-CM

## 2019-03-14 PROCEDURE — 3077F SYST BP >= 140 MM HG: CPT | Mod: CPTII,S$GLB,, | Performed by: FAMILY MEDICINE

## 2019-03-14 PROCEDURE — 1101F PT FALLS ASSESS-DOCD LE1/YR: CPT | Mod: CPTII,S$GLB,, | Performed by: FAMILY MEDICINE

## 2019-03-14 PROCEDURE — 1101F PR PT FALLS ASSESS DOC 0-1 FALLS W/OUT INJ PAST YR: ICD-10-PCS | Mod: CPTII,S$GLB,, | Performed by: FAMILY MEDICINE

## 2019-03-14 PROCEDURE — 3078F PR MOST RECENT DIASTOLIC BLOOD PRESSURE < 80 MM HG: ICD-10-PCS | Mod: CPTII,S$GLB,, | Performed by: FAMILY MEDICINE

## 2019-03-14 PROCEDURE — 99999 PR PBB SHADOW E&M-EST. PATIENT-LVL III: ICD-10-PCS | Mod: PBBFAC,,, | Performed by: FAMILY MEDICINE

## 2019-03-14 PROCEDURE — 3078F DIAST BP <80 MM HG: CPT | Mod: CPTII,S$GLB,, | Performed by: FAMILY MEDICINE

## 2019-03-14 PROCEDURE — 3077F PR MOST RECENT SYSTOLIC BLOOD PRESSURE >= 140 MM HG: ICD-10-PCS | Mod: CPTII,S$GLB,, | Performed by: FAMILY MEDICINE

## 2019-03-14 PROCEDURE — 99999 PR PBB SHADOW E&M-EST. PATIENT-LVL III: CPT | Mod: PBBFAC,,, | Performed by: FAMILY MEDICINE

## 2019-03-14 PROCEDURE — 99213 OFFICE O/P EST LOW 20 MIN: CPT | Mod: S$GLB,,, | Performed by: FAMILY MEDICINE

## 2019-03-14 PROCEDURE — 99213 PR OFFICE/OUTPT VISIT, EST, LEVL III, 20-29 MIN: ICD-10-PCS | Mod: S$GLB,,, | Performed by: FAMILY MEDICINE

## 2019-03-14 RX ORDER — DICLOFENAC SODIUM 10 MG/G
GEL TOPICAL
Qty: 5 TUBE | Refills: 5 | Status: SHIPPED | OUTPATIENT
Start: 2019-03-14 | End: 2019-05-16 | Stop reason: SDUPTHER

## 2019-03-14 RX ORDER — HYDROCODONE BITARTRATE AND ACETAMINOPHEN 10; 325 MG/1; MG/1
1 TABLET ORAL EVERY 6 HOURS PRN
Qty: 120 TABLET | Refills: 0 | Status: SHIPPED | OUTPATIENT
Start: 2019-05-14 | End: 2019-04-15 | Stop reason: SDUPTHER

## 2019-03-14 RX ORDER — HYDROCODONE BITARTRATE AND ACETAMINOPHEN 10; 325 MG/1; MG/1
1 TABLET ORAL 2 TIMES DAILY PRN
Qty: 120 TABLET | Refills: 0 | Status: SHIPPED | OUTPATIENT
Start: 2019-04-14 | End: 2019-05-16 | Stop reason: SDUPTHER

## 2019-03-14 RX ORDER — HYDROCODONE BITARTRATE AND ACETAMINOPHEN 10; 325 MG/1; MG/1
1 TABLET ORAL EVERY 6 HOURS PRN
Qty: 120 TABLET | Refills: 0 | Status: SHIPPED | OUTPATIENT
Start: 2019-03-14 | End: 2019-05-16 | Stop reason: SDUPTHER

## 2019-03-14 NOTE — PROGRESS NOTES
Subjective:       Patient ID: Cheyenne Leach is a 85 y.o. female.    Chief Complaint: Follow-up (med refill)    Pt is a 85 y.o. female who presents for check up for pre-op R eye cataract clearance. Doing well on current meds. Denies any side effects. Prevention is up to date.      Review of Systems   Constitutional: Negative for appetite change, chills and fever.   HENT: Negative for rhinorrhea, sinus pressure, sore throat and trouble swallowing.         R eye cataract pre-op cl   Respiratory: Negative for cough, chest tightness, shortness of breath and wheezing.    Cardiovascular: Negative for chest pain and palpitations.   Gastrointestinal: Positive for abdominal distention. Negative for abdominal pain, blood in stool, diarrhea, nausea and vomiting.        S/p abd obstruction from adhesions   Genitourinary: Negative for dysuria, flank pain, hematuria, pelvic pain, urgency, vaginal bleeding, vaginal discharge and vaginal pain.   Musculoskeletal: Negative for back pain, joint swelling and neck stiffness.        Legs with swelling   Skin: Negative for rash.   Neurological: Negative for dizziness, weakness, light-headedness, numbness and headaches.   Hematological: Does not bruise/bleed easily.   Psychiatric/Behavioral: Negative for agitation. The patient is not nervous/anxious.        Objective:      Physical Exam   Constitutional: She is oriented to person, place, and time. She appears well-developed and well-nourished.   HENT:   Head: Normocephalic.   Eyes: Pupils are equal, round, and reactive to light.   Neck: Normal range of motion. Neck supple. No thyromegaly present.   Cardiovascular: Normal rate and regular rhythm.   Pulmonary/Chest: Breath sounds normal. No respiratory distress. She has no wheezes. She has no rales. She exhibits no tenderness.   Abdominal: She exhibits no distension. There is no tenderness. There is no rebound and no guarding.   Musculoskeletal: Normal range of motion. She exhibits edema.  She exhibits no tenderness.   Lymphadenopathy:     She has no cervical adenopathy.   Neurological: She is alert and oriented to person, place, and time. She has normal reflexes. She displays normal reflexes. No cranial nerve deficit. She exhibits normal muscle tone. Coordination normal.   Skin: Skin is warm and dry. No rash noted. No pallor.   Psychiatric: She has a normal mood and affect. Judgment and thought content normal.       Assessment:       1. 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 Chronic pain syndrome    3. Fibromyalgia    4. Abdominal adhesions        Plan:   Cheyenne was seen today for follow-up.    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  -     HYDROcodone-acetaminophen (NORCO)  mg per tablet; Take 1 tablet by mouth 2 (two) times daily as needed.  -     HYDROcodone-acetaminophen (NORCO)  mg per tablet; Take 1 tablet by mouth every 6 (six) hours as needed.    OK for R eye cataract surgery silvina PEÑALOZA

## 2019-03-14 NOTE — TELEPHONE ENCOUNTER
----- Message from Fannie Jason sent at 3/14/2019  2:48 PM CDT -----  Contact: magno with [facility]  Cheyenne Leach  MRN: [id number]  : 7/3/1933  PCP: Tomasz Lynch  Home Phone      [phone]  Work Phone      Not on file.  Mobile          [phone]      MESSAGE:   Pharmacy is calling to clarify an RX.  RX name:   HYDROcodone-acetaminophen   What do they need to clarify:  How many times to take a day  Pharmacy name and location:  [facility]  Comments:     April says 2 a day instead of four a day like the rest of the ones he received. Please advise thanks.  Phone:  [phone]

## 2019-04-01 ENCOUNTER — OFFICE VISIT (OUTPATIENT)
Dept: FAMILY MEDICINE | Facility: CLINIC | Age: 84
End: 2019-04-01
Payer: MEDICARE

## 2019-04-01 VITALS
HEIGHT: 63 IN | OXYGEN SATURATION: 95 % | RESPIRATION RATE: 15 BRPM | WEIGHT: 143 LBS | SYSTOLIC BLOOD PRESSURE: 132 MMHG | HEART RATE: 79 BPM | BODY MASS INDEX: 25.34 KG/M2 | DIASTOLIC BLOOD PRESSURE: 78 MMHG

## 2019-04-01 DIAGNOSIS — Z01.818 PRE-OPERATIVE CLEARANCE: Primary | ICD-10-CM

## 2019-04-01 DIAGNOSIS — H25.9 SENILE CATARACT OF RIGHT EYE, UNSPECIFIED AGE-RELATED CATARACT TYPE: ICD-10-CM

## 2019-04-01 DIAGNOSIS — M54.42 CHRONIC MIDLINE LOW BACK PAIN WITH BILATERAL SCIATICA: ICD-10-CM

## 2019-04-01 DIAGNOSIS — M79.604 BILATERAL LEG PAIN: ICD-10-CM

## 2019-04-01 DIAGNOSIS — G89.29 CHRONIC MIDLINE LOW BACK PAIN WITH BILATERAL SCIATICA: ICD-10-CM

## 2019-04-01 DIAGNOSIS — M54.41 CHRONIC MIDLINE LOW BACK PAIN WITH BILATERAL SCIATICA: ICD-10-CM

## 2019-04-01 DIAGNOSIS — M79.605 BILATERAL LEG PAIN: ICD-10-CM

## 2019-04-01 PROBLEM — H26.8: Status: ACTIVE | Noted: 2019-04-01

## 2019-04-01 PROCEDURE — 96372 PR INJECTION,THERAP/PROPH/DIAG2ST, IM OR SUBCUT: ICD-10-PCS | Mod: S$GLB,,, | Performed by: FAMILY MEDICINE

## 2019-04-01 PROCEDURE — 3075F PR MOST RECENT SYSTOLIC BLOOD PRESS GE 130-139MM HG: ICD-10-PCS | Mod: CPTII,S$GLB,, | Performed by: FAMILY MEDICINE

## 2019-04-01 PROCEDURE — 99213 OFFICE O/P EST LOW 20 MIN: CPT | Mod: 25,S$GLB,, | Performed by: FAMILY MEDICINE

## 2019-04-01 PROCEDURE — 99999 PR PBB SHADOW E&M-EST. PATIENT-LVL III: CPT | Mod: PBBFAC,,, | Performed by: FAMILY MEDICINE

## 2019-04-01 PROCEDURE — 99999 PR PBB SHADOW E&M-EST. PATIENT-LVL III: ICD-10-PCS | Mod: PBBFAC,,, | Performed by: FAMILY MEDICINE

## 2019-04-01 PROCEDURE — 99213 PR OFFICE/OUTPT VISIT, EST, LEVL III, 20-29 MIN: ICD-10-PCS | Mod: 25,S$GLB,, | Performed by: FAMILY MEDICINE

## 2019-04-01 PROCEDURE — 3078F PR MOST RECENT DIASTOLIC BLOOD PRESSURE < 80 MM HG: ICD-10-PCS | Mod: CPTII,S$GLB,, | Performed by: FAMILY MEDICINE

## 2019-04-01 PROCEDURE — 1101F PT FALLS ASSESS-DOCD LE1/YR: CPT | Mod: CPTII,S$GLB,, | Performed by: FAMILY MEDICINE

## 2019-04-01 PROCEDURE — 3075F SYST BP GE 130 - 139MM HG: CPT | Mod: CPTII,S$GLB,, | Performed by: FAMILY MEDICINE

## 2019-04-01 PROCEDURE — 96372 THER/PROPH/DIAG INJ SC/IM: CPT | Mod: S$GLB,,, | Performed by: FAMILY MEDICINE

## 2019-04-01 PROCEDURE — 3078F DIAST BP <80 MM HG: CPT | Mod: CPTII,S$GLB,, | Performed by: FAMILY MEDICINE

## 2019-04-01 PROCEDURE — 1101F PR PT FALLS ASSESS DOC 0-1 FALLS W/OUT INJ PAST YR: ICD-10-PCS | Mod: CPTII,S$GLB,, | Performed by: FAMILY MEDICINE

## 2019-04-01 RX ORDER — CIPROFLOXACIN HYDROCHLORIDE 3 MG/ML
SOLUTION/ DROPS OPHTHALMIC
Refills: 1 | COMMUNITY
Start: 2019-03-29 | End: 2019-07-19

## 2019-04-01 RX ORDER — BUMETANIDE 1 MG/1
TABLET ORAL
Qty: 90 TABLET | Refills: 3 | Status: SHIPPED | OUTPATIENT
Start: 2019-04-01 | End: 2019-07-19

## 2019-04-01 RX ORDER — GABAPENTIN 300 MG/1
CAPSULE ORAL
Qty: 210 CAPSULE | Refills: 3 | Status: SHIPPED | OUTPATIENT
Start: 2019-04-01 | End: 2019-07-19 | Stop reason: SDUPTHER

## 2019-04-01 RX ORDER — KETOROLAC TROMETHAMINE 30 MG/ML
30 INJECTION, SOLUTION INTRAMUSCULAR; INTRAVENOUS
Status: COMPLETED | OUTPATIENT
Start: 2019-04-01 | End: 2019-04-01

## 2019-04-01 RX ORDER — CLONIDINE 0.3 MG/24H
1 PATCH, EXTENDED RELEASE TRANSDERMAL
Qty: 4 PATCH | Refills: 11 | Status: SHIPPED | OUTPATIENT
Start: 2019-04-01 | End: 2019-07-19

## 2019-04-01 RX ORDER — PREDNISOLONE ACETATE 10 MG/ML
SUSPENSION/ DROPS OPHTHALMIC
Refills: 1 | COMMUNITY
Start: 2019-03-29 | End: 2019-07-19

## 2019-04-01 RX ORDER — AMITRIPTYLINE HYDROCHLORIDE 25 MG/1
25 TABLET, FILM COATED ORAL NIGHTLY
Qty: 30 TABLET | Refills: 5 | Status: SHIPPED | OUTPATIENT
Start: 2019-04-01 | End: 2019-07-19 | Stop reason: SDUPTHER

## 2019-04-01 RX ADMIN — KETOROLAC TROMETHAMINE 30 MG: 30 INJECTION, SOLUTION INTRAMUSCULAR; INTRAVENOUS at 10:04

## 2019-04-01 NOTE — PROGRESS NOTES
Subjective:       Patient ID: Cheyenne Leach is a 85 y.o. female.    Chief Complaint: Follow-up    Pt is a 85 y.o. female who presents for check up for R eye cataract pre- op clearance. Doing well on current meds. Denies any side effects. Prevention is up to date.    Review of Systems   Constitutional: Negative for appetite change, chills and fever.   HENT: Negative for rhinorrhea, sinus pressure, sore throat and trouble swallowing.         R eye cataract cl   Respiratory: Negative for cough, chest tightness, shortness of breath and wheezing.    Cardiovascular: Negative for chest pain and palpitations.   Gastrointestinal: Negative for abdominal pain, blood in stool, diarrhea, nausea and vomiting.   Genitourinary: Negative for dysuria, flank pain, hematuria, pelvic pain, urgency, vaginal bleeding, vaginal discharge and vaginal pain.   Musculoskeletal: Positive for back pain. Negative for joint swelling and neck stiffness.   Skin: Negative for rash.   Neurological: Negative for dizziness, weakness, light-headedness, numbness and headaches.   Hematological: Does not bruise/bleed easily.   Psychiatric/Behavioral: Negative for agitation. The patient is not nervous/anxious.        Objective:      Physical Exam   Constitutional: She is oriented to person, place, and time. She appears well-developed and well-nourished.   HENT:   Head: Normocephalic.   Eyes: Pupils are equal, round, and reactive to light.   Neck: Normal range of motion. Neck supple. No thyromegaly present.   Cardiovascular: Normal rate and regular rhythm.   Pulmonary/Chest: No respiratory distress. She has no wheezes. She has no rales. She exhibits no tenderness.   Abdominal: She exhibits no distension. There is no tenderness. There is no rebound and no guarding.   Musculoskeletal: Normal range of motion. She exhibits no edema or tenderness.   Lymphadenopathy:     She has no cervical adenopathy.   Neurological: She is alert and oriented to person, place,  and time. She has normal reflexes. She displays normal reflexes. No cranial nerve deficit. She exhibits normal muscle tone. Coordination normal.   Skin: Skin is warm and dry. No rash noted. No pallor.   Psychiatric: She has a normal mood and affect. Judgment and thought content normal.       Assessment:       1. Pre-operative clearance    2. Bilateral leg pain    3. Senile cataract of right eye, unspecified age-related cataract type    4. Chronic midline low back pain with bilateral sciatica        Plan:   Cheyenne was seen today for follow-up.    Diagnoses and all orders for this visit:    Pre-operative clearance    Bilateral leg pain  -     bumetanide (BUMEX) 1 MG tablet; TAKE in the AM FOR SEVERE SWELLING OF HER LEGS  -     ketorolac injection 30 mg    Senile cataract of right eye, unspecified age-related cataract type    Chronic midline low back pain with bilateral sciatica    Other orders  -     cloNIDine 0.3 mg/24 hr td ptwk (CATAPRES) 0.3 mg/24 hr; Place 1 patch onto the skin every 7 days.  -     amitriptyline (ELAVIL) 25 MG tablet; Take 1 tablet (25 mg total) by mouth every evening.  -     gabapentin (NEURONTIN) 300 MG capsule; TAKE 2 CAPSULE BY MOUTH BREAKFAST AND LUNCH AND 3 AT BEDTIME    OK for eye cataract surgery

## 2019-04-09 ENCOUNTER — HOSPITAL ENCOUNTER (OUTPATIENT)
Dept: PREADMISSION TESTING | Facility: HOSPITAL | Age: 84
Discharge: HOME | End: 2019-04-09
Attending: OPHTHALMOLOGY
Payer: MEDICARE

## 2019-04-09 VITALS — HEIGHT: 63 IN | BODY MASS INDEX: 25.35 KG/M2 | WEIGHT: 143.06 LBS

## 2019-04-09 DIAGNOSIS — H26.9 CATARACT, RIGHT EYE: ICD-10-CM

## 2019-04-09 DIAGNOSIS — H25.011 CORTICAL AGE-RELATED CATARACT OF RIGHT EYE: Primary | ICD-10-CM

## 2019-04-09 NOTE — DISCHARGE INSTRUCTIONS
Pre Admit Instructions    Day and Date of your Surgery :   Thursday 4/11/19  Arrival time: 715am    · Call your doctor if you become ill before your surgery       - 7 a.m. To 5 p.m. Enter through Patient Registration Main Lobby  · You must have a responsible  to bring you home    Do NOT eat or drink anything   past midnight before your procedure day    Please    · Do not wear makeup, jewelry, nail polish or body piercings  · Bring containers/solution for contacts, dentures, bridges - these and hearing aids will be removed before your procedure  · Do not bring cash, jewelry or valuables the day of your procedure   · No smoking at least 24 hours before your procedure  · Wear clothing that is comfortable and easy to take off and put on  · Do NOT shave for at least 5 days before your surgery      Information About Your Procedure  We would like to welcome you to [facility].      1. Cafeteria Meals: 7am to 10am; 11am to 1:30 pm; Dinner/Supper must may be ordered between 11:00 am and 4 pm from the [facility] After [employer] Menu. Food will be available to  between 5 pm and 6 pm. The kitchen phone extension is [phone].  2. After Checking in someone will escort you to 4th floor  3. Wear loose fitting clothes that button down the front.  The hospital will provide a gown.  4. Wash hair the night before surgery with your regular soap/shampoo.  5. [facility] is a non-smoking hospital.  NO SMOKING is allowed inside or outside of the hospital.  6. The nurse will review and follow the doctors orders.  The nurse will check vital signs, lungs and heart.  A nurse will begin putting eye drops every 10 minutes until the eyes are dilated (30 mins - 1 hr).    INFORMATION ON THE SURGERY  · The Nurse Medrano from surgery will bring you to a holding area by wheel chair.  · In the holding area, an IV, heart monitor, and oxygen will be started.  · Medication jelly will be put in your eye to  numb your eye and then surgery will begin.  · You will be awake during the surgery, so if pain is felt, let the physician know at that time.  · After surgery let someone know if you feel any pain.  · The physician will put a clear patch over your eye to allow time to heal.  The physician will remove patch at the office the following day.  · The IV will be discontinued after the procedure.  · You will return upstairs after the surgery.   INFORMATION After the Surgery  · Notify nurse if you feel any eye pain, headache, weakness or dizziness.  · You must have a nurse present for the first time up to the bathroom.  · Instructions will be given on home care at time of discharge.  · The following activities cause increased pressure to the surgery eye.  Please do not lift, strain, bend down to tie shoelaces,  objects on the floor, or lie on the side of the surgery until the physician instructs you differently.  · Follow home care instructions recommended by:     Dr. Hadley  - GOALS FOR ONE DAY SURGERY:  Pain controlled by oral medication; tolerating liquids well; able to walk without feeling dizzy or weak; able to urinate without difficulty.  - PATIENT WILL NEED SOMEONE TO DRIVE HIM/HER HOME FOLLOWING SURGERY.  We do want your stay to be as pleasant as possible.  We value your business and ask that you make us aware of those things you liked or did not like about your   care on the forthcoming questionnaire so we can work on constantly improving our service.

## 2019-04-11 ENCOUNTER — ANESTHESIA (OUTPATIENT)
Dept: SURGERY | Facility: HOSPITAL | Age: 84
End: 2019-04-11
Payer: MEDICARE

## 2019-04-11 ENCOUNTER — ANESTHESIA EVENT (OUTPATIENT)
Dept: SURGERY | Facility: HOSPITAL | Age: 84
End: 2019-04-11
Payer: MEDICARE

## 2019-04-11 PROBLEM — H26.9 CATARACT, RIGHT EYE: Status: ACTIVE | Noted: 2019-04-11

## 2019-04-11 PROBLEM — H26.9 CATARACT, RIGHT EYE: Status: RESOLVED | Noted: 2019-04-11 | Resolved: 2019-04-11

## 2019-04-11 PROCEDURE — 25000003 PHARM REV CODE 250: Performed by: NURSE ANESTHETIST, CERTIFIED REGISTERED

## 2019-04-11 PROCEDURE — 63600175 PHARM REV CODE 636 W HCPCS: Performed by: NURSE ANESTHETIST, CERTIFIED REGISTERED

## 2019-04-11 PROCEDURE — 00142 ANES PX ON EYE LENS SURGERY: CPT | Mod: QZ,P2 | Performed by: NURSE ANESTHETIST, CERTIFIED REGISTERED

## 2019-04-11 RX ORDER — PROPOFOL 10 MG/ML
VIAL (ML) INTRAVENOUS
Status: DISCONTINUED | OUTPATIENT
Start: 2019-04-11 | End: 2019-04-11

## 2019-04-11 RX ORDER — SODIUM CHLORIDE, SODIUM LACTATE, POTASSIUM CHLORIDE, CALCIUM CHLORIDE 600; 310; 30; 20 MG/100ML; MG/100ML; MG/100ML; MG/100ML
INJECTION, SOLUTION INTRAVENOUS CONTINUOUS PRN
Status: DISCONTINUED | OUTPATIENT
Start: 2019-04-11 | End: 2019-04-11

## 2019-04-11 RX ADMIN — SODIUM CHLORIDE, SODIUM LACTATE, POTASSIUM CHLORIDE, AND CALCIUM CHLORIDE: .6; .31; .03; .02 INJECTION, SOLUTION INTRAVENOUS at 09:04

## 2019-04-11 RX ADMIN — PROPOFOL 20 MG: 10 INJECTION, EMULSION INTRAVENOUS at 09:04

## 2019-04-11 NOTE — ANESTHESIA POSTPROCEDURE EVALUATION
Anesthesia Post Evaluation    Patient: Cheyenne Leach    Procedure(s) Performed: Procedure(s) (LRB):  PHACOEMULSIFICATION, CATARACT (Right)  EXTRACTION, CATARACT, WITH IOL INSERTION (Right)    Final Anesthesia Type: MAC  Patient location during evaluation: PACU  Patient participation: Yes- Able to Participate  Level of consciousness: awake and alert and oriented  Post-procedure vital signs: reviewed and stable  Pain management: adequate  Airway patency: patent  PONV status at discharge: No PONV  Anesthetic complications: no      Cardiovascular status: blood pressure returned to baseline and hemodynamically stable  Respiratory status: unassisted, spontaneous ventilation and room air  Hydration status: euvolemic  Follow-up not needed.          Vitals Value Taken Time   /84 4/11/2019  8:27 AM   Temp 97.5 4/11/2019 10:09 AM   Pulse 78 4/11/2019 10:09 AM   Resp 14 4/11/2019 10:09 AM   SpO2 98% 4/11/2019 10:09 AM         No case tracking events are documented in the log.      Pain/Seymour Score: No data recorded

## 2019-04-11 NOTE — ANESTHESIA PREPROCEDURE EVALUATION
04/11/2019  Cheyenne Leahc is a 85 y.o., female.    Pre-op Assessment    I have reviewed the Patient Summary Reports.     I have reviewed the Nursing Notes.   I have reviewed the Medications.     Review of Systems  Anesthesia Hx:  No problems with previous Anesthesia  History of prior surgery of interest to airway management or planning:  Denies Personal Hx of Anesthesia complications.   Social:  Non-Smoker, No Alcohol Use    Hematology/Oncology:  Hematology Normal   Oncology Normal     EENT/Dental:EENT/Dental Normal   Cardiovascular:   Exercise tolerance: good Hypertension, well controlled Angina: lumbar nerve root injury, sciatica.    Pulmonary:  Pulmonary Normal    Renal/:  Renal/ Normal     Hepatic/GI:  Hepatic/GI Normal    Musculoskeletal:   Arthritis   Spine Disorders: lumbar Disc disease and Degenerative disease    Neurological:   Neuromuscular Disease, lumbar nerve root injury, sciatica, fibromyalgia  Peripheral Neuropathy    Endocrine:   Hypothyroidism    Dermatological:  Skin Normal    Psych:  Psychiatric Normal           Physical Exam  General:  Obesity    Airway/Jaw/Neck:  Airway Findings: Mouth Opening: Normal Tongue: Normal  General Airway Assessment: Adult  Mallampati: II  TM Distance: Normal, at least 6 cm            Mental Status:  Mental Status Findings:  Cooperative, Alert and Oriented         Anesthesia Plan  Type of Anesthesia, risks & benefits discussed:  Anesthesia Type:  MAC  Patient's Preference:   Intra-op Monitoring Plan: standard ASA monitors  Intra-op Monitoring Plan Comments:   Post Op Pain Control Plan:   Post Op Pain Control Plan Comments:   Induction:   IV  Beta Blocker:  Patient is not currently on a Beta-Blocker (No further documentation required).       Informed Consent: Patient understands risks and agrees with Anesthesia plan.  Questions answered. Anesthesia consent  "signed with patient.  ASA Score: 2     Day of Surgery Review of History & Physical: I have interviewed and examined the patient. I have reviewed the patient's H&P dated: 4/11/19. There are no significant changes.  H&P update referred to the surgeon.     Anesthesia Plan Notes:    "OK for eye cataract surgery"        Electronically signed by Tomasz Lynch MD at 4/1/2019 10:24 AM           Ready For Surgery From Anesthesia Perspective.       "

## 2019-04-15 RX ORDER — ONDANSETRON HYDROCHLORIDE 8 MG/1
TABLET, FILM COATED ORAL
Qty: 10 TABLET | Refills: 4 | Status: SHIPPED | OUTPATIENT
Start: 2019-04-15 | End: 2019-07-19 | Stop reason: SDUPTHER

## 2019-04-15 RX ORDER — HYDROCODONE BITARTRATE AND ACETAMINOPHEN 10; 325 MG/1; MG/1
1 TABLET ORAL EVERY 6 HOURS PRN
Qty: 120 TABLET | Refills: 0 | Status: SHIPPED | OUTPATIENT
Start: 2019-04-15 | End: 2019-05-16 | Stop reason: SDUPTHER

## 2019-04-15 NOTE — TELEPHONE ENCOUNTER
----- Message from Anna Heredia sent at 4/15/2019 11:07 AM CDT -----  Contact: Matias Leach  MRN: [id number]  : 7/3/1933  PCP: Tomasz Lynch  Home Phone      [phone]  Work Phone      Not on file.  Mobile          [phone]      MESSAGE:   Pt requesting refill or new Rx.   RX name and strength: AdScoot  Pharmacy name and location:  [facility] in [place]  Comments:  Patient states [facility] told her this medication must be approved to be refilled even though it was due yesterday. Please advise    Phone:  [phone]

## 2019-05-03 DIAGNOSIS — M79.7 FIBROMYALGIA: ICD-10-CM

## 2019-05-03 DIAGNOSIS — M79.604 BILATERAL LEG PAIN: ICD-10-CM

## 2019-05-03 DIAGNOSIS — M79.605 BILATERAL LEG PAIN: ICD-10-CM

## 2019-05-03 DIAGNOSIS — G89.4 CHRONIC PAIN SYNDROME: ICD-10-CM

## 2019-05-03 RX ORDER — DICLOFENAC SODIUM 10 MG/G
GEL TOPICAL
Qty: 5 TUBE | Refills: 11 | Status: SHIPPED | OUTPATIENT
Start: 2019-05-03 | End: 2019-07-19 | Stop reason: SDUPTHER

## 2019-05-16 ENCOUNTER — OFFICE VISIT (OUTPATIENT)
Dept: FAMILY MEDICINE | Facility: CLINIC | Age: 84
End: 2019-05-16
Payer: MEDICARE

## 2019-05-16 ENCOUNTER — APPOINTMENT (OUTPATIENT)
Dept: RADIOLOGY | Facility: CLINIC | Age: 84
End: 2019-05-16
Attending: FAMILY MEDICINE
Payer: MEDICARE

## 2019-05-16 VITALS
SYSTOLIC BLOOD PRESSURE: 148 MMHG | RESPIRATION RATE: 18 BRPM | DIASTOLIC BLOOD PRESSURE: 70 MMHG | HEIGHT: 63 IN | HEART RATE: 80 BPM | BODY MASS INDEX: 24.24 KG/M2 | WEIGHT: 136.81 LBS

## 2019-05-16 DIAGNOSIS — G89.4 CHRONIC PAIN SYNDROME: ICD-10-CM

## 2019-05-16 DIAGNOSIS — M79.7 FIBROMYALGIA: ICD-10-CM

## 2019-05-16 DIAGNOSIS — M54.50 LOW BACK PAIN, NON-SPECIFIC: ICD-10-CM

## 2019-05-16 DIAGNOSIS — M79.605 BILATERAL LEG PAIN: ICD-10-CM

## 2019-05-16 DIAGNOSIS — M79.604 BILATERAL LEG PAIN: ICD-10-CM

## 2019-05-16 PROCEDURE — 99999 PR PBB SHADOW E&M-EST. PATIENT-LVL III: ICD-10-PCS | Mod: PBBFAC,,, | Performed by: FAMILY MEDICINE

## 2019-05-16 PROCEDURE — 3288F PR FALLS RISK ASSESSMENT DOCUMENTED: ICD-10-PCS | Mod: CPTII,S$GLB,, | Performed by: FAMILY MEDICINE

## 2019-05-16 PROCEDURE — 96372 PR INJECTION,THERAP/PROPH/DIAG2ST, IM OR SUBCUT: ICD-10-PCS | Mod: S$GLB,,, | Performed by: FAMILY MEDICINE

## 2019-05-16 PROCEDURE — 99213 OFFICE O/P EST LOW 20 MIN: CPT | Mod: 25,S$GLB,, | Performed by: FAMILY MEDICINE

## 2019-05-16 PROCEDURE — 3077F PR MOST RECENT SYSTOLIC BLOOD PRESSURE >= 140 MM HG: ICD-10-PCS | Mod: CPTII,S$GLB,, | Performed by: FAMILY MEDICINE

## 2019-05-16 PROCEDURE — 99999 PR PBB SHADOW E&M-EST. PATIENT-LVL III: CPT | Mod: PBBFAC,,, | Performed by: FAMILY MEDICINE

## 2019-05-16 PROCEDURE — 1100F PTFALLS ASSESS-DOCD GE2>/YR: CPT | Mod: CPTII,S$GLB,, | Performed by: FAMILY MEDICINE

## 2019-05-16 PROCEDURE — 3077F SYST BP >= 140 MM HG: CPT | Mod: CPTII,S$GLB,, | Performed by: FAMILY MEDICINE

## 2019-05-16 PROCEDURE — 3078F DIAST BP <80 MM HG: CPT | Mod: CPTII,S$GLB,, | Performed by: FAMILY MEDICINE

## 2019-05-16 PROCEDURE — 99213 PR OFFICE/OUTPT VISIT, EST, LEVL III, 20-29 MIN: ICD-10-PCS | Mod: 25,S$GLB,, | Performed by: FAMILY MEDICINE

## 2019-05-16 PROCEDURE — 96372 THER/PROPH/DIAG INJ SC/IM: CPT | Mod: S$GLB,,, | Performed by: FAMILY MEDICINE

## 2019-05-16 PROCEDURE — 3078F PR MOST RECENT DIASTOLIC BLOOD PRESSURE < 80 MM HG: ICD-10-PCS | Mod: CPTII,S$GLB,, | Performed by: FAMILY MEDICINE

## 2019-05-16 PROCEDURE — 72100 X-RAY EXAM L-S SPINE 2/3 VWS: CPT | Mod: TC,PO

## 2019-05-16 PROCEDURE — 1100F PR PT FALLS ASSESS DOC 2+ FALLS/FALL W/INJURY/YR: ICD-10-PCS | Mod: CPTII,S$GLB,, | Performed by: FAMILY MEDICINE

## 2019-05-16 PROCEDURE — 3288F FALL RISK ASSESSMENT DOCD: CPT | Mod: CPTII,S$GLB,, | Performed by: FAMILY MEDICINE

## 2019-05-16 RX ORDER — HYDROCODONE BITARTRATE AND ACETAMINOPHEN 10; 325 MG/1; MG/1
1 TABLET ORAL EVERY 6 HOURS PRN
Qty: 120 TABLET | Refills: 0 | Status: SHIPPED | OUTPATIENT
Start: 2019-06-16 | End: 2019-07-19 | Stop reason: SDUPTHER

## 2019-05-16 RX ORDER — DICLOFENAC SODIUM 10 MG/G
GEL TOPICAL
Qty: 5 TUBE | Refills: 3 | Status: SHIPPED | OUTPATIENT
Start: 2019-05-16 | End: 2019-07-19 | Stop reason: SDUPTHER

## 2019-05-16 RX ORDER — CLONIDINE HYDROCHLORIDE 0.1 MG/1
TABLET ORAL
Refills: 3 | COMMUNITY
Start: 2019-05-03 | End: 2019-07-19 | Stop reason: SDUPTHER

## 2019-05-16 RX ORDER — HYDROCODONE BITARTRATE AND ACETAMINOPHEN 10; 325 MG/1; MG/1
1 TABLET ORAL EVERY 6 HOURS PRN
Qty: 120 TABLET | Refills: 0 | Status: SHIPPED | OUTPATIENT
Start: 2019-05-16 | End: 2019-07-19 | Stop reason: SDUPTHER

## 2019-05-16 RX ORDER — HYDROCODONE BITARTRATE AND ACETAMINOPHEN 10; 325 MG/1; MG/1
1 TABLET ORAL EVERY 6 HOURS PRN
Qty: 120 TABLET | Refills: 0 | Status: SHIPPED | OUTPATIENT
Start: 2019-07-16 | End: 2019-07-19 | Stop reason: SDUPTHER

## 2019-05-16 RX ORDER — KETOROLAC TROMETHAMINE 30 MG/ML
30 INJECTION, SOLUTION INTRAMUSCULAR; INTRAVENOUS
Status: COMPLETED | OUTPATIENT
Start: 2019-05-16 | End: 2019-05-16

## 2019-05-16 RX ADMIN — KETOROLAC TROMETHAMINE 30 MG: 30 INJECTION, SOLUTION INTRAMUSCULAR; INTRAVENOUS at 02:05

## 2019-05-16 NOTE — PROGRESS NOTES
Subjective:       Patient ID: Cheyenne Leach is a 85 y.o. female.    Chief Complaint: Pain (general)    Pt is a 85 y.o. female who presents for check up for Bilateral leg pain with L > R with 9/10 pain into L hip  Chronic pain syndrome  Fibromyalgia. Doing well on current meds. Denies any side effects. Prevention is up to date.    Review of Systems   Constitutional: Negative for appetite change, chills and fever.        DNR is her wishes   HENT: Negative for rhinorrhea, sinus pressure, sore throat and trouble swallowing.    Respiratory: Negative for cough, chest tightness, shortness of breath and wheezing.    Cardiovascular: Negative for chest pain and palpitations.   Gastrointestinal: Negative for abdominal pain, blood in stool, diarrhea, nausea and vomiting.   Genitourinary: Negative for dysuria, flank pain, hematuria, pelvic pain, urgency, vaginal bleeding, vaginal discharge and vaginal pain.   Musculoskeletal: Positive for arthralgias, back pain and gait problem. Negative for joint swelling and neck stiffness.        L hip aches plenty   Skin: Negative for rash.   Neurological: Negative for dizziness, weakness, light-headedness, numbness and headaches.        + SLR james L>R   Hematological: Does not bruise/bleed easily.   Psychiatric/Behavioral: Negative for agitation. The patient is not nervous/anxious.        Objective:      Physical Exam   Constitutional: She is oriented to person, place, and time. She appears well-developed and well-nourished.   HENT:   Head: Normocephalic.   Eyes: Pupils are equal, round, and reactive to light.   Neck: Normal range of motion. Neck supple. No thyromegaly present.   Cardiovascular: Normal rate and regular rhythm.   Pulmonary/Chest: No respiratory distress. She has no wheezes. She has no rales. She exhibits no tenderness.   Abdominal: She exhibits no distension. There is no tenderness. There is no rebound and no guarding.   Musculoskeletal: Normal range of motion. She  exhibits no edema or tenderness.   +SLR   Lymphadenopathy:     She has no cervical adenopathy.   Neurological: She is alert and oriented to person, place, and time. She has normal reflexes. She displays normal reflexes. No cranial nerve deficit. She exhibits normal muscle tone. Coordination normal.   Skin: Skin is warm and dry. No rash noted. No pallor.   Psychiatric: She has a normal mood and affect. Judgment and thought content normal.       Assessment:       1. Bilateral leg pain    2. Chronic pain syndrome    3. Fibromyalgia        Plan:   Cheyenne was seen today for pain.    Diagnoses and all orders for this visit:    Bilateral leg pain  -     diclofenac sodium (VOLTAREN) 1 % Gel; APPLY 4gms TOPICALLY 4 TIMES A DAY AS NEEDED    Chronic pain syndrome  -     diclofenac sodium (VOLTAREN) 1 % Gel; APPLY 4gms TOPICALLY 4 TIMES A DAY AS NEEDED    Fibromyalgia  -     diclofenac sodium (VOLTAREN) 1 % Gel; APPLY 4gms TOPICALLY 4 TIMES A DAY AS NEEDED    RTC prn

## 2019-07-08 ENCOUNTER — HOSPITAL ENCOUNTER (EMERGENCY)
Facility: HOSPITAL | Age: 84
Discharge: ANOTHER HOSPITAL | End: 2019-07-08
Attending: INTERNAL MEDICINE
Payer: MEDICARE

## 2019-07-08 VITALS
WEIGHT: 135 LBS | RESPIRATION RATE: 18 BRPM | SYSTOLIC BLOOD PRESSURE: 186 MMHG | BODY MASS INDEX: 23.92 KG/M2 | HEIGHT: 63 IN | OXYGEN SATURATION: 100 % | TEMPERATURE: 98 F | DIASTOLIC BLOOD PRESSURE: 86 MMHG | HEART RATE: 90 BPM

## 2019-07-08 DIAGNOSIS — K92.2 UGI BLEED: Primary | ICD-10-CM

## 2019-07-08 DIAGNOSIS — R10.9 ABDOMINAL PAIN: ICD-10-CM

## 2019-07-08 DIAGNOSIS — K92.2 GI BLEED: ICD-10-CM

## 2019-07-08 LAB
ALBUMIN SERPL BCP-MCNC: 3.9 G/DL (ref 3.5–5.2)
ALP SERPL-CCNC: 68 U/L (ref 55–135)
ALT SERPL W/O P-5'-P-CCNC: 10 U/L (ref 10–44)
ANION GAP SERPL CALC-SCNC: 11 MMOL/L (ref 8–16)
AST SERPL-CCNC: 11 U/L (ref 10–40)
BASOPHILS # BLD AUTO: 0.01 K/UL (ref 0–0.2)
BASOPHILS NFR BLD: 0.2 % (ref 0–1.9)
BILIRUB SERPL-MCNC: 0.4 MG/DL (ref 0.1–1)
BUN SERPL-MCNC: 42 MG/DL (ref 8–23)
CALCIUM SERPL-MCNC: 9 MG/DL (ref 8.7–10.5)
CHLORIDE SERPL-SCNC: 103 MMOL/L (ref 95–110)
CO2 SERPL-SCNC: 26 MMOL/L (ref 23–29)
CREAT SERPL-MCNC: 0.8 MG/DL (ref 0.5–1.4)
DIFFERENTIAL METHOD: ABNORMAL
EOSINOPHIL # BLD AUTO: 0 K/UL (ref 0–0.5)
EOSINOPHIL NFR BLD: 0.2 % (ref 0–8)
ERYTHROCYTE [DISTWIDTH] IN BLOOD BY AUTOMATED COUNT: 15 % (ref 11.5–14.5)
EST. GFR  (AFRICAN AMERICAN): >60 ML/MIN/1.73 M^2
EST. GFR  (NON AFRICAN AMERICAN): >60 ML/MIN/1.73 M^2
GLUCOSE SERPL-MCNC: 123 MG/DL (ref 70–110)
HCT VFR BLD AUTO: 29 % (ref 37–48.5)
HGB BLD-MCNC: 9.2 G/DL (ref 12–16)
IMM GRANULOCYTES # BLD AUTO: 0.14 K/UL (ref 0–0.04)
IMM GRANULOCYTES NFR BLD AUTO: 2.4 % (ref 0–0.5)
LIPASE SERPL-CCNC: 12 U/L (ref 4–60)
LYMPHOCYTES # BLD AUTO: 1.3 K/UL (ref 1–4.8)
LYMPHOCYTES NFR BLD: 21.1 % (ref 18–48)
MCH RBC QN AUTO: 28.6 PG (ref 27–31)
MCHC RBC AUTO-ENTMCNC: 31.7 G/DL (ref 32–36)
MCV RBC AUTO: 90 FL (ref 82–98)
MONOCYTES # BLD AUTO: 0.4 K/UL (ref 0.3–1)
MONOCYTES NFR BLD: 7.3 % (ref 4–15)
NEUTROPHILS # BLD AUTO: 4.1 K/UL (ref 1.8–7.7)
NEUTROPHILS NFR BLD: 68.8 % (ref 38–73)
NRBC BLD-RTO: 0 /100 WBC
PLATELET # BLD AUTO: 168 K/UL (ref 150–350)
PMV BLD AUTO: 10.3 FL (ref 9.2–12.9)
POTASSIUM SERPL-SCNC: 3.9 MMOL/L (ref 3.5–5.1)
PROT SERPL-MCNC: 6 G/DL (ref 6–8.4)
RBC # BLD AUTO: 3.22 M/UL (ref 4–5.4)
SODIUM SERPL-SCNC: 140 MMOL/L (ref 136–145)
WBC # BLD AUTO: 5.92 K/UL (ref 3.9–12.7)

## 2019-07-08 PROCEDURE — 99285 EMERGENCY DEPT VISIT HI MDM: CPT | Mod: 25

## 2019-07-08 PROCEDURE — 96365 THER/PROPH/DIAG IV INF INIT: CPT

## 2019-07-08 PROCEDURE — 80053 COMPREHEN METABOLIC PANEL: CPT

## 2019-07-08 PROCEDURE — 93010 ELECTROCARDIOGRAM REPORT: CPT | Mod: ,,, | Performed by: INTERNAL MEDICINE

## 2019-07-08 PROCEDURE — 93010 EKG 12-LEAD: ICD-10-PCS | Mod: ,,, | Performed by: INTERNAL MEDICINE

## 2019-07-08 PROCEDURE — 85025 COMPLETE CBC W/AUTO DIFF WBC: CPT

## 2019-07-08 PROCEDURE — 25000003 PHARM REV CODE 250: Performed by: INTERNAL MEDICINE

## 2019-07-08 PROCEDURE — 96376 TX/PRO/DX INJ SAME DRUG ADON: CPT

## 2019-07-08 PROCEDURE — C9113 INJ PANTOPRAZOLE SODIUM, VIA: HCPCS | Performed by: INTERNAL MEDICINE

## 2019-07-08 PROCEDURE — 93005 ELECTROCARDIOGRAM TRACING: CPT

## 2019-07-08 PROCEDURE — 63600175 PHARM REV CODE 636 W HCPCS: Performed by: INTERNAL MEDICINE

## 2019-07-08 PROCEDURE — 83690 ASSAY OF LIPASE: CPT

## 2019-07-08 PROCEDURE — 36415 COLL VENOUS BLD VENIPUNCTURE: CPT

## 2019-07-08 PROCEDURE — 96375 TX/PRO/DX INJ NEW DRUG ADDON: CPT

## 2019-07-08 RX ORDER — ONDANSETRON 2 MG/ML
4 INJECTION INTRAMUSCULAR; INTRAVENOUS
Status: COMPLETED | OUTPATIENT
Start: 2019-07-08 | End: 2019-07-08

## 2019-07-08 RX ORDER — CLONIDINE HYDROCHLORIDE 0.1 MG/1
0.1 TABLET ORAL
Status: COMPLETED | OUTPATIENT
Start: 2019-07-08 | End: 2019-07-08

## 2019-07-08 RX ADMIN — CLONIDINE HYDROCHLORIDE 0.1 MG: 0.1 TABLET ORAL at 05:07

## 2019-07-08 RX ADMIN — ONDANSETRON 4 MG: 2 INJECTION INTRAMUSCULAR; INTRAVENOUS at 04:07

## 2019-07-08 RX ADMIN — DEXTROSE 8 MG/HR: 50 INJECTION, SOLUTION INTRAVENOUS at 04:07

## 2019-07-08 RX ADMIN — ONDANSETRON 4 MG: 2 INJECTION INTRAMUSCULAR; INTRAVENOUS at 03:07

## 2019-07-08 NOTE — ED NOTES
Patient transferred to [facility] per [facility]. Protonix drip continued en route.

## 2019-07-08 NOTE — ED NOTES
Rounding performed on patient. Awake, alert. Pt denies any pain. Reports nausea continues even after Zofran IV. Dr. Munoz notified, awaiting orders.   Pt with emesis bag. Friends present at bedside.

## 2019-07-08 NOTE — ED PROVIDER NOTES
"Encounter Date: 7/8/2019       History     Chief Complaint   Patient presents with    Hematemesis     reports black tarry vomit x1     Pt states that she felt nauseated tonight and vomited once some "dark, tarry vomit." She has also felt some LLQ pain (mild ) for the past 2 days. She came in tonight because she says that she has had "5 obstructions in her intestines" and she was concerned that it might be happening again. In addition to fibromyalgia, the patient has chronic constipation for years. She says that it is no worse recently and her last BM was today. She has been able to pass gas without difficulty. She has not had fever or chills. She has not had any melena, hematochezia, or weakness.        Review of patient's allergies indicates:   Allergen Reactions    Cymbalta [duloxetine] Nausea And Vomiting    Lyrica  [pregabalin]      Other reaction(s): Vomiting    Ropinirole Nausea And Vomiting    Statins-hmg-coa reductase inhibitors      Other reaction(s): Muscle pain    Sulfa (sulfonamide antibiotics) Rash     Past Medical History:   Diagnosis Date    Arthritis     Bowel obstruction 4/6/214    [facility]     Diverticulitis     Fibromyalgia     Hyperlipidemia     Hypertension     Pinched nerve     x 2    Sciatica      Past Surgical History:   Procedure Laterality Date    APPENDECTOMY      BACK SURGERY      lumbar     BREAST SURGERY      lump removed    EXTRACTION, CATARACT, WITH IOL INSERTION Right 4/11/2019    [facility]    EXTRACTION, CATARACT, WITH IOL INSERTION Left 2/14/2019    [facility]    HYSTERECTOMY      intestinal obstruction  11/2012    intestinal obstruction  4/6/2014    PHACOEMULSIFICATION, CATARACT Right 4/11/2019    [facility]    PHACOEMULSIFICATION, CATARACT Left 2/14/2019    [facility]    SMALL INTESTINE SURGERY   "    vocal cord injections       Family History   Problem Relation Age of Onset    Cancer Mother         renal    Tuberculosis Father      Social History     Tobacco Use    Smoking status: Never Smoker    Smokeless tobacco: Never Used   Substance Use Topics    Alcohol use: No    Drug use: No     Review of Systems   All other systems reviewed and are negative.      Physical Exam     Initial Vitals [07/08/19 0221]   BP Pulse Resp Temp SpO2   (!) 189/80 93 12 98.5 °F (36.9 °C) 97 %      MAP       --         Physical Exam    Nursing note and vitals reviewed.  Constitutional: She appears well-developed and well-nourished.   HENT:   Head: Normocephalic.   Nose: Nose normal.   Mouth/Throat: Oropharynx is clear and moist.   Eyes: Conjunctivae and EOM are normal.   Neck: Normal range of motion. Neck supple.   Cardiovascular: Normal rate, regular rhythm, normal heart sounds and intact distal pulses. Exam reveals no gallop and no friction rub.    No murmur heard.  Pulmonary/Chest: Breath sounds normal.   Abdominal: Soft. Bowel sounds are normal. She exhibits no distension and no mass. There is tenderness. There is no rebound and no guarding.   There is mild tenderness to palpation in the LLQ, just below the level of the umbilicus.   Musculoskeletal: Normal range of motion.   Neurological: She is alert and oriented to person, place, and time. She has normal strength.   Skin: Skin is warm and dry. Capillary refill takes less than 2 seconds.         ED Course   Procedures  Labs Reviewed   CBC W/ AUTO DIFFERENTIAL   COMPREHENSIVE METABOLIC PANEL   LIPASE     EKG Readings: (Independently Interpreted)   Initial Reading: No STEMI.   Normal       Imaging Results    None       X-Rays:   Independently Interpreted Readings:   Other Readings:  Abd Xray- hardware in lumbar spine; feces in colon with many undissolved radioopaque cylindrical items approx 1 cm in length.(meds)    Medical Decision Making:   Initial Assessment:    Coffee-ground emesis by history with fall in Hgb  Differential Diagnosis:   UGI bleed  Multiple SBO by Hx  Clinical Tests:   Lab Tests: Ordered and Reviewed  Radiological Study: Ordered and Reviewed  Medical Tests: Ordered and Reviewed  ED Management:  Pt transferred to [place] (pt's request) for GI evaluation.                      Clinical Impression:       ICD-10-CM ICD-9-CM   1. UGI bleed K92.2 578.9   2. Abdominal pain R10.9 789.00   3. GI bleed K92.2 578.9         Disposition:   Disposition: Transferred  Condition: Stable  Reason for referral: needs GI evaluation                        Ana Munoz MD  07/08/19 0548

## 2019-07-08 NOTE — ED TRIAGE NOTES
86 y.o. female presents to ER ED 04/ED 04   Chief Complaint   Patient presents with    Hematemesis     reports black tarry vomit x1   Patient reports one episode of black tarry vomit around 1 AM. Reports slight nausea at present. No acute distress noted.

## 2019-07-16 ENCOUNTER — LAB VISIT (OUTPATIENT)
Dept: LAB | Facility: HOSPITAL | Age: 84
End: 2019-07-16
Attending: INTERNAL MEDICINE
Payer: MEDICARE

## 2019-07-16 DIAGNOSIS — R10.12 ABDOMINAL PAIN, LEFT UPPER QUADRANT: Primary | ICD-10-CM

## 2019-07-16 DIAGNOSIS — K92.2 UPPER GASTROINTESTINAL BLEEDING: ICD-10-CM

## 2019-07-16 DIAGNOSIS — R13.10 DYSPHAGIA: ICD-10-CM

## 2019-07-16 LAB — FERRITIN SERPL-MCNC: 482 NG/ML (ref 20–300)

## 2019-07-16 PROCEDURE — 36415 COLL VENOUS BLD VENIPUNCTURE: CPT

## 2019-07-16 PROCEDURE — 82728 ASSAY OF FERRITIN: CPT

## 2019-07-16 PROCEDURE — 83540 ASSAY OF IRON: CPT

## 2019-07-17 ENCOUNTER — LAB VISIT (OUTPATIENT)
Dept: LAB | Facility: HOSPITAL | Age: 84
End: 2019-07-17
Attending: INTERNAL MEDICINE
Payer: MEDICARE

## 2019-07-17 DIAGNOSIS — K92.89 GASTROINTESTINAL IRRITATION: Primary | ICD-10-CM

## 2019-07-17 LAB
BASOPHILS NFR BLD: 0 % (ref 0–1.9)
DIFFERENTIAL METHOD: ABNORMAL
EOSINOPHIL NFR BLD: 1 % (ref 0–8)
ERYTHROCYTE [DISTWIDTH] IN BLOOD BY AUTOMATED COUNT: 18 % (ref 11.5–14.5)
HCT VFR BLD AUTO: 28 % (ref 37–48.5)
HGB BLD-MCNC: 8.7 G/DL (ref 12–16)
IMM GRANULOCYTES # BLD AUTO: ABNORMAL K/UL (ref 0–0.04)
IMM GRANULOCYTES NFR BLD AUTO: ABNORMAL % (ref 0–0.5)
IRON SERPL-MCNC: 84 UG/DL (ref 30–160)
LYMPHOCYTES NFR BLD: 31 % (ref 18–48)
MCH RBC QN AUTO: 29.4 PG (ref 27–31)
MCHC RBC AUTO-ENTMCNC: 31.1 G/DL (ref 32–36)
MCV RBC AUTO: 95 FL (ref 82–98)
MONOCYTES NFR BLD: 6 % (ref 4–15)
NEUTROPHILS NFR BLD: 62 % (ref 38–73)
NRBC BLD-RTO: 0 /100 WBC
PLATELET # BLD AUTO: 216 K/UL (ref 150–350)
PMV BLD AUTO: 9.6 FL (ref 9.2–12.9)
RBC # BLD AUTO: 2.96 M/UL (ref 4–5.4)
SATURATED IRON: 21 % (ref 20–50)
TOTAL IRON BINDING CAPACITY: 398 UG/DL (ref 250–450)
TRANSFERRIN SERPL-MCNC: 269 MG/DL (ref 200–375)
WBC # BLD AUTO: 5.84 K/UL (ref 3.9–12.7)

## 2019-07-17 PROCEDURE — 85027 COMPLETE CBC AUTOMATED: CPT

## 2019-07-17 PROCEDURE — 85007 BL SMEAR W/DIFF WBC COUNT: CPT

## 2019-07-17 PROCEDURE — 36415 COLL VENOUS BLD VENIPUNCTURE: CPT

## 2019-07-19 ENCOUNTER — OFFICE VISIT (OUTPATIENT)
Dept: FAMILY MEDICINE | Facility: CLINIC | Age: 84
End: 2019-07-19
Payer: MEDICARE

## 2019-07-19 ENCOUNTER — TELEPHONE (OUTPATIENT)
Dept: FAMILY MEDICINE | Facility: CLINIC | Age: 84
End: 2019-07-19

## 2019-07-19 VITALS
HEART RATE: 76 BPM | SYSTOLIC BLOOD PRESSURE: 164 MMHG | WEIGHT: 132 LBS | DIASTOLIC BLOOD PRESSURE: 52 MMHG | BODY MASS INDEX: 23.39 KG/M2 | HEIGHT: 63 IN | RESPIRATION RATE: 16 BRPM

## 2019-07-19 DIAGNOSIS — G57.90 NEUROPATHY OF LOWER EXTREMITY, UNSPECIFIED LATERALITY: ICD-10-CM

## 2019-07-19 DIAGNOSIS — M79.7 FIBROMYALGIA: ICD-10-CM

## 2019-07-19 DIAGNOSIS — K66.0 ABDOMINAL ADHESIONS: ICD-10-CM

## 2019-07-19 DIAGNOSIS — G89.29 CHRONIC PAIN: ICD-10-CM

## 2019-07-19 DIAGNOSIS — K29.01 OTHER ACUTE GASTRITIS WITH HEMORRHAGE: ICD-10-CM

## 2019-07-19 DIAGNOSIS — M79.604 BILATERAL LEG PAIN: ICD-10-CM

## 2019-07-19 DIAGNOSIS — G89.4 CHRONIC PAIN SYNDROME: ICD-10-CM

## 2019-07-19 DIAGNOSIS — M79.605 BILATERAL LEG PAIN: ICD-10-CM

## 2019-07-19 DIAGNOSIS — E03.4 HYPOTHYROIDISM DUE TO ACQUIRED ATROPHY OF THYROID: ICD-10-CM

## 2019-07-19 PROBLEM — K29.61 OTHER GASTRITIS WITH BLEEDING: Status: ACTIVE | Noted: 2019-07-19

## 2019-07-19 PROCEDURE — 99999 PR PBB SHADOW E&M-EST. PATIENT-LVL III: CPT | Mod: PBBFAC,,, | Performed by: FAMILY MEDICINE

## 2019-07-19 PROCEDURE — 1101F PT FALLS ASSESS-DOCD LE1/YR: CPT | Mod: CPTII,S$GLB,, | Performed by: FAMILY MEDICINE

## 2019-07-19 PROCEDURE — 99999 PR PBB SHADOW E&M-EST. PATIENT-LVL III: ICD-10-PCS | Mod: PBBFAC,,, | Performed by: FAMILY MEDICINE

## 2019-07-19 PROCEDURE — 99213 PR OFFICE/OUTPT VISIT, EST, LEVL III, 20-29 MIN: ICD-10-PCS | Mod: S$GLB,,, | Performed by: FAMILY MEDICINE

## 2019-07-19 PROCEDURE — 99213 OFFICE O/P EST LOW 20 MIN: CPT | Mod: S$GLB,,, | Performed by: FAMILY MEDICINE

## 2019-07-19 PROCEDURE — 1101F PR PT FALLS ASSESS DOC 0-1 FALLS W/OUT INJ PAST YR: ICD-10-PCS | Mod: CPTII,S$GLB,, | Performed by: FAMILY MEDICINE

## 2019-07-19 RX ORDER — AMITRIPTYLINE HYDROCHLORIDE 25 MG/1
25 TABLET, FILM COATED ORAL NIGHTLY
Qty: 30 TABLET | Refills: 5 | Status: SHIPPED | OUTPATIENT
Start: 2019-07-19 | End: 2020-08-04

## 2019-07-19 RX ORDER — LEVOTHYROXINE SODIUM 75 UG/1
TABLET ORAL
Qty: 90 TABLET | Refills: 3 | Status: ON HOLD | OUTPATIENT
Start: 2019-07-19 | End: 2020-06-06 | Stop reason: HOSPADM

## 2019-07-19 RX ORDER — OMEPRAZOLE 20 MG/1
20 CAPSULE, DELAYED RELEASE ORAL DAILY
Qty: 30 CAPSULE | Refills: 11 | Status: SHIPPED | OUTPATIENT
Start: 2019-07-19 | End: 2021-06-28 | Stop reason: SDUPTHER

## 2019-07-19 RX ORDER — SUCRALFATE 1 G/1
TABLET ORAL
Qty: 120 TABLET | Refills: 5 | Status: SHIPPED | OUTPATIENT
Start: 2019-07-19 | End: 2021-06-28 | Stop reason: SDUPTHER

## 2019-07-19 RX ORDER — DICLOFENAC SODIUM 10 MG/G
GEL TOPICAL
Qty: 5 TUBE | Refills: 11 | Status: SHIPPED | OUTPATIENT
Start: 2019-07-19 | End: 2020-04-27

## 2019-07-19 RX ORDER — PANTOPRAZOLE SODIUM 40 MG/1
40 TABLET, DELAYED RELEASE ORAL DAILY
Refills: 0 | COMMUNITY
Start: 2019-07-14 | End: 2021-06-28 | Stop reason: SDUPTHER

## 2019-07-19 RX ORDER — CLONIDINE HYDROCHLORIDE 0.1 MG/1
0.1 TABLET ORAL NIGHTLY
Qty: 30 TABLET | Refills: 3 | Status: SHIPPED | OUTPATIENT
Start: 2019-07-19 | End: 2020-07-16 | Stop reason: SDUPTHER

## 2019-07-19 RX ORDER — ERGOCALCIFEROL 1.25 MG/1
50000 CAPSULE ORAL
Qty: 24 CAPSULE | Refills: 1 | Status: SHIPPED | OUTPATIENT
Start: 2019-07-19 | End: 2021-07-26 | Stop reason: SDUPTHER

## 2019-07-19 RX ORDER — DILTIAZEM HYDROCHLORIDE 240 MG/1
CAPSULE, COATED, EXTENDED RELEASE ORAL
Qty: 90 CAPSULE | Refills: 3 | Status: SHIPPED | OUTPATIENT
Start: 2019-07-19 | End: 2020-07-16 | Stop reason: SDUPTHER

## 2019-07-19 RX ORDER — GABAPENTIN 300 MG/1
CAPSULE ORAL
Qty: 210 CAPSULE | Refills: 3 | Status: SHIPPED | OUTPATIENT
Start: 2019-07-19 | End: 2020-07-16 | Stop reason: SDUPTHER

## 2019-07-19 RX ORDER — HYDROCODONE BITARTRATE AND ACETAMINOPHEN 10; 325 MG/1; MG/1
1 TABLET ORAL EVERY 6 HOURS PRN
Qty: 120 TABLET | Refills: 0 | Status: SHIPPED | OUTPATIENT
Start: 2019-07-19 | End: 2019-08-01 | Stop reason: SDUPTHER

## 2019-07-19 RX ORDER — ONDANSETRON 4 MG/1
4 TABLET, FILM COATED ORAL EVERY 6 HOURS PRN
Qty: 10 TABLET | Refills: 1 | Status: SHIPPED | OUTPATIENT
Start: 2019-07-19

## 2019-07-19 NOTE — TELEPHONE ENCOUNTER
----- Message from Kathy Coles sent at 2019  3:45 PM CDT -----  Contact: [facility] pharmacy-stephany Leach  MRN: [id number]  : 7/3/1933  PCP: Tomasz Lynch  Home Phone      [phone]  Work Phone      Not on file.  [employer]          [phone]      MESSAGE:   Stephany lyn to know How many gram to do 4 times a day for diclofenac sodium (VOLTAREN) 1 % Gel      [phone]  Stephany

## 2019-07-19 NOTE — PROGRESS NOTES
Subjective:       Patient ID: Cheyenne Leach is a 86 y.o. female.    Chief Complaint: Pain    Pt is a 86 y.o. female who presents for check up for Bilateral leg pain and GI bleeding and intense pain chronic pain syndrome  Fibromyalgia  Neuropathy of lower extremity, unspecified laterality  Hypothyroidism due to acquired atrophy of thyroid  Abdominal adhesions  Chronic pain. Doing well on current meds. Denies any side effects. Prevention is up to date.    Review of Systems   Constitutional: Negative for appetite change, chills and fever.   HENT: Negative for rhinorrhea, sinus pressure, sore throat and trouble swallowing.    Respiratory: Negative for cough, chest tightness, shortness of breath and wheezing.    Cardiovascular: Negative for chest pain and palpitations.   Gastrointestinal: Positive for abdominal pain. Negative for blood in stool, diarrhea, nausea and vomiting.        Having black tarry stools with some 8/10 abdominal pains   Genitourinary: Negative for dysuria, flank pain, hematuria, pelvic pain, urgency, vaginal bleeding, vaginal discharge and vaginal pain.   Musculoskeletal: Negative for back pain, joint swelling and neck stiffness.   Skin: Negative for rash.   Neurological: Negative for dizziness, weakness, light-headedness, numbness and headaches.   Hematological: Does not bruise/bleed easily.   Psychiatric/Behavioral: Negative for agitation. The patient is not nervous/anxious.        Objective:      Physical Exam   Constitutional: She is oriented to person, place, and time. She appears well-developed and well-nourished.   HENT:   Head: Normocephalic.   Eyes: Pupils are equal, round, and reactive to light.   Neck: Normal range of motion. Neck supple. No thyromegaly present.   Cardiovascular: Normal rate and regular rhythm.   Pulmonary/Chest: No respiratory distress. She has no wheezes. She has no rales. She exhibits no tenderness.   Abdominal: She exhibits distension. There is tenderness. There is  no rebound and no guarding.   BS++ w/o guarding   Musculoskeletal: Normal range of motion. She exhibits no edema or tenderness.   Lymphadenopathy:     She has no cervical adenopathy.   Neurological: She is alert and oriented to person, place, and time. She has normal reflexes. She displays normal reflexes. No cranial nerve deficit. She exhibits normal muscle tone. Coordination normal.   Skin: Skin is warm and dry. No rash noted. No pallor.   Psychiatric: She has a normal mood and affect. Judgment and thought content normal.       Assessment:       1. Bilateral leg pain    2. Chronic pain syndrome    3. Fibromyalgia    4. Neuropathy of lower extremity, unspecified laterality    5. Hypothyroidism due to acquired atrophy of thyroid    6. Abdominal adhesions    7. Chronic pain        Plan:   Cheyenne was seen today for pain.    Diagnoses and all orders for this visit:    Bilateral leg pain  -     diclofenac sodium (VOLTAREN) 1 % Gel; APPLY   TOPICALLY 4 TIMES DAILY AS NEEDED  -     ergocalciferol (VITAMIN D2) 50,000 unit Cap; Take 1 capsule (50,000 Units total) by mouth every 7 days.    Chronic pain syndrome  -     diclofenac sodium (VOLTAREN) 1 % Gel; APPLY   TOPICALLY 4 TIMES DAILY AS NEEDED    Fibromyalgia  -     diclofenac sodium (VOLTAREN) 1 % Gel; APPLY   TOPICALLY 4 TIMES DAILY AS NEEDED  -     ergocalciferol (VITAMIN D2) 50,000 unit Cap; Take 1 capsule (50,000 Units total) by mouth every 7 days.    Neuropathy of lower extremity, unspecified laterality  -     ergocalciferol (VITAMIN D2) 50,000 unit Cap; Take 1 capsule (50,000 Units total) by mouth every 7 days.    Hypothyroidism due to acquired atrophy of thyroid  -     levothyroxine (SYNTHROID) 75 MCG tablet; TAKE 1 TABLET (75 MCG TOTAL) BY MOUTH ONCE DAILY.    Abdominal adhesions  -     omeprazole (PRILOSEC) 20 MG capsule; Take 1 capsule (20 mg total) by mouth once daily. 1 Capsule(s) Oral PRN Every day.    Chronic pain  -     ondansetron (ZOFRAN) 4 MG tablet;  Take 1 tablet (4 mg total) by mouth every 6 (six) hours as needed.    Other orders  -     amitriptyline (ELAVIL) 25 MG tablet; Take 1 tablet (25 mg total) by mouth every evening.  -     cloNIDine (CATAPRES) 0.1 MG tablet; Take 1 tablet (0.1 mg total) by mouth every evening.  -     diltiaZEM (CARDIZEM CD) 240 MG 24 hr capsule; One po nightly for HTN  -     gabapentin (NEURONTIN) 300 MG capsule; TAKE 2 CAPSULE BY MOUTH BREAKFAST AND LUNCH AND 3 AT BEDTIME  -     HYDROcodone-acetaminophen (NORCO)  mg per tablet; Take 1 tablet by mouth every 6 (six) hours as needed.    Stop Voltaren Gel

## 2019-07-19 NOTE — TELEPHONE ENCOUNTER
Pharmacy notified 4 grams Voltaren Gel. Diagnosis code for pain meds g89.29 Chronic pain.  Verbal understanding.

## 2019-07-25 ENCOUNTER — HOSPITAL ENCOUNTER (OUTPATIENT)
Dept: RADIOLOGY | Facility: HOSPITAL | Age: 84
Discharge: HOME | End: 2019-07-25
Attending: INTERNAL MEDICINE
Payer: MEDICARE

## 2019-07-25 DIAGNOSIS — K44.9 HIATAL HERNIA: ICD-10-CM

## 2019-07-25 DIAGNOSIS — R68.89 ABNORMAL FINDING: ICD-10-CM

## 2019-07-25 PROCEDURE — 74245 FL UPPER GI WITH SMALL BOWEL: CPT | Mod: TC

## 2019-07-25 PROCEDURE — 74245 FL UPPER GI WITH SMALL BOWEL: CPT | Mod: 26,,, | Performed by: RADIOLOGY

## 2019-07-25 PROCEDURE — 74245 FL UPPER GI WITH SMALL BOWEL: ICD-10-PCS | Mod: 26,,, | Performed by: RADIOLOGY

## 2019-08-01 ENCOUNTER — OFFICE VISIT (OUTPATIENT)
Dept: FAMILY MEDICINE | Facility: CLINIC | Age: 84
End: 2019-08-01
Payer: MEDICARE

## 2019-08-01 VITALS
RESPIRATION RATE: 16 BRPM | HEART RATE: 82 BPM | HEIGHT: 59 IN | SYSTOLIC BLOOD PRESSURE: 126 MMHG | WEIGHT: 132 LBS | BODY MASS INDEX: 26.61 KG/M2 | DIASTOLIC BLOOD PRESSURE: 72 MMHG

## 2019-08-01 DIAGNOSIS — I10 ESSENTIAL HYPERTENSION: Primary | ICD-10-CM

## 2019-08-01 DIAGNOSIS — K92.1 MELENA: ICD-10-CM

## 2019-08-01 DIAGNOSIS — M47.816 OSTEOARTHRITIS OF LUMBAR SPINE, UNSPECIFIED SPINAL OSTEOARTHRITIS COMPLICATION STATUS: ICD-10-CM

## 2019-08-01 PROCEDURE — 1101F PT FALLS ASSESS-DOCD LE1/YR: CPT | Mod: CPTII,S$GLB,, | Performed by: FAMILY MEDICINE

## 2019-08-01 PROCEDURE — 99999 PR PBB SHADOW E&M-EST. PATIENT-LVL III: ICD-10-PCS | Mod: PBBFAC,,, | Performed by: FAMILY MEDICINE

## 2019-08-01 PROCEDURE — 99213 OFFICE O/P EST LOW 20 MIN: CPT | Mod: S$GLB,,, | Performed by: FAMILY MEDICINE

## 2019-08-01 PROCEDURE — 1101F PR PT FALLS ASSESS DOC 0-1 FALLS W/OUT INJ PAST YR: ICD-10-PCS | Mod: CPTII,S$GLB,, | Performed by: FAMILY MEDICINE

## 2019-08-01 PROCEDURE — 99213 PR OFFICE/OUTPT VISIT, EST, LEVL III, 20-29 MIN: ICD-10-PCS | Mod: S$GLB,,, | Performed by: FAMILY MEDICINE

## 2019-08-01 PROCEDURE — 99999 PR PBB SHADOW E&M-EST. PATIENT-LVL III: CPT | Mod: PBBFAC,,, | Performed by: FAMILY MEDICINE

## 2019-08-01 RX ORDER — HYDROCODONE BITARTRATE AND ACETAMINOPHEN 10; 325 MG/1; MG/1
1 TABLET ORAL EVERY 6 HOURS PRN
Qty: 120 TABLET | Refills: 0 | Status: SHIPPED | OUTPATIENT
Start: 2019-08-19 | End: 2019-08-26 | Stop reason: SDUPTHER

## 2019-08-01 RX ORDER — HYDROCODONE BITARTRATE AND ACETAMINOPHEN 10; 325 MG/1; MG/1
1 TABLET ORAL EVERY 6 HOURS PRN
Qty: 60 TABLET | Refills: 0 | Status: SHIPPED | OUTPATIENT
Start: 2019-09-19 | End: 2019-09-12 | Stop reason: SDUPTHER

## 2019-08-01 NOTE — PROGRESS NOTES
Subjective:       Patient ID: Cheyenne Leach is a 86 y.o. female.    Chief Complaint: Follow-up (2 week check up)    Pt is a 86 y.o. female who presents for check up for F U with dark stools. Doing well on current meds. Denies any side effects. Prevention is up to date.    Review of Systems   Constitutional: Negative for appetite change, chills and fever.   HENT: Negative for rhinorrhea, sore throat and trouble swallowing. Sinus pressure: having more efficent BMs with the miralax and stools are yellow now.    Respiratory: Negative for cough, chest tightness, shortness of breath and wheezing.    Cardiovascular: Negative for chest pain and palpitations.   Gastrointestinal: Negative for blood in stool, diarrhea, nausea and vomiting.        Having reg BMs and yellow stools   Genitourinary: Negative for dysuria, flank pain, hematuria, pelvic pain, urgency, vaginal bleeding, vaginal discharge and vaginal pain.   Musculoskeletal: Negative for back pain, joint swelling and neck stiffness.   Skin: Negative for rash.   Neurological: Negative for dizziness, weakness, light-headedness, numbness and headaches.   Hematological: Does not bruise/bleed easily.   Psychiatric/Behavioral: Negative for agitation. The patient is not nervous/anxious.        Objective:      Physical Exam   Constitutional: She is oriented to person, place, and time. She appears well-developed and well-nourished.   HENT:   Head: Normocephalic.   Eyes: Pupils are equal, round, and reactive to light.   Neck: Normal range of motion. Neck supple. No thyromegaly present.   Cardiovascular: Normal rate and regular rhythm.   Pulmonary/Chest: No respiratory distress. She has no wheezes. She has no rales. She exhibits no tenderness.   Abdominal: She exhibits no distension. There is no tenderness. There is no rebound and no guarding.   Musculoskeletal: Normal range of motion. She exhibits no edema or tenderness.   Lymphadenopathy:     She has no cervical adenopathy.    Neurological: She is alert and oriented to person, place, and time. She has normal reflexes. She displays normal reflexes. No cranial nerve deficit. She exhibits normal muscle tone. Coordination normal.   Skin: Skin is warm and dry. No rash noted. No pallor.   Psychiatric: She has a normal mood and affect. Judgment and thought content normal.       Assessment:       1. Essential hypertension    2. Osteoarthritis of lumbar spine, unspecified spinal osteoarthritis complication status    3. Melena        Plan:   Cheyenne was seen today for follow-up.    Diagnoses and all orders for this visit:    Essential hypertension    Osteoarthritis of lumbar spine, unspecified spinal osteoarthritis complication status    Melena

## 2019-08-19 ENCOUNTER — LAB VISIT (OUTPATIENT)
Dept: LAB | Facility: HOSPITAL | Age: 84
End: 2019-08-19
Attending: INTERNAL MEDICINE
Payer: MEDICARE

## 2019-08-19 DIAGNOSIS — K92.2 GASTROINTESTINAL HEMORRHAGE: Primary | ICD-10-CM

## 2019-08-19 LAB
ANISOCYTOSIS BLD QL SMEAR: SLIGHT
BASOPHILS # BLD AUTO: ABNORMAL K/UL (ref 0–0.2)
BASOPHILS NFR BLD: 0 % (ref 0–1.9)
DIFFERENTIAL METHOD: ABNORMAL
EOSINOPHIL # BLD AUTO: ABNORMAL K/UL (ref 0–0.5)
EOSINOPHIL NFR BLD: 1 % (ref 0–8)
ERYTHROCYTE [DISTWIDTH] IN BLOOD BY AUTOMATED COUNT: 16.4 % (ref 11.5–14.5)
FERRITIN SERPL-MCNC: 93 NG/ML (ref 20–300)
HCT VFR BLD AUTO: 34.1 % (ref 37–48.5)
HGB BLD-MCNC: 10.5 G/DL (ref 12–16)
IMM GRANULOCYTES # BLD AUTO: ABNORMAL K/UL (ref 0–0.04)
IMM GRANULOCYTES NFR BLD AUTO: ABNORMAL % (ref 0–0.5)
IRON SERPL-MCNC: 59 UG/DL (ref 30–160)
LYMPHOCYTES # BLD AUTO: ABNORMAL K/UL (ref 1–4.8)
LYMPHOCYTES NFR BLD: 35 % (ref 18–48)
MCH RBC QN AUTO: 30.4 PG (ref 27–31)
MCHC RBC AUTO-ENTMCNC: 30.8 G/DL (ref 32–36)
MCV RBC AUTO: 99 FL (ref 82–98)
MONOCYTES # BLD AUTO: ABNORMAL K/UL (ref 0.3–1)
MONOCYTES NFR BLD: 11 % (ref 4–15)
NEUTROPHILS NFR BLD: 52 % (ref 38–73)
NEUTS BAND NFR BLD MANUAL: 1 %
NRBC BLD-RTO: 0 /100 WBC
PLATELET # BLD AUTO: 177 K/UL (ref 150–350)
PLATELET BLD QL SMEAR: ABNORMAL
PMV BLD AUTO: 9.7 FL (ref 9.2–12.9)
RBC # BLD AUTO: 3.45 M/UL (ref 4–5.4)
SATURATED IRON: 16 % (ref 20–50)
TOTAL IRON BINDING CAPACITY: 367 UG/DL (ref 250–450)
TRANSFERRIN SERPL-MCNC: 248 MG/DL (ref 200–375)
WBC # BLD AUTO: 5.13 K/UL (ref 3.9–12.7)

## 2019-08-19 PROCEDURE — 85007 BL SMEAR W/DIFF WBC COUNT: CPT

## 2019-08-19 PROCEDURE — 85027 COMPLETE CBC AUTOMATED: CPT

## 2019-08-19 PROCEDURE — 82728 ASSAY OF FERRITIN: CPT

## 2019-08-19 PROCEDURE — 83540 ASSAY OF IRON: CPT

## 2019-08-19 PROCEDURE — 36415 COLL VENOUS BLD VENIPUNCTURE: CPT

## 2019-08-26 ENCOUNTER — TELEPHONE (OUTPATIENT)
Dept: INTERNAL MEDICINE | Facility: CLINIC | Age: 84
End: 2019-08-26

## 2019-08-26 DIAGNOSIS — G89.29 OTHER CHRONIC PAIN: Primary | ICD-10-CM

## 2019-08-26 RX ORDER — HYDROCODONE BITARTRATE AND ACETAMINOPHEN 10; 325 MG/1; MG/1
1 TABLET ORAL EVERY 6 HOURS PRN
Qty: 120 TABLET | Refills: 0 | Status: SHIPPED | OUTPATIENT
Start: 2019-08-26 | End: 2019-11-27 | Stop reason: SDUPTHER

## 2019-08-26 NOTE — TELEPHONE ENCOUNTER
----- Message from George Rao sent at 2019  8:31 AM CDT -----  [facility]  Cheyenne Leach  MRN: [id number]  : 7/3/1933  PCP: Tomasz Lynch  Home Phone      [phone]  Work Phone      Not on file.  Mobile          [phone]      MESSAGE: patient presented Rx for Norco -- returned to patient -- must state medically necessary on Rx for more than 7 days -- will need new Rx    Call Chica @ [phone]    PCP: Syed

## 2019-09-12 ENCOUNTER — OFFICE VISIT (OUTPATIENT)
Dept: FAMILY MEDICINE | Facility: CLINIC | Age: 84
End: 2019-09-12
Payer: MEDICARE

## 2019-09-12 VITALS
HEART RATE: 80 BPM | HEIGHT: 59 IN | SYSTOLIC BLOOD PRESSURE: 118 MMHG | RESPIRATION RATE: 16 BRPM | BODY MASS INDEX: 26.41 KG/M2 | WEIGHT: 131 LBS | DIASTOLIC BLOOD PRESSURE: 60 MMHG

## 2019-09-12 DIAGNOSIS — I10 ESSENTIAL HYPERTENSION: ICD-10-CM

## 2019-09-12 DIAGNOSIS — Z28.39 IMMUNIZATION DEFICIENCY: Primary | ICD-10-CM

## 2019-09-12 DIAGNOSIS — M79.605 BILATERAL LEG PAIN: ICD-10-CM

## 2019-09-12 DIAGNOSIS — M79.7 FIBROMYALGIA: ICD-10-CM

## 2019-09-12 DIAGNOSIS — M79.604 BILATERAL LEG PAIN: ICD-10-CM

## 2019-09-12 DIAGNOSIS — G89.4 CHRONIC PAIN SYNDROME: ICD-10-CM

## 2019-09-12 PROCEDURE — 1101F PT FALLS ASSESS-DOCD LE1/YR: CPT | Mod: CPTII,S$GLB,, | Performed by: FAMILY MEDICINE

## 2019-09-12 PROCEDURE — 99999 PR PBB SHADOW E&M-EST. PATIENT-LVL IV: ICD-10-PCS | Mod: PBBFAC,,, | Performed by: FAMILY MEDICINE

## 2019-09-12 PROCEDURE — 90732 PPSV23 VACC 2 YRS+ SUBQ/IM: CPT | Mod: S$GLB,,, | Performed by: FAMILY MEDICINE

## 2019-09-12 PROCEDURE — 99213 OFFICE O/P EST LOW 20 MIN: CPT | Mod: 25,S$GLB,, | Performed by: FAMILY MEDICINE

## 2019-09-12 PROCEDURE — 90732 PNEUMOCOCCAL POLYSACCHARIDE VACCINE 23-VALENT =>2YO SQ IM: ICD-10-PCS | Mod: S$GLB,,, | Performed by: FAMILY MEDICINE

## 2019-09-12 PROCEDURE — 99213 PR OFFICE/OUTPT VISIT, EST, LEVL III, 20-29 MIN: ICD-10-PCS | Mod: 25,S$GLB,, | Performed by: FAMILY MEDICINE

## 2019-09-12 PROCEDURE — G0009 ADMIN PNEUMOCOCCAL VACCINE: HCPCS | Mod: 59,S$GLB,, | Performed by: FAMILY MEDICINE

## 2019-09-12 PROCEDURE — G0009 PNEUMOCOCCAL POLYSACCHARIDE VACCINE 23-VALENT =>2YO SQ IM: ICD-10-PCS | Mod: 59,S$GLB,, | Performed by: FAMILY MEDICINE

## 2019-09-12 PROCEDURE — 99999 PR PBB SHADOW E&M-EST. PATIENT-LVL IV: CPT | Mod: PBBFAC,,, | Performed by: FAMILY MEDICINE

## 2019-09-12 PROCEDURE — 96372 PR INJECTION,THERAP/PROPH/DIAG2ST, IM OR SUBCUT: ICD-10-PCS | Mod: S$GLB,,, | Performed by: FAMILY MEDICINE

## 2019-09-12 PROCEDURE — 96372 THER/PROPH/DIAG INJ SC/IM: CPT | Mod: S$GLB,,, | Performed by: FAMILY MEDICINE

## 2019-09-12 PROCEDURE — 1101F PR PT FALLS ASSESS DOC 0-1 FALLS W/OUT INJ PAST YR: ICD-10-PCS | Mod: CPTII,S$GLB,, | Performed by: FAMILY MEDICINE

## 2019-09-12 RX ORDER — HYDROCODONE BITARTRATE AND ACETAMINOPHEN 10; 325 MG/1; MG/1
1 TABLET ORAL EVERY 6 HOURS PRN
Qty: 120 TABLET | Refills: 0 | Status: SHIPPED | OUTPATIENT
Start: 2019-09-19 | End: 2019-10-23 | Stop reason: SDUPTHER

## 2019-09-12 RX ORDER — KETOROLAC TROMETHAMINE 30 MG/ML
30 INJECTION, SOLUTION INTRAMUSCULAR; INTRAVENOUS
Status: COMPLETED | OUTPATIENT
Start: 2019-09-12 | End: 2019-09-12

## 2019-09-12 RX ADMIN — KETOROLAC TROMETHAMINE 30 MG: 30 INJECTION, SOLUTION INTRAMUSCULAR; INTRAVENOUS at 10:09

## 2019-09-12 NOTE — PROGRESS NOTES
Subjective:       Patient ID: Cheyenne Leach is a 86 y.o. female.    Chief Complaint: Follow-up    Pt is a 86 y.o. female who presents for check up for Immunization deficiency  (primary encounter diagnosis). Doing well on current meds. Denies any side effects. Prevention is up to date.    Review of Systems   Constitutional: Negative for appetite change, chills and fever.   HENT: Negative for rhinorrhea, sinus pressure, sore throat and trouble swallowing.    Respiratory: Negative for cough, chest tightness, shortness of breath and wheezing.    Cardiovascular: Negative for chest pain and palpitations.   Gastrointestinal: Negative for abdominal pain, blood in stool, diarrhea, nausea and vomiting.        Having good BMs   Genitourinary: Negative for dysuria, flank pain, hematuria, pelvic pain, urgency, vaginal bleeding, vaginal discharge and vaginal pain.   Musculoskeletal: Positive for back pain. Negative for joint swelling and neck stiffness.   Skin: Negative for rash.   Neurological: Negative for dizziness, weakness, light-headedness, numbness and headaches.   Hematological: Does not bruise/bleed easily.   Psychiatric/Behavioral: Negative for agitation. The patient is not nervous/anxious.        Objective:      Physical Exam   Constitutional: She is oriented to person, place, and time. She appears well-developed and well-nourished.   Stiff 85 y/o W F   HENT:   Head: Normocephalic.   Eyes: Pupils are equal, round, and reactive to light.   Neck: Normal range of motion. Neck supple. No thyromegaly present.   Cardiovascular: Normal rate and regular rhythm.   Pulmonary/Chest: No respiratory distress. She has no wheezes. She has no rales. She exhibits no tenderness.   Abdominal: She exhibits no distension. There is no tenderness. There is no rebound and no guarding.   Musculoskeletal: Normal range of motion. She exhibits no edema or tenderness.   Lymphadenopathy:     She has no cervical adenopathy.   Neurological: She is  alert and oriented to person, place, and time. She has normal reflexes. She displays normal reflexes. No cranial nerve deficit. She exhibits normal muscle tone. Coordination normal.   Skin: Skin is warm and dry. No rash noted. No pallor.   Psychiatric: She has a normal mood and affect. Judgment and thought content normal.       Assessment:       1. Immunization deficiency    2. Essential hypertension    3. Fibromyalgia    4. Bilateral leg pain    5. Chronic pain syndrome        Plan:   Cheyenne was seen today for follow-up.    Diagnoses and all orders for this visit:    Immunization deficiency  -     Pneumococcal Polysaccharide Vaccine (23 Valent) (SQ/IM)    Essential hypertension    Fibromyalgia    Bilateral leg pain    Chronic pain syndrome    Other orders  -     HYDROcodone-acetaminophen (NORCO)  mg per tablet; Take 1 tablet by mouth every 6 (six) hours as needed. Opioid is medically necessary; 7 day exempt, for chronic pain

## 2019-10-16 ENCOUNTER — PES CALL (OUTPATIENT)
Dept: ADMINISTRATIVE | Facility: CLINIC | Age: 84
End: 2019-10-16

## 2019-10-21 NOTE — TELEPHONE ENCOUNTER
----- Message from George Rao sent at 10/21/2019 11:35 AM CDT -----  Contact: Patient  Cheyenne Leach  MRN: [id number]  : 7/3/1933  PCP: Tomasz Lynch  Home Phone      [phone]  Work Phone      Not on file.  Mobile          [phone]      MESSAGE: requesting appt sooner than next available with Dr Lynch for med checkup -- will run out of meds    Call [phone]    PCP: Syed

## 2019-10-23 RX ORDER — HYDROCODONE BITARTRATE AND ACETAMINOPHEN 10; 325 MG/1; MG/1
1 TABLET ORAL EVERY 6 HOURS PRN
Qty: 120 TABLET | Refills: 0 | Status: SHIPPED | OUTPATIENT
Start: 2019-10-23 | End: 2019-12-12 | Stop reason: SDUPTHER

## 2019-10-23 NOTE — TELEPHONE ENCOUNTER
----- Message from Anna Heredia sent at 10/23/2019  9:50 AM CDT -----  Contact: Self  Cheyenne Leach  MRN: [id number]  : 7/3/1933  PCP: Tomasz Lynch  Home Phone      [phone]  Work Phone      Not on file.  Mobile          [phone]      MESSAGE:   Patient is calling for the status of the last message, states she never received a call back.    Phone:  [phone]

## 2019-10-23 NOTE — TELEPHONE ENCOUNTER
LOV: 9/12/2019  Upcoming visit: 12/12/2019    Spoke with Shannan and she stated that she will be out of her medication on the 26th of October. I notified her that, unfortunately, I do not have an appointment with Dr. Lynch available before then.    Pt requesting refill on Norco  mg 1 po q 6 hours prn. #120.  Last fill: 9/19/2019    Pharmacy: [facility] in [place].

## 2019-11-27 DIAGNOSIS — G89.29 OTHER CHRONIC PAIN: ICD-10-CM

## 2019-11-27 RX ORDER — HYDROCODONE BITARTRATE AND ACETAMINOPHEN 10; 325 MG/1; MG/1
1 TABLET ORAL EVERY 6 HOURS PRN
Qty: 120 TABLET | Refills: 0 | Status: SHIPPED | OUTPATIENT
Start: 2019-11-27 | End: 2020-01-23 | Stop reason: SDUPTHER

## 2019-11-27 NOTE — TELEPHONE ENCOUNTER
----- Message from Kathy Coles sent at 2019  8:57 AM CST -----  Contact: ward Leach  MRN: [id number]  : 7/3/1933  PCP: Tomasz Lynch  Home Phone      [phone]  Work Phone      Not on file.  Mobile          [phone]      MESSAGE:   Pt requesting refill or new Rx.   Is this a refill or new RX:  refill  RX name and strength: HYDROcodone-acetaminophen (NORCO)  mg per tablet  Last office visit: 19  Is this a 30-day or 90-day RX:  30  Pharmacy name and location:  [facility]  Comments:      Phone:  [phone]

## 2019-12-03 ENCOUNTER — LAB VISIT (OUTPATIENT)
Dept: LAB | Facility: HOSPITAL | Age: 84
End: 2019-12-03
Attending: INTERNAL MEDICINE
Payer: MEDICARE

## 2019-12-03 DIAGNOSIS — D64.9 ANEMIA: Primary | ICD-10-CM

## 2019-12-03 DIAGNOSIS — R93.3 GASTROINTESTINAL TRACT IMAGING ABNORMALITY: ICD-10-CM

## 2019-12-03 DIAGNOSIS — K59.00 CN (CONSTIPATION): ICD-10-CM

## 2019-12-03 LAB
BASOPHILS # BLD AUTO: 0.01 K/UL (ref 0–0.2)
BASOPHILS NFR BLD: 0.2 % (ref 0–1.9)
DIFFERENTIAL METHOD: ABNORMAL
EOSINOPHIL # BLD AUTO: 0 K/UL (ref 0–0.5)
EOSINOPHIL NFR BLD: 0.4 % (ref 0–8)
ERYTHROCYTE [DISTWIDTH] IN BLOOD BY AUTOMATED COUNT: 13.9 % (ref 11.5–14.5)
FERRITIN SERPL-MCNC: 36 NG/ML (ref 20–300)
HCT VFR BLD AUTO: 36.4 % (ref 37–48.5)
HGB BLD-MCNC: 11.8 G/DL (ref 12–16)
IMM GRANULOCYTES # BLD AUTO: 0.07 K/UL (ref 0–0.04)
IMM GRANULOCYTES NFR BLD AUTO: 1.2 % (ref 0–0.5)
IRON SERPL-MCNC: 67 UG/DL (ref 30–160)
LYMPHOCYTES # BLD AUTO: 2 K/UL (ref 1–4.8)
LYMPHOCYTES NFR BLD: 36.1 % (ref 18–48)
MCH RBC QN AUTO: 31.7 PG (ref 27–31)
MCHC RBC AUTO-ENTMCNC: 32.4 G/DL (ref 32–36)
MCV RBC AUTO: 98 FL (ref 82–98)
MONOCYTES # BLD AUTO: 0.6 K/UL (ref 0.3–1)
MONOCYTES NFR BLD: 11.2 % (ref 4–15)
NEUTROPHILS # BLD AUTO: 2.9 K/UL (ref 1.8–7.7)
NEUTROPHILS NFR BLD: 50.9 % (ref 38–73)
NRBC BLD-RTO: 0 /100 WBC
PLATELET # BLD AUTO: 155 K/UL (ref 150–350)
PMV BLD AUTO: 9.7 FL (ref 9.2–12.9)
RBC # BLD AUTO: 3.72 M/UL (ref 4–5.4)
RETICS/RBC NFR AUTO: 1.5 % (ref 0.5–2.5)
SATURATED IRON: 17 % (ref 20–50)
TOTAL IRON BINDING CAPACITY: 388 UG/DL (ref 250–450)
TRANSFERRIN SERPL-MCNC: 262 MG/DL (ref 200–375)
WBC # BLD AUTO: 5.62 K/UL (ref 3.9–12.7)

## 2019-12-03 PROCEDURE — 82728 ASSAY OF FERRITIN: CPT

## 2019-12-03 PROCEDURE — 83540 ASSAY OF IRON: CPT

## 2019-12-03 PROCEDURE — 85025 COMPLETE CBC W/AUTO DIFF WBC: CPT

## 2019-12-03 PROCEDURE — 85045 AUTOMATED RETICULOCYTE COUNT: CPT

## 2019-12-03 PROCEDURE — 36415 COLL VENOUS BLD VENIPUNCTURE: CPT

## 2019-12-12 ENCOUNTER — TELEPHONE (OUTPATIENT)
Dept: FAMILY MEDICINE | Facility: CLINIC | Age: 84
End: 2019-12-12

## 2019-12-12 ENCOUNTER — OFFICE VISIT (OUTPATIENT)
Dept: FAMILY MEDICINE | Facility: CLINIC | Age: 84
End: 2019-12-12
Payer: MEDICARE

## 2019-12-12 VITALS
WEIGHT: 137.13 LBS | HEART RATE: 68 BPM | BODY MASS INDEX: 27.64 KG/M2 | RESPIRATION RATE: 16 BRPM | DIASTOLIC BLOOD PRESSURE: 72 MMHG | SYSTOLIC BLOOD PRESSURE: 122 MMHG | HEIGHT: 59 IN

## 2019-12-12 DIAGNOSIS — I10 ESSENTIAL HYPERTENSION: ICD-10-CM

## 2019-12-12 DIAGNOSIS — G89.4 CHRONIC PAIN SYNDROME: Primary | ICD-10-CM

## 2019-12-12 DIAGNOSIS — N30.00 ACUTE CYSTITIS WITHOUT HEMATURIA: ICD-10-CM

## 2019-12-12 DIAGNOSIS — G57.90 NEUROPATHY OF LOWER EXTREMITY, UNSPECIFIED LATERALITY: ICD-10-CM

## 2019-12-12 DIAGNOSIS — M79.7 FIBROMYALGIA: ICD-10-CM

## 2019-12-12 LAB
BACTERIA SPEC CULT: NORMAL
BILIRUB SERPL-MCNC: 3 MG/DL
BLOOD URINE, POC: NORMAL
CASTS: NORMAL
COLOR, POC UA: NORMAL
CRYSTALS: NORMAL
GLUCOSE UR QL STRIP: NORMAL
KETONES UR QL STRIP: NORMAL
LEUKOCYTE ESTERASE URINE, POC: NORMAL
NITRITE, POC UA: NORMAL
PH, POC UA: 5
PROTEIN, POC: 100
RBC CELLS COUNTED: NORMAL
SPECIFIC GRAVITY, POC UA: 1.02
UROBILINOGEN, POC UA: 4
WHITE BLOOD CELLS: NORMAL

## 2019-12-12 PROCEDURE — 99999 PR PBB SHADOW E&M-EST. PATIENT-LVL III: ICD-10-PCS | Mod: PBBFAC,,, | Performed by: FAMILY MEDICINE

## 2019-12-12 PROCEDURE — 1125F AMNT PAIN NOTED PAIN PRSNT: CPT | Mod: S$GLB,,, | Performed by: FAMILY MEDICINE

## 2019-12-12 PROCEDURE — 1159F PR MEDICATION LIST DOCUMENTED IN MEDICAL RECORD: ICD-10-PCS | Mod: S$GLB,,, | Performed by: FAMILY MEDICINE

## 2019-12-12 PROCEDURE — 1101F PR PT FALLS ASSESS DOC 0-1 FALLS W/OUT INJ PAST YR: ICD-10-PCS | Mod: CPTII,S$GLB,, | Performed by: FAMILY MEDICINE

## 2019-12-12 PROCEDURE — 99999 PR PBB SHADOW E&M-EST. PATIENT-LVL III: CPT | Mod: PBBFAC,,, | Performed by: FAMILY MEDICINE

## 2019-12-12 PROCEDURE — 1101F PT FALLS ASSESS-DOCD LE1/YR: CPT | Mod: CPTII,S$GLB,, | Performed by: FAMILY MEDICINE

## 2019-12-12 PROCEDURE — 1125F PR PAIN SEVERITY QUANTIFIED, PAIN PRESENT: ICD-10-PCS | Mod: S$GLB,,, | Performed by: FAMILY MEDICINE

## 2019-12-12 PROCEDURE — 99213 PR OFFICE/OUTPT VISIT, EST, LEVL III, 20-29 MIN: ICD-10-PCS | Mod: 25,S$GLB,, | Performed by: FAMILY MEDICINE

## 2019-12-12 PROCEDURE — 1159F MED LIST DOCD IN RCRD: CPT | Mod: S$GLB,,, | Performed by: FAMILY MEDICINE

## 2019-12-12 PROCEDURE — 99213 OFFICE O/P EST LOW 20 MIN: CPT | Mod: 25,S$GLB,, | Performed by: FAMILY MEDICINE

## 2019-12-12 PROCEDURE — 81000 URINALYSIS NONAUTO W/SCOPE: CPT | Mod: S$GLB,,, | Performed by: FAMILY MEDICINE

## 2019-12-12 PROCEDURE — 81000 POCT URINE SEDIMENT EXAM: ICD-10-PCS | Mod: S$GLB,,, | Performed by: FAMILY MEDICINE

## 2019-12-12 RX ORDER — TORSEMIDE 10 MG/1
TABLET ORAL
COMMUNITY

## 2019-12-12 RX ORDER — NIFEDIPINE 30 MG/1
TABLET, EXTENDED RELEASE ORAL
COMMUNITY

## 2019-12-12 RX ORDER — HYDROCODONE BITARTRATE AND ACETAMINOPHEN 10; 325 MG/1; MG/1
1 TABLET ORAL EVERY 6 HOURS PRN
Qty: 120 TABLET | Refills: 0 | Status: SHIPPED | OUTPATIENT
Start: 2019-12-17 | End: 2020-01-23 | Stop reason: SDUPTHER

## 2019-12-12 RX ORDER — METOPROLOL TARTRATE 100 MG/1
TABLET ORAL
COMMUNITY

## 2019-12-12 RX ORDER — BUPRENORPHINE 10 UG/H
10 PATCH TRANSDERMAL
Qty: 4 PATCH | Refills: 3 | Status: SHIPPED | OUTPATIENT
Start: 2019-12-12 | End: 2019-12-22

## 2019-12-12 RX ORDER — LOSARTAN POTASSIUM AND HYDROCHLOROTHIAZIDE 12.5; 1 MG/1; MG/1
TABLET ORAL
COMMUNITY

## 2019-12-12 RX ORDER — LEVOTHYROXINE SODIUM 50 UG/1
TABLET ORAL
COMMUNITY
End: 2020-12-15 | Stop reason: SDUPTHER

## 2019-12-12 NOTE — PROGRESS NOTES
Subjective:       Patient ID: Cheyenne Leach is a 86 y.o. female.    Chief Complaint: Pain    Pt is a 86 y.o. female who presents for check up for chronic generalized pain. Doing well on current meds. Denies any side effects. Prevention is up to date.    Review of Systems   Constitutional: Positive for activity change. Negative for appetite change, chills and fever.        Generalized pain from her back surgery   HENT: Negative for rhinorrhea, sinus pressure, sore throat and trouble swallowing.    Respiratory: Negative for cough, chest tightness, shortness of breath and wheezing.    Cardiovascular: Negative for chest pain and palpitations.   Gastrointestinal: Negative for abdominal pain, blood in stool, diarrhea, nausea and vomiting.        Having good BMs   Genitourinary: Negative for dysuria, flank pain, hematuria, pelvic pain, urgency, vaginal bleeding, vaginal discharge and vaginal pain.   Musculoskeletal: Positive for back pain and gait problem. Negative for joint swelling and neck stiffness.   Skin: Negative for rash.   Neurological: Negative for dizziness, weakness, light-headedness, numbness and headaches.   Hematological: Does not bruise/bleed easily.   Psychiatric/Behavioral: Negative for agitation. The patient is not nervous/anxious.        Objective:      Physical Exam   Constitutional: She is oriented to person, place, and time. She appears well-developed and well-nourished.   HENT:   Head: Normocephalic.   Eyes: Pupils are equal, round, and reactive to light.   Neck: Normal range of motion. Neck supple. No thyromegaly present.   Cardiovascular: Normal rate and regular rhythm.   Pulmonary/Chest: No respiratory distress. She has no wheezes. She has no rales. She exhibits no tenderness.   Abdominal: She exhibits no distension. There is no tenderness. There is no rebound and no guarding.   Musculoskeletal: Normal range of motion. She exhibits no edema or tenderness.   Lymphadenopathy:     She has no  cervical adenopathy.   Neurological: She is alert and oriented to person, place, and time. She has normal reflexes. She displays normal reflexes. No cranial nerve deficit. She exhibits normal muscle tone. Coordination normal.   Skin: Skin is warm and dry. No rash noted. No pallor.   Psychiatric: She has a normal mood and affect. Judgment and thought content normal.       Assessment:       1. Chronic pain syndrome    2. Essential hypertension    3. Acute cystitis without hematuria    4. Neuropathy of lower extremity, unspecified laterality    5. Fibromyalgia        Plan:   Cheyenne was seen today for pain.    Diagnoses and all orders for this visit:    Chronic pain syndrome  -     buprenorphine (BUTRANS) 10 mcg/hour PTWK; Place 10 mcg/hr onto the skin every 7 days. for 10 days    Essential hypertension    Acute cystitis without hematuria  -     POCT urinalysis, dipstick or tablet reag  -     POCT URINE SEDIMENT EXAM    Neuropathy of lower extremity, unspecified laterality    Fibromyalgia    Other orders  -     HYDROcodone-acetaminophen (NORCO)  mg per tablet; Take 1 tablet by mouth every 6 (six) hours as needed. Opioid is medically necessary; 7 day exempt, for chronic pain

## 2020-01-23 ENCOUNTER — OFFICE VISIT (OUTPATIENT)
Dept: FAMILY MEDICINE | Facility: CLINIC | Age: 85
End: 2020-01-23
Payer: MEDICARE

## 2020-01-23 VITALS
SYSTOLIC BLOOD PRESSURE: 140 MMHG | HEART RATE: 72 BPM | RESPIRATION RATE: 20 BRPM | HEIGHT: 59 IN | DIASTOLIC BLOOD PRESSURE: 68 MMHG | WEIGHT: 140.19 LBS | BODY MASS INDEX: 28.26 KG/M2

## 2020-01-23 DIAGNOSIS — M79.7 FIBROMYALGIA: ICD-10-CM

## 2020-01-23 DIAGNOSIS — I10 ESSENTIAL HYPERTENSION: ICD-10-CM

## 2020-01-23 DIAGNOSIS — G89.29 OTHER CHRONIC PAIN: ICD-10-CM

## 2020-01-23 DIAGNOSIS — G89.4 CHRONIC PAIN SYNDROME: Primary | ICD-10-CM

## 2020-01-23 PROCEDURE — 1126F AMNT PAIN NOTED NONE PRSNT: CPT | Mod: S$GLB,,, | Performed by: FAMILY MEDICINE

## 2020-01-23 PROCEDURE — 99213 OFFICE O/P EST LOW 20 MIN: CPT | Mod: S$GLB,,, | Performed by: FAMILY MEDICINE

## 2020-01-23 PROCEDURE — 1159F PR MEDICATION LIST DOCUMENTED IN MEDICAL RECORD: ICD-10-PCS | Mod: S$GLB,,, | Performed by: FAMILY MEDICINE

## 2020-01-23 PROCEDURE — 1101F PR PT FALLS ASSESS DOC 0-1 FALLS W/OUT INJ PAST YR: ICD-10-PCS | Mod: CPTII,S$GLB,, | Performed by: FAMILY MEDICINE

## 2020-01-23 PROCEDURE — 99999 PR PBB SHADOW E&M-EST. PATIENT-LVL III: ICD-10-PCS | Mod: PBBFAC,,, | Performed by: FAMILY MEDICINE

## 2020-01-23 PROCEDURE — 1159F MED LIST DOCD IN RCRD: CPT | Mod: S$GLB,,, | Performed by: FAMILY MEDICINE

## 2020-01-23 PROCEDURE — 1101F PT FALLS ASSESS-DOCD LE1/YR: CPT | Mod: CPTII,S$GLB,, | Performed by: FAMILY MEDICINE

## 2020-01-23 PROCEDURE — 99999 PR PBB SHADOW E&M-EST. PATIENT-LVL III: CPT | Mod: PBBFAC,,, | Performed by: FAMILY MEDICINE

## 2020-01-23 PROCEDURE — 1126F PR PAIN SEVERITY QUANTIFIED, NO PAIN PRESENT: ICD-10-PCS | Mod: S$GLB,,, | Performed by: FAMILY MEDICINE

## 2020-01-23 PROCEDURE — 99213 PR OFFICE/OUTPT VISIT, EST, LEVL III, 20-29 MIN: ICD-10-PCS | Mod: S$GLB,,, | Performed by: FAMILY MEDICINE

## 2020-01-23 RX ORDER — HYDROCODONE BITARTRATE AND ACETAMINOPHEN 10; 325 MG/1; MG/1
1 TABLET ORAL EVERY 6 HOURS PRN
Qty: 120 TABLET | Refills: 0 | Status: SHIPPED | OUTPATIENT
Start: 2020-01-25 | End: 2020-03-12 | Stop reason: SDUPTHER

## 2020-01-23 RX ORDER — HYDROCODONE BITARTRATE AND ACETAMINOPHEN 10; 325 MG/1; MG/1
TABLET ORAL
Qty: 120 TABLET | Refills: 0 | Status: SHIPPED | OUTPATIENT
Start: 2020-03-23 | End: 2020-03-12 | Stop reason: SDUPTHER

## 2020-01-23 RX ORDER — HYDROCODONE BITARTRATE AND ACETAMINOPHEN 10; 325 MG/1; MG/1
1 TABLET ORAL EVERY 6 HOURS PRN
Qty: 120 TABLET | Refills: 0 | Status: SHIPPED | OUTPATIENT
Start: 2020-02-23 | End: 2020-03-12 | Stop reason: SDUPTHER

## 2020-01-23 NOTE — PROGRESS NOTES
Subjective:       Patient ID: Cheyenne Leach is a 86 y.o. female.    Chief Complaint: Follow-up    Pt is a 86 y.o. female who presents for check up for HTN & Fibromyalgia. Doing well on current meds. Denies any side effects. Prevention is up to date.    Review of Systems   Constitutional: Negative for appetite change, chills and fever.   HENT: Negative for rhinorrhea, sinus pressure, sore throat and trouble swallowing.    Respiratory: Negative for cough, chest tightness, shortness of breath and wheezing.    Cardiovascular: Negative for chest pain and palpitations.   Gastrointestinal: Negative for abdominal pain, blood in stool, diarrhea, nausea and vomiting.   Genitourinary: Negative for dysuria, flank pain, hematuria, pelvic pain, urgency, vaginal bleeding, vaginal discharge and vaginal pain.   Musculoskeletal: Positive for back pain. Negative for joint swelling and neck stiffness.        Pain is terrible   Skin: Negative for rash.   Neurological: Negative for dizziness, weakness, light-headedness, numbness and headaches.   Hematological: Does not bruise/bleed easily.   Psychiatric/Behavioral: Negative for agitation. The patient is not nervous/anxious.        Objective:      Physical Exam   Constitutional: She is oriented to person, place, and time. She appears well-developed and well-nourished.   HENT:   Head: Normocephalic.   Eyes: Pupils are equal, round, and reactive to light.   Neck: Normal range of motion. Neck supple. No thyromegaly present.   Cardiovascular: Normal rate and regular rhythm.   Pulmonary/Chest: No respiratory distress. She has no wheezes. She has no rales. She exhibits no tenderness.   Abdominal: She exhibits no distension. There is no tenderness. There is no rebound and no guarding.   Musculoskeletal: Normal range of motion. She exhibits no edema or tenderness.   Lymphadenopathy:     She has no cervical adenopathy.   Neurological: She is alert and oriented to person, place, and time. She  has normal reflexes. She displays normal reflexes. No cranial nerve deficit. She exhibits normal muscle tone. Coordination normal.   Skin: Skin is warm and dry. No rash noted. No pallor.   Psychiatric: She has a normal mood and affect. Judgment and thought content normal.       Assessment:       1. Chronic pain syndrome    2. Other chronic pain    3. Fibromyalgia    4. Essential hypertension        Plan:   Cheyenne was seen today for follow-up.    Diagnoses and all orders for this visit:    Chronic pain syndrome    Other chronic pain  -     HYDROcodone-acetaminophen (NORCO)  mg per tablet; Take 1 tablet by mouth every 6 (six) hours as needed. Quantity medically necessary, 7 day limit excluded    Fibromyalgia    Essential hypertension    Other orders  -     HYDROcodone-acetaminophen (NORCO)  mg per tablet; One po q 6 hrs for back pain; opioid medically necessary & exempt for greater then 7 days  -     HYDROcodone-acetaminophen (NORCO)  mg per tablet; Take 1 tablet by mouth every 6 (six) hours as needed. Opioid is medically necessary; 7 day exempt, for chronic pain

## 2020-02-12 ENCOUNTER — TELEPHONE (OUTPATIENT)
Dept: FAMILY MEDICINE | Facility: CLINIC | Age: 85
End: 2020-02-12

## 2020-02-12 NOTE — TELEPHONE ENCOUNTER
----- Message from Fannie Jason sent at 2020  8:14 AM CST -----  Contact: ward Leach  MRN: [id number]  : 7/3/1933  PCP: Tomasz Lynch  Home Phone      [phone]  Work Phone      Not on file.  Mobile          [phone]      MESSAGE:   Pt requests a sooner appointment than the  can schedule.  Does patient feel like they need to be seen today:  yes  What is the nature of the appointment:  Pain in right knee  What visit type:  ep  Did you check other providers/department schedules for availability:   yes  Comments:     Phone:  [phone]

## 2020-02-13 ENCOUNTER — OFFICE VISIT (OUTPATIENT)
Dept: FAMILY MEDICINE | Facility: CLINIC | Age: 85
End: 2020-02-13
Payer: MEDICARE

## 2020-02-13 VITALS
HEART RATE: 78 BPM | SYSTOLIC BLOOD PRESSURE: 160 MMHG | BODY MASS INDEX: 27.56 KG/M2 | RESPIRATION RATE: 20 BRPM | DIASTOLIC BLOOD PRESSURE: 80 MMHG | HEIGHT: 59 IN | WEIGHT: 136.69 LBS

## 2020-02-13 DIAGNOSIS — I10 ESSENTIAL HYPERTENSION: ICD-10-CM

## 2020-02-13 DIAGNOSIS — G89.29 OTHER CHRONIC PAIN: ICD-10-CM

## 2020-02-13 DIAGNOSIS — M25.561 RECURRENT PAIN OF RIGHT KNEE: Primary | ICD-10-CM

## 2020-02-13 DIAGNOSIS — G57.90 NEUROPATHY OF LOWER EXTREMITY, UNSPECIFIED LATERALITY: ICD-10-CM

## 2020-02-13 PROCEDURE — 1101F PT FALLS ASSESS-DOCD LE1/YR: CPT | Mod: CPTII,S$GLB,, | Performed by: FAMILY MEDICINE

## 2020-02-13 PROCEDURE — 1101F PR PT FALLS ASSESS DOC 0-1 FALLS W/OUT INJ PAST YR: ICD-10-PCS | Mod: CPTII,S$GLB,, | Performed by: FAMILY MEDICINE

## 2020-02-13 PROCEDURE — 99213 PR OFFICE/OUTPT VISIT, EST, LEVL III, 20-29 MIN: ICD-10-PCS | Mod: 25,S$GLB,, | Performed by: FAMILY MEDICINE

## 2020-02-13 PROCEDURE — 99999 PR PBB SHADOW E&M-EST. PATIENT-LVL IV: ICD-10-PCS | Mod: PBBFAC,,, | Performed by: FAMILY MEDICINE

## 2020-02-13 PROCEDURE — 20610 PR DRAIN/INJECT LARGE JOINT/BURSA: ICD-10-PCS | Mod: RT,S$GLB,, | Performed by: FAMILY MEDICINE

## 2020-02-13 PROCEDURE — 1159F PR MEDICATION LIST DOCUMENTED IN MEDICAL RECORD: ICD-10-PCS | Mod: S$GLB,,, | Performed by: FAMILY MEDICINE

## 2020-02-13 PROCEDURE — 1125F AMNT PAIN NOTED PAIN PRSNT: CPT | Mod: S$GLB,,, | Performed by: FAMILY MEDICINE

## 2020-02-13 PROCEDURE — 1159F MED LIST DOCD IN RCRD: CPT | Mod: S$GLB,,, | Performed by: FAMILY MEDICINE

## 2020-02-13 PROCEDURE — 99213 OFFICE O/P EST LOW 20 MIN: CPT | Mod: 25,S$GLB,, | Performed by: FAMILY MEDICINE

## 2020-02-13 PROCEDURE — 1125F PR PAIN SEVERITY QUANTIFIED, PAIN PRESENT: ICD-10-PCS | Mod: S$GLB,,, | Performed by: FAMILY MEDICINE

## 2020-02-13 PROCEDURE — 99999 PR PBB SHADOW E&M-EST. PATIENT-LVL IV: CPT | Mod: PBBFAC,,, | Performed by: FAMILY MEDICINE

## 2020-02-13 PROCEDURE — 20610 DRAIN/INJ JOINT/BURSA W/O US: CPT | Mod: RT,S$GLB,, | Performed by: FAMILY MEDICINE

## 2020-02-13 RX ORDER — KETOROLAC TROMETHAMINE 30 MG/ML
15 INJECTION, SOLUTION INTRAMUSCULAR; INTRAVENOUS
Status: COMPLETED | OUTPATIENT
Start: 2020-02-13 | End: 2020-02-13

## 2020-02-13 RX ORDER — BUPIVACAINE HYDROCHLORIDE 5 MG/ML
1 INJECTION, SOLUTION EPIDURAL; INTRACAUDAL
Status: COMPLETED | OUTPATIENT
Start: 2020-02-13 | End: 2020-02-13

## 2020-02-13 RX ORDER — METHYLPREDNISOLONE ACETATE 40 MG/ML
40 INJECTION, SUSPENSION INTRA-ARTICULAR; INTRALESIONAL; INTRAMUSCULAR; SOFT TISSUE
Status: COMPLETED | OUTPATIENT
Start: 2020-02-13 | End: 2020-02-13

## 2020-02-13 RX ADMIN — METHYLPREDNISOLONE ACETATE 40 MG: 40 INJECTION, SUSPENSION INTRA-ARTICULAR; INTRALESIONAL; INTRAMUSCULAR; SOFT TISSUE at 11:02

## 2020-02-13 RX ADMIN — KETOROLAC TROMETHAMINE 15 MG: 30 INJECTION, SOLUTION INTRAMUSCULAR; INTRAVENOUS at 11:02

## 2020-02-13 RX ADMIN — BUPIVACAINE HYDROCHLORIDE 5 MG: 5 INJECTION, SOLUTION EPIDURAL; INTRACAUDAL at 11:02

## 2020-02-13 NOTE — PROGRESS NOTES
Subjective:       Patient ID: Cheyenne Leach is a 86 y.o. female.    Chief Complaint: Knee Pain (R knee )    Pt is a 86 y.o. female who presents for check up for Recurrent pain of right knee  (primary encounter diagnosis). Doing well on current meds. Denies any side effects. Prevention is up to date.    Review of Systems   Constitutional: Negative for appetite change, chills and fever.   HENT: Negative for rhinorrhea, sinus pressure, sore throat and trouble swallowing.    Respiratory: Negative for cough, chest tightness, shortness of breath and wheezing.    Cardiovascular: Negative for chest pain and palpitations.   Gastrointestinal: Negative for abdominal pain, blood in stool, diarrhea, nausea and vomiting.   Genitourinary: Negative for dysuria, flank pain, hematuria, pelvic pain, urgency, vaginal bleeding, vaginal discharge and vaginal pain.   Musculoskeletal: Positive for arthralgias and joint swelling. Negative for back pain and neck stiffness.        R knee with TTP 3+++   Skin: Negative for rash.   Neurological: Negative for dizziness, weakness, light-headedness, numbness and headaches.   Hematological: Does not bruise/bleed easily.   Psychiatric/Behavioral: Negative for agitation. The patient is not nervous/anxious.        Objective:      Physical Exam   Constitutional: She is oriented to person, place, and time. She appears well-developed and well-nourished.   HENT:   Head: Normocephalic.   Eyes: Pupils are equal, round, and reactive to light.   Neck: Normal range of motion. Neck supple. No thyromegaly present.   Cardiovascular: Normal rate and regular rhythm.   Pulmonary/Chest: No respiratory distress. She has no wheezes. She has no rales. She exhibits no tenderness.   Abdominal: She exhibits no distension. There is no tenderness. There is no rebound and no guarding.   Musculoskeletal: Normal range of motion. She exhibits no edema or tenderness.   Lymphadenopathy:     She has no cervical adenopathy.    Neurological: She is alert and oriented to person, place, and time. She has normal reflexes. She displays normal reflexes. No cranial nerve deficit. She exhibits normal muscle tone. Coordination normal.   Skin: Skin is warm and dry. No rash noted. No pallor.   Psychiatric: She has a normal mood and affect. Judgment and thought content normal.       Assessment:       1. Recurrent pain of right knee    2. Neuropathy of lower extremity, unspecified laterality    3. Other chronic pain    4. Essential hypertension        Plan:   Cheyenne was seen today for knee pain.    Diagnoses and all orders for this visit:    Recurrent pain of right knee  -     ketorolac injection 15 mg  -     bupivacaine (PF) 0.5% (5 mg/mL) injection 5 mg  -     methylPREDNISolone acetate injection 40 mg    Neuropathy of lower extremity, unspecified laterality    Other chronic pain    Essential hypertension    Pt's R knee was prepped with Betadine and 1cc Medrol, 1cc Marcaine, & 15 mg Toradol was injected interarticularly into the R anterior knee compartment

## 2020-03-12 ENCOUNTER — OFFICE VISIT (OUTPATIENT)
Dept: FAMILY MEDICINE | Facility: CLINIC | Age: 85
End: 2020-03-12
Payer: MEDICARE

## 2020-03-12 VITALS
SYSTOLIC BLOOD PRESSURE: 148 MMHG | RESPIRATION RATE: 16 BRPM | HEIGHT: 59 IN | DIASTOLIC BLOOD PRESSURE: 62 MMHG | WEIGHT: 132.19 LBS | HEART RATE: 88 BPM | BODY MASS INDEX: 26.65 KG/M2

## 2020-03-12 DIAGNOSIS — G89.4 CHRONIC PAIN SYNDROME: ICD-10-CM

## 2020-03-12 DIAGNOSIS — G89.29 OTHER CHRONIC PAIN: ICD-10-CM

## 2020-03-12 DIAGNOSIS — I10 ESSENTIAL HYPERTENSION: ICD-10-CM

## 2020-03-12 DIAGNOSIS — M25.561 RECURRENT PAIN OF RIGHT KNEE: Primary | ICD-10-CM

## 2020-03-12 DIAGNOSIS — M79.7 FIBROMYALGIA: ICD-10-CM

## 2020-03-12 PROCEDURE — 99999 PR PBB SHADOW E&M-EST. PATIENT-LVL IV: ICD-10-PCS | Mod: PBBFAC,,, | Performed by: FAMILY MEDICINE

## 2020-03-12 PROCEDURE — 1159F MED LIST DOCD IN RCRD: CPT | Mod: S$GLB,,, | Performed by: FAMILY MEDICINE

## 2020-03-12 PROCEDURE — 1125F AMNT PAIN NOTED PAIN PRSNT: CPT | Mod: S$GLB,,, | Performed by: FAMILY MEDICINE

## 2020-03-12 PROCEDURE — 99213 OFFICE O/P EST LOW 20 MIN: CPT | Mod: S$GLB,,, | Performed by: FAMILY MEDICINE

## 2020-03-12 PROCEDURE — 1125F PR PAIN SEVERITY QUANTIFIED, PAIN PRESENT: ICD-10-PCS | Mod: S$GLB,,, | Performed by: FAMILY MEDICINE

## 2020-03-12 PROCEDURE — 1101F PT FALLS ASSESS-DOCD LE1/YR: CPT | Mod: CPTII,S$GLB,, | Performed by: FAMILY MEDICINE

## 2020-03-12 PROCEDURE — 1159F PR MEDICATION LIST DOCUMENTED IN MEDICAL RECORD: ICD-10-PCS | Mod: S$GLB,,, | Performed by: FAMILY MEDICINE

## 2020-03-12 PROCEDURE — 99213 PR OFFICE/OUTPT VISIT, EST, LEVL III, 20-29 MIN: ICD-10-PCS | Mod: S$GLB,,, | Performed by: FAMILY MEDICINE

## 2020-03-12 PROCEDURE — 99999 PR PBB SHADOW E&M-EST. PATIENT-LVL IV: CPT | Mod: PBBFAC,,, | Performed by: FAMILY MEDICINE

## 2020-03-12 PROCEDURE — 1101F PR PT FALLS ASSESS DOC 0-1 FALLS W/OUT INJ PAST YR: ICD-10-PCS | Mod: CPTII,S$GLB,, | Performed by: FAMILY MEDICINE

## 2020-03-12 RX ORDER — HYDROCODONE BITARTRATE AND ACETAMINOPHEN 10; 325 MG/1; MG/1
1 TABLET ORAL EVERY 6 HOURS PRN
Qty: 120 TABLET | Refills: 0 | Status: ON HOLD | OUTPATIENT
Start: 2020-04-23 | End: 2020-06-06 | Stop reason: HOSPADM

## 2020-03-12 RX ORDER — HYDROCODONE BITARTRATE AND ACETAMINOPHEN 10; 325 MG/1; MG/1
1 TABLET ORAL EVERY 6 HOURS PRN
Qty: 120 TABLET | Refills: 0 | Status: ON HOLD | OUTPATIENT
Start: 2020-06-22 | End: 2020-06-06 | Stop reason: HOSPADM

## 2020-03-12 RX ORDER — HYDROCODONE BITARTRATE AND ACETAMINOPHEN 10; 325 MG/1; MG/1
TABLET ORAL
Qty: 120 TABLET | Refills: 0 | Status: SHIPPED | OUTPATIENT
Start: 2020-05-22 | End: 2020-06-15 | Stop reason: SDUPTHER

## 2020-03-12 NOTE — PROGRESS NOTES
Subjective:       Patient ID: Cheyenne Leach is a 86 y.o. female.    Chief Complaint: Follow-up    Pt is a 86 y.o. female who presents for check up for Recurrent pain of right knee  (primary encounter diagnosis)  Essential hypertension  Chronic pain syndrome  Fibromyalgia. Doing well on current meds. Denies any side effects. Prevention is up to date.    Review of Systems   Gastrointestinal:        Having BMs   Musculoskeletal: Positive for arthralgias and gait problem.        R knee 5/10       Objective:      Physical Exam   Constitutional: She is oriented to person, place, and time. She appears well-developed and well-nourished.   HENT:   Head: Normocephalic.   Eyes: Pupils are equal, round, and reactive to light.   Neck: Normal range of motion. Neck supple. No thyromegaly present.   Cardiovascular: Normal rate and regular rhythm.   Pulmonary/Chest: No respiratory distress. She has no wheezes. She has no rales. She exhibits no tenderness.   Abdominal: She exhibits no distension. There is no tenderness. There is no rebound and no guarding.   Musculoskeletal: Normal range of motion. She exhibits no edema or tenderness.   R knee lateral tenderness   Lymphadenopathy:     She has no cervical adenopathy.   Neurological: She is alert and oriented to person, place, and time. She has normal reflexes. She displays normal reflexes. No cranial nerve deficit. She exhibits normal muscle tone. Coordination normal.   Skin: Skin is warm and dry. No rash noted. No pallor.   Psychiatric: She has a normal mood and affect. Judgment and thought content normal.       Assessment:       1. Recurrent pain of right knee    2. Essential hypertension    3. Chronic pain syndrome    4. Fibromyalgia    5. Other chronic pain        Plan:   Cheyenne was seen today for follow-up.    Diagnoses and all orders for this visit:    Recurrent pain of right knee    Essential hypertension    Chronic pain syndrome    Fibromyalgia    Other chronic pain  -      HYDROcodone-acetaminophen (NORCO)  mg per tablet; Take 1 tablet by mouth every 6 (six) hours as needed. Quantity medically necessary, 7 day limit excluded    Other orders  -     HYDROcodone-acetaminophen (NORCO)  mg per tablet; One po q 6 hrs for back pain; opioid medically necessary & exempt for greater then 7 days  -     HYDROcodone-acetaminophen (NORCO)  mg per tablet; Take 1 tablet by mouth every 6 (six) hours as needed. Opioid is medically necessary; 7 day exempt, for chronic pain

## 2020-04-25 DIAGNOSIS — M79.605 BILATERAL LEG PAIN: ICD-10-CM

## 2020-04-25 DIAGNOSIS — M79.604 BILATERAL LEG PAIN: ICD-10-CM

## 2020-04-25 DIAGNOSIS — M79.7 FIBROMYALGIA: ICD-10-CM

## 2020-04-25 DIAGNOSIS — G89.4 CHRONIC PAIN SYNDROME: ICD-10-CM

## 2020-04-27 RX ORDER — DICLOFENAC SODIUM 10 MG/G
GEL TOPICAL
Qty: 500 G | Refills: 0 | Status: SHIPPED | OUTPATIENT
Start: 2020-04-27 | End: 2020-11-10 | Stop reason: SDUPTHER

## 2020-06-04 ENCOUNTER — HOSPITAL ENCOUNTER (OUTPATIENT)
Facility: HOSPITAL | Age: 85
Discharge: HOME | End: 2020-06-06
Attending: SURGERY | Admitting: FAMILY MEDICINE
Payer: MEDICARE

## 2020-06-04 DIAGNOSIS — R10.9 ABDOMINAL PAIN: Primary | ICD-10-CM

## 2020-06-04 DIAGNOSIS — K59.00 CONSTIPATION, UNSPECIFIED CONSTIPATION TYPE: ICD-10-CM

## 2020-06-04 DIAGNOSIS — K83.9 BILIARY AND GALLBLADDER DISORDER: ICD-10-CM

## 2020-06-04 DIAGNOSIS — K83.8 DILATED CBD, ACQUIRED: ICD-10-CM

## 2020-06-04 LAB
ALBUMIN SERPL BCP-MCNC: 4 G/DL (ref 3.5–5.2)
ALP SERPL-CCNC: 88 U/L (ref 55–135)
ALT SERPL W/O P-5'-P-CCNC: 12 U/L (ref 10–44)
ANION GAP SERPL CALC-SCNC: 11 MMOL/L (ref 8–16)
AST SERPL-CCNC: 15 U/L (ref 10–40)
BACTERIA #/AREA URNS HPF: ABNORMAL /HPF
BASOPHILS # BLD AUTO: 0.01 K/UL (ref 0–0.2)
BASOPHILS NFR BLD: 0.2 % (ref 0–1.9)
BILIRUB SERPL-MCNC: 0.3 MG/DL (ref 0.1–1)
BILIRUB UR QL STRIP: NEGATIVE
BNP SERPL-MCNC: 192 PG/ML (ref 0–99)
BUN SERPL-MCNC: 16 MG/DL (ref 8–23)
CALCIUM SERPL-MCNC: 9.7 MG/DL (ref 8.7–10.5)
CHLORIDE SERPL-SCNC: 99 MMOL/L (ref 95–110)
CK MB SERPL-MCNC: 1.5 NG/ML (ref 0.1–6.5)
CK MB SERPL-RTO: 3.1 % (ref 0–5)
CK SERPL-CCNC: 49 U/L (ref 20–180)
CK SERPL-CCNC: 49 U/L (ref 20–180)
CLARITY UR: CLEAR
CO2 SERPL-SCNC: 30 MMOL/L (ref 23–29)
COLOR UR: ABNORMAL
CREAT SERPL-MCNC: 0.8 MG/DL (ref 0.5–1.4)
DIFFERENTIAL METHOD: ABNORMAL
EOSINOPHIL # BLD AUTO: 0 K/UL (ref 0–0.5)
EOSINOPHIL NFR BLD: 0.2 % (ref 0–8)
ERYTHROCYTE [DISTWIDTH] IN BLOOD BY AUTOMATED COUNT: 13.6 % (ref 11.5–14.5)
EST. GFR  (AFRICAN AMERICAN): >60 ML/MIN/1.73 M^2
EST. GFR  (NON AFRICAN AMERICAN): >60 ML/MIN/1.73 M^2
GLUCOSE SERPL-MCNC: 105 MG/DL (ref 70–110)
GLUCOSE UR QL STRIP: ABNORMAL
HCT VFR BLD AUTO: 35 % (ref 37–48.5)
HGB BLD-MCNC: 11.4 G/DL (ref 12–16)
HGB UR QL STRIP: ABNORMAL
HYALINE CASTS #/AREA URNS LPF: 0 /LPF
IMM GRANULOCYTES # BLD AUTO: 0.08 K/UL (ref 0–0.04)
IMM GRANULOCYTES NFR BLD AUTO: 1.6 % (ref 0–0.5)
KETONES UR QL STRIP: ABNORMAL
LEUKOCYTE ESTERASE UR QL STRIP: NEGATIVE
LIPASE SERPL-CCNC: 30 U/L (ref 4–60)
LYMPHOCYTES # BLD AUTO: 2 K/UL (ref 1–4.8)
LYMPHOCYTES NFR BLD: 39.9 % (ref 18–48)
MCH RBC QN AUTO: 32.4 PG (ref 27–31)
MCHC RBC AUTO-ENTMCNC: 32.6 G/DL (ref 32–36)
MCV RBC AUTO: 99 FL (ref 82–98)
MICROSCOPIC COMMENT: ABNORMAL
MONOCYTES # BLD AUTO: 0.6 K/UL (ref 0.3–1)
MONOCYTES NFR BLD: 11.5 % (ref 4–15)
NEUTROPHILS # BLD AUTO: 2.4 K/UL (ref 1.8–7.7)
NEUTROPHILS NFR BLD: 46.6 % (ref 38–73)
NITRITE UR QL STRIP: POSITIVE
NRBC BLD-RTO: 0 /100 WBC
PH UR STRIP: >8 [PH] (ref 5–8)
PLATELET # BLD AUTO: 170 K/UL (ref 150–350)
PMV BLD AUTO: 9.6 FL (ref 9.2–12.9)
POTASSIUM SERPL-SCNC: 4 MMOL/L (ref 3.5–5.1)
PROT SERPL-MCNC: 6.8 G/DL (ref 6–8.4)
PROT UR QL STRIP: ABNORMAL
RBC # BLD AUTO: 3.52 M/UL (ref 4–5.4)
RBC #/AREA URNS HPF: 10 /HPF (ref 0–4)
SARS-COV-2 RDRP RESP QL NAA+PROBE: NEGATIVE
SODIUM SERPL-SCNC: 140 MMOL/L (ref 136–145)
SP GR UR STRIP: 1.01 (ref 1–1.03)
TROPONIN I SERPL DL<=0.01 NG/ML-MCNC: 0.01 NG/ML (ref 0–0.03)
URN SPEC COLLECT METH UR: ABNORMAL
UROBILINOGEN UR STRIP-ACNC: 1 EU/DL
WBC # BLD AUTO: 5.04 K/UL (ref 3.9–12.7)
WBC #/AREA URNS HPF: 1 /HPF (ref 0–5)

## 2020-06-04 PROCEDURE — 81000 URINALYSIS NONAUTO W/SCOPE: CPT

## 2020-06-04 PROCEDURE — 25000003 PHARM REV CODE 250: Performed by: SURGERY

## 2020-06-04 PROCEDURE — 84484 ASSAY OF TROPONIN QUANT: CPT

## 2020-06-04 PROCEDURE — 36415 COLL VENOUS BLD VENIPUNCTURE: CPT

## 2020-06-04 PROCEDURE — 80053 COMPREHEN METABOLIC PANEL: CPT

## 2020-06-04 PROCEDURE — 83690 ASSAY OF LIPASE: CPT

## 2020-06-04 PROCEDURE — 93010 ELECTROCARDIOGRAM REPORT: CPT | Mod: ,,, | Performed by: INTERNAL MEDICINE

## 2020-06-04 PROCEDURE — 96361 HYDRATE IV INFUSION ADD-ON: CPT

## 2020-06-04 PROCEDURE — 82553 CREATINE MB FRACTION: CPT

## 2020-06-04 PROCEDURE — 93010 EKG 12-LEAD: ICD-10-PCS | Mod: ,,, | Performed by: INTERNAL MEDICINE

## 2020-06-04 PROCEDURE — 96360 HYDRATION IV INFUSION INIT: CPT | Mod: 59

## 2020-06-04 PROCEDURE — U0002 COVID-19 LAB TEST NON-CDC: HCPCS

## 2020-06-04 PROCEDURE — 99285 EMERGENCY DEPT VISIT HI MDM: CPT | Mod: 25

## 2020-06-04 PROCEDURE — 85025 COMPLETE CBC W/AUTO DIFF WBC: CPT

## 2020-06-04 PROCEDURE — 83880 ASSAY OF NATRIURETIC PEPTIDE: CPT

## 2020-06-04 PROCEDURE — 93005 ELECTROCARDIOGRAM TRACING: CPT

## 2020-06-04 PROCEDURE — 25500020 PHARM REV CODE 255: Performed by: SURGERY

## 2020-06-04 PROCEDURE — 82550 ASSAY OF CK (CPK): CPT

## 2020-06-04 PROCEDURE — G0378 HOSPITAL OBSERVATION PER HR: HCPCS | Mod: CS

## 2020-06-04 RX ORDER — SODIUM CHLORIDE 9 MG/ML
1000 INJECTION, SOLUTION INTRAVENOUS
Status: COMPLETED | OUTPATIENT
Start: 2020-06-04 | End: 2020-06-04

## 2020-06-04 RX ORDER — LACTULOSE 10 G/15ML
20 SOLUTION ORAL 3 TIMES DAILY
Status: DISCONTINUED | OUTPATIENT
Start: 2020-06-05 | End: 2020-06-06 | Stop reason: HOSPADM

## 2020-06-04 RX ORDER — SODIUM CHLORIDE 9 MG/ML
INJECTION, SOLUTION INTRAVENOUS CONTINUOUS
Status: DISCONTINUED | OUTPATIENT
Start: 2020-06-04 | End: 2020-06-05

## 2020-06-04 RX ORDER — CLONIDINE HYDROCHLORIDE 0.1 MG/1
0.2 TABLET ORAL
Status: COMPLETED | OUTPATIENT
Start: 2020-06-04 | End: 2020-06-04

## 2020-06-04 RX ORDER — ONDANSETRON 2 MG/ML
4 INJECTION INTRAMUSCULAR; INTRAVENOUS EVERY 8 HOURS PRN
Status: DISCONTINUED | OUTPATIENT
Start: 2020-06-04 | End: 2020-06-06 | Stop reason: HOSPADM

## 2020-06-04 RX ORDER — ACETAMINOPHEN 325 MG/1
650 TABLET ORAL EVERY 8 HOURS PRN
Status: DISCONTINUED | OUTPATIENT
Start: 2020-06-04 | End: 2020-06-06 | Stop reason: HOSPADM

## 2020-06-04 RX ORDER — PANTOPRAZOLE SODIUM 40 MG/1
40 TABLET, DELAYED RELEASE ORAL DAILY
Status: DISCONTINUED | OUTPATIENT
Start: 2020-06-05 | End: 2020-06-06 | Stop reason: HOSPADM

## 2020-06-04 RX ORDER — SODIUM CHLORIDE 0.9 % (FLUSH) 0.9 %
10 SYRINGE (ML) INJECTION
Status: DISCONTINUED | OUTPATIENT
Start: 2020-06-04 | End: 2020-06-06 | Stop reason: HOSPADM

## 2020-06-04 RX ADMIN — IOHEXOL 30 ML: 350 INJECTION, SOLUTION INTRAVENOUS at 07:06

## 2020-06-04 RX ADMIN — SODIUM CHLORIDE 500 ML: 0.9 INJECTION, SOLUTION INTRAVENOUS at 06:06

## 2020-06-04 RX ADMIN — SODIUM CHLORIDE 1000 ML: 0.9 INJECTION, SOLUTION INTRAVENOUS at 07:06

## 2020-06-04 RX ADMIN — CLONIDINE HYDROCHLORIDE 0.2 MG: 0.1 TABLET ORAL at 06:06

## 2020-06-04 RX ADMIN — SODIUM CHLORIDE: 0.9 INJECTION, SOLUTION INTRAVENOUS at 10:06

## 2020-06-04 RX ADMIN — IOHEXOL 75 ML: 350 INJECTION, SOLUTION INTRAVENOUS at 07:06

## 2020-06-05 LAB
ALBUMIN SERPL BCP-MCNC: 3.7 G/DL (ref 3.5–5.2)
ALP SERPL-CCNC: 73 U/L (ref 55–135)
ALT SERPL W/O P-5'-P-CCNC: 11 U/L (ref 10–44)
ANION GAP SERPL CALC-SCNC: 9 MMOL/L (ref 8–16)
AST SERPL-CCNC: 15 U/L (ref 10–40)
BASOPHILS # BLD AUTO: 0.01 K/UL (ref 0–0.2)
BASOPHILS NFR BLD: 0.2 % (ref 0–1.9)
BILIRUB SERPL-MCNC: 0.4 MG/DL (ref 0.1–1)
BUN SERPL-MCNC: 9 MG/DL (ref 8–23)
CALCIUM SERPL-MCNC: 8.9 MG/DL (ref 8.7–10.5)
CHLORIDE SERPL-SCNC: 103 MMOL/L (ref 95–110)
CO2 SERPL-SCNC: 27 MMOL/L (ref 23–29)
CREAT SERPL-MCNC: 0.6 MG/DL (ref 0.5–1.4)
DIFFERENTIAL METHOD: ABNORMAL
EOSINOPHIL # BLD AUTO: 0 K/UL (ref 0–0.5)
EOSINOPHIL NFR BLD: 0.2 % (ref 0–8)
ERYTHROCYTE [DISTWIDTH] IN BLOOD BY AUTOMATED COUNT: 13.5 % (ref 11.5–14.5)
EST. GFR  (AFRICAN AMERICAN): >60 ML/MIN/1.73 M^2
EST. GFR  (NON AFRICAN AMERICAN): >60 ML/MIN/1.73 M^2
GLUCOSE SERPL-MCNC: 109 MG/DL (ref 70–110)
HCT VFR BLD AUTO: 33.4 % (ref 37–48.5)
HGB BLD-MCNC: 10.8 G/DL (ref 12–16)
IMM GRANULOCYTES # BLD AUTO: 0.05 K/UL (ref 0–0.04)
IMM GRANULOCYTES NFR BLD AUTO: 1.2 % (ref 0–0.5)
LYMPHOCYTES # BLD AUTO: 1.4 K/UL (ref 1–4.8)
LYMPHOCYTES NFR BLD: 35.5 % (ref 18–48)
MCH RBC QN AUTO: 32.1 PG (ref 27–31)
MCHC RBC AUTO-ENTMCNC: 32.3 G/DL (ref 32–36)
MCV RBC AUTO: 99 FL (ref 82–98)
MONOCYTES # BLD AUTO: 0.5 K/UL (ref 0.3–1)
MONOCYTES NFR BLD: 11.2 % (ref 4–15)
NEUTROPHILS # BLD AUTO: 2.1 K/UL (ref 1.8–7.7)
NEUTROPHILS NFR BLD: 51.7 % (ref 38–73)
NRBC BLD-RTO: 0 /100 WBC
OB PNL STL: POSITIVE
PLATELET # BLD AUTO: 155 K/UL (ref 150–350)
PMV BLD AUTO: 9.8 FL (ref 9.2–12.9)
POTASSIUM SERPL-SCNC: 3.3 MMOL/L (ref 3.5–5.1)
PROT SERPL-MCNC: 6.2 G/DL (ref 6–8.4)
RBC # BLD AUTO: 3.36 M/UL (ref 4–5.4)
SODIUM SERPL-SCNC: 139 MMOL/L (ref 136–145)
WBC # BLD AUTO: 4.03 K/UL (ref 3.9–12.7)

## 2020-06-05 PROCEDURE — 25000003 PHARM REV CODE 250: Performed by: NURSE PRACTITIONER

## 2020-06-05 PROCEDURE — 96361 HYDRATE IV INFUSION ADD-ON: CPT

## 2020-06-05 PROCEDURE — 85025 COMPLETE CBC W/AUTO DIFF WBC: CPT

## 2020-06-05 PROCEDURE — 99219 PR INITIAL OBSERVATION CARE,LEVL II: CPT | Mod: ,,, | Performed by: INTERNAL MEDICINE

## 2020-06-05 PROCEDURE — 82272 OCCULT BLD FECES 1-3 TESTS: CPT

## 2020-06-05 PROCEDURE — 25000003 PHARM REV CODE 250: Performed by: SURGERY

## 2020-06-05 PROCEDURE — G0378 HOSPITAL OBSERVATION PER HR: HCPCS | Mod: CS

## 2020-06-05 PROCEDURE — 99219 PR INITIAL OBSERVATION CARE,LEVL II: ICD-10-PCS | Mod: ,,, | Performed by: INTERNAL MEDICINE

## 2020-06-05 PROCEDURE — 80053 COMPREHEN METABOLIC PANEL: CPT

## 2020-06-05 PROCEDURE — 25000003 PHARM REV CODE 250: Performed by: INTERNAL MEDICINE

## 2020-06-05 PROCEDURE — 36415 COLL VENOUS BLD VENIPUNCTURE: CPT

## 2020-06-05 RX ORDER — HYDRALAZINE HYDROCHLORIDE 20 MG/ML
10 INJECTION INTRAMUSCULAR; INTRAVENOUS EVERY 8 HOURS PRN
Status: DISCONTINUED | OUTPATIENT
Start: 2020-06-05 | End: 2020-06-06 | Stop reason: HOSPADM

## 2020-06-05 RX ORDER — GABAPENTIN 300 MG/1
600 CAPSULE ORAL 3 TIMES DAILY
Status: DISCONTINUED | OUTPATIENT
Start: 2020-06-05 | End: 2020-06-06 | Stop reason: HOSPADM

## 2020-06-05 RX ORDER — CLONIDINE HYDROCHLORIDE 0.1 MG/1
0.1 TABLET ORAL NIGHTLY
Status: DISCONTINUED | OUTPATIENT
Start: 2020-06-05 | End: 2020-06-06 | Stop reason: HOSPADM

## 2020-06-05 RX ORDER — METOPROLOL TARTRATE 100 MG/1
100 TABLET ORAL NIGHTLY
Status: DISCONTINUED | OUTPATIENT
Start: 2020-06-05 | End: 2020-06-06 | Stop reason: HOSPADM

## 2020-06-05 RX ORDER — CLONIDINE HYDROCHLORIDE 0.1 MG/1
0.1 TABLET ORAL EVERY 6 HOURS PRN
Status: DISCONTINUED | OUTPATIENT
Start: 2020-06-05 | End: 2020-06-06 | Stop reason: HOSPADM

## 2020-06-05 RX ORDER — HYDROCODONE BITARTRATE AND ACETAMINOPHEN 10; 325 MG/1; MG/1
1 TABLET ORAL EVERY 6 HOURS PRN
Status: DISCONTINUED | OUTPATIENT
Start: 2020-06-05 | End: 2020-06-06 | Stop reason: HOSPADM

## 2020-06-05 RX ORDER — DILTIAZEM HYDROCHLORIDE 120 MG/1
240 CAPSULE, COATED, EXTENDED RELEASE ORAL NIGHTLY
Status: DISCONTINUED | OUTPATIENT
Start: 2020-06-05 | End: 2020-06-06 | Stop reason: HOSPADM

## 2020-06-05 RX ORDER — LOSARTAN POTASSIUM 50 MG/1
100 TABLET ORAL DAILY
Status: DISCONTINUED | OUTPATIENT
Start: 2020-06-05 | End: 2020-06-06 | Stop reason: HOSPADM

## 2020-06-05 RX ORDER — AMITRIPTYLINE HYDROCHLORIDE 25 MG/1
25 TABLET, FILM COATED ORAL NIGHTLY
Status: DISCONTINUED | OUTPATIENT
Start: 2020-06-05 | End: 2020-06-06 | Stop reason: HOSPADM

## 2020-06-05 RX ADMIN — LACTULOSE 20 G: 20 SOLUTION ORAL at 08:06

## 2020-06-05 RX ADMIN — HYDROCODONE BITARTRATE AND ACETAMINOPHEN 1 TABLET: 10; 325 TABLET ORAL at 10:06

## 2020-06-05 RX ADMIN — LOSARTAN POTASSIUM 100 MG: 50 TABLET ORAL at 10:06

## 2020-06-05 RX ADMIN — LACTULOSE 20 G: 20 SOLUTION ORAL at 10:06

## 2020-06-05 RX ADMIN — ACETAMINOPHEN 650 MG: 325 TABLET ORAL at 01:06

## 2020-06-05 RX ADMIN — GABAPENTIN 600 MG: 300 CAPSULE ORAL at 02:06

## 2020-06-05 RX ADMIN — GABAPENTIN 600 MG: 300 CAPSULE ORAL at 08:06

## 2020-06-05 RX ADMIN — HYDROCODONE BITARTRATE AND ACETAMINOPHEN 1 TABLET: 10; 325 TABLET ORAL at 08:06

## 2020-06-05 RX ADMIN — DILTIAZEM HYDROCHLORIDE 240 MG: 120 CAPSULE, COATED, EXTENDED RELEASE ORAL at 08:06

## 2020-06-05 RX ADMIN — LACTULOSE 20 G: 20 SOLUTION ORAL at 02:06

## 2020-06-05 RX ADMIN — CLONIDINE HYDROCHLORIDE 0.1 MG: 0.1 TABLET ORAL at 09:06

## 2020-06-05 RX ADMIN — METOPROLOL TARTRATE 100 MG: 100 TABLET ORAL at 08:06

## 2020-06-05 RX ADMIN — CLONIDINE HYDROCHLORIDE 0.1 MG: 0.1 TABLET ORAL at 12:06

## 2020-06-05 RX ADMIN — AMITRIPTYLINE HYDROCHLORIDE 25 MG: 25 TABLET, FILM COATED ORAL at 08:06

## 2020-06-05 RX ADMIN — PANTOPRAZOLE SODIUM 40 MG: 40 TABLET, DELAYED RELEASE ORAL at 10:06

## 2020-06-05 NOTE — ED PROVIDER NOTES
[facility]                                                 Chief Complaint  86 y.o. female with Abdominal Pain    History of Present Illness  Cheyenne Leach presents to the emergency room with chronic abdominal pain  Patient was seen in another emergency room last night with abdominal pain then  Patient was discharged and came to the ER today stating abdominal pain worse  Patient has nausea vomiting no diarrhea, no fever on ER triage this evening  Patient has had chronic abdominal pain for several years with fibromyalgia issues    The history is provided by the patient   device was not used during this ER visit    Past Medical History   -- Arthritis    -- Bowel obstruction    -- Diverticulitis    -- Fibromyalgia    -- Hyperlipidemia    -- Hypertension    -- Pinched nerve    -- Sciatica      Past Surgical History   -- APPENDECTOMY     -- BACK SURGERY     -- BREAST SURGERY     -- CATARACT EXTRACTION     -- CATARACT EXTRACTION      -- HYSTERECTOMY     -- intestinal obstruction     -- intestinal obstruction     -- PHACOEMULSIFICATION OF CATARACT     -- PHACOEMULSIFICATION OF CATARACT     -- SMALL INTESTINE SURGERY     -- vocal cord injections        Review of patient's allergies   -- Cymbalta [duloxetine]    -- Lyrica  [pregabalin]    -- Ropinirole    -- Statins-hmg-coa reductase inhibitors    -- Sulfa (sulfonamide antibiotics)       I have reviewed all of this patient's past medical, surgical, family, and social   histories as well as active allergies and medications documented in the  electronic medical record    Review of Systems and Physical Exam      Review of Systems  -- Constitution - no fever, denies fatigue, no weakness, no chills  -- Eyes - no tearing or redness, no visual disturbance  -- Ear, Nose - no tinnitus or earache, no nasal congestion or discharge  -- Mouth,Throat - no sore throat, no toothache, normal voice, normal swallowing  -- Respiratory - denies cough  and congestion, no shortness of breath, no HEREDIA  -- Cardiovascular - denies chest pain, no palpitations, denies claudication  -- Gastrointestinal - abdominal pain, nausea, vomiting, no diarrhea  -- Genitourinary - no dysuria, denies flank pain, no hematuria, no STD risk  -- Musculoskeletal - denies back pain, negative for trauma or injury  -- Neurological - no headache, denies weakness or seizure; no LOC  -- Skin - denies pallor, rash, or changes in skin. no hives or welts noted  -- Psychiatric - Denies SI or HI, no psychosis or fractured thought noted     Vital Signs  Her blood pressure is 141/95 and her pulse is 79.   Her respiration is 20 and oxygen saturation is 95%.     Physical Exam  -- Nursing note and vitals reviewed  -- Constitutional: Appears well-developed and well-nourished  -- Head: Atraumatic. Normocephalic. No obvious abnormality  -- Eyes: Pupils are equal and reactive to light. Normal conjunctiva and lids  -- Cardiac: Normal rate, regular rhythm and normal heart sounds  -- Pulmonary: Normal respiratory effort, breath sounds clear to auscultation  -- Abdominal: Soft, no tenderness. Normal bowel sounds. Normal liver edge  -- Genitourinary: no flank pain on exam, no suprapubic pain by palpation   -- Musculoskeletal: Normal range of motion, no effusions. Joints stable   -- Neurological: No focal deficits. Showed good interaction with staff  -- Vascular: Posterior tibial, dorsalis pedis and radial pulses 2+ bilaterally      Emergency Room Course      Lab Results     K 4.0   CL 99   CO2 30 (H)   BUN 16   CREATININE 0.8      ALKPHOS 88   AST 15   ALT 12   BILITOT 0.3   ALBUMIN 4.0   PROT 6.8   WBC 5.04   HGB 11.4 (L)   HCT 35.0 (L)      CPK 49   CPK 49   CPKMB 1.5   TROPONINI 0.014    (H)     Urinalysis  -- Urinalysis performed during this ER visit showed no signs of infection     EKG   -- The EKG findings today were without concerning findings from baseline     CT Abdomen and  Pelvis  Increased distension of the gallbladder and dilatation of the common bile duct to 0.9 cm without evidence of cholelithiasis or choledocholithiasis.  No significant wall thickening or pericholecystic inflammatory change to suggest cholecystitis.  This could be further evaluated with right upper quadrant ultrasound.   Significant stool seen throughout the colon concerning for constipation.     Additional Work up  -- rapid Coronavirus PCR in the emergency room was negative     Medications Given  sodium chloride 0.9% bolus 500 mL (500 mLs Intravenous New Bag 6/4/20 1817)   cloNIDine tablet 0.2 mg (0.2 mg Oral Given 6/4/20 1817)   0.9%  NaCl infusion (1,000 mLs Intravenous New Bag 6/4/20 1942)   iohexol (OMNIPAQUE) oral solution 30 mL (30 mLs Oral Given 6/4/20 1936)   iohexoL (OMNIPAQUE 350) injection 75 mL (75 mLs Intravenous Given 6/4/20 1937)     ED Physician Management  -- Diagnosis management comments: 86 y.o. female with chronic abdominal pain  -- patient with negative white count, normal LFTs, CT scan performed this evening  -- common bile duct dilation with a distended gallbladder, no cholelithiasis noted  -- patient has no previously documented gallbladder disease, no RUQ pain now  -- patient discussed with gastroenterologist Deepak, no LFT elevation noted now  -- patient to be observed here at [facility] with an ultrasound in the morning  -- patient also needs lactulose for severe constipation, on opiates every day  -- Dr. Boyle agrees to take the patient for observation overnight    Diagnosis  [R10.9] Abdominal pain   [K83.1] Common bile duct (CBD) obstruction  [K83.9] Biliary and gallbladder disorder  [K59.00] Constipation, unspecified constipation type     Disposition and Plan  -- Disposition: observation  -- Condition: stable    This note is dictated on M*Modal word recognition program.  There are word recognition mistakes that are occasionally missed on review.         Masood Cha,  MD  06/04/20 [id number]

## 2020-06-05 NOTE — ED NOTES
Patient to 3rd floor per wheelchair. Report given to Jazmin SANTIAGO. Patient holding her purse in route.

## 2020-06-05 NOTE — ASSESSMENT & PLAN NOTE
Home Losartan /12.5mg   Diltiazem CD 240mg   Clonidine 0.1 mg daily   Toresemide 10mg po daily- hold

## 2020-06-05 NOTE — SUBJECTIVE & OBJECTIVE
Past Medical History:   Diagnosis Date    Arthritis     Bowel obstruction 4/6/214    [facility]     Diverticulitis     Fibromyalgia     Hyperlipidemia     Hypertension     Pinched nerve     x 2    Sciatica        Past Surgical History:   Procedure Laterality Date    APPENDECTOMY      BACK SURGERY      lumbar     BREAST SURGERY      lump removed    CATARACT EXTRACTION W/  INTRAOCULAR LENS IMPLANT Left 2/14/2019    Procedure: EXTRACTION, CATARACT, WITH IOL INSERTION;  Surgeon: Herbie Jerry MD;  Location: [facility];  Service: Ophthalmology;  Laterality: Left;    CATARACT EXTRACTION W/  INTRAOCULAR LENS IMPLANT Right 4/11/2019    Procedure: EXTRACTION, CATARACT, WITH IOL INSERTION;  Surgeon: Herbie Jerry MD;  Location: [facility];  Service: Ophthalmology;  Laterality: Right;    HYSTERECTOMY      intestinal obstruction  11/2012    intestinal obstruction  4/6/2014    PHACOEMULSIFICATION OF CATARACT Left 2/14/2019    Procedure: PHACOEMULSIFICATION, CATARACT;  Surgeon: Herbie Jerry MD;  Location: [facility];  Service: Ophthalmology;  Laterality: Left;    PHACOEMULSIFICATION OF CATARACT Right 4/11/2019    Procedure: PHACOEMULSIFICATION, CATARACT;  Surgeon: Herbie Jerry MD;  Location: [facility];  Service: Ophthalmology;  Laterality: Right;    SMALL INTESTINE SURGERY      vocal cord injections         Review of patient's allergies indicates:   Allergen Reactions    Cymbalta [duloxetine] Nausea And Vomiting    Lyrica  [pregabalin]      Other reaction(s): Vomiting    Ropinirole Nausea And Vomiting    Statins-hmg-coa reductase inhibitors      Other reaction(s): Muscle pain    Sulfa (sulfonamide antibiotics) Rash       Current Facility-Administered Medications on File Prior to Encounter   Medication    tetracaine HCl (PF) 0.5 % Drop 1 drop     Current Outpatient Medications on File Prior to Encounter   Medication Sig    amitriptyline (ELAVIL) 25 MG tablet Take 1 tablet (25  mg total) by mouth every evening.    cloNIDine (CATAPRES) 0.1 MG tablet Take 1 tablet (0.1 mg total) by mouth every evening.    diltiaZEM (CARDIZEM CD) 240 MG 24 hr capsule One po nightly for HTN    ergocalciferol (VITAMIN D2) 50,000 unit Cap Take 1 capsule (50,000 Units total) by mouth every 7 days.    gabapentin (NEURONTIN) 300 MG capsule TAKE 2 CAPSULE BY MOUTH BREAKFAST AND LUNCH AND 3 AT BEDTIME    HYDROcodone-acetaminophen (NORCO)  mg per tablet One po q 6 hrs for back pain; opioid medically necessary & exempt for greater then 7 days    HYDROcodone-acetaminophen (NORCO)  mg per tablet Take 1 tablet by mouth every 6 (six) hours as needed. Quantity medically necessary, 7 day limit excluded    [START ON 6/22/2020] HYDROcodone-acetaminophen (NORCO)  mg per tablet Take 1 tablet by mouth every 6 (six) hours as needed. Opioid is medically necessary; 7 day exempt, for chronic pain    levothyroxine (SYNTHROID) 50 MCG tablet levothyroxine 50 mcg tablet   TAKE 1 TABLET BY MOUTH ONCE DAILY.    losartan-hydrochlorothiazide 100-12.5 mg (HYZAAR) 100-12.5 mg Tab losartan 100 mg-hydrochlorothiazide 12.5 mg tablet   TAKE 1 TABLET BY MOUTH ONCE DAILY.    metoprolol tartrate (LOPRESSOR) 100 MG tablet metoprolol tartrate 100 mg tablet   TAKE ONE AT NIGHT FOR HYPERTENSION    NIFEdipine (PROCARDIA-XL) 30 MG (OSM) 24 hr tablet nifedipine ER 30 mg tablet,extended release 24 hr   TAKE 1 TABLET BY MOUTH ONCE DAILY    omeprazole (PRILOSEC) 20 MG capsule Take 1 capsule (20 mg total) by mouth once daily. 1 Capsule(s) Oral PRN Every day.    pantoprazole (PROTONIX) 40 MG tablet Take 40 mg by mouth once daily.    sucralfate (CARAFATE) 1 gram tablet Dissolve in 10 cc and drink before meals and bedtime    torsemide (DEMADEX) 10 MG Tab torsemide 10 mg tablet   TAKE 1 TABLET (10 MG TOTAL) BY MOUTH ONCE DAILY.    diclofenac sodium (VOLTAREN) 1 % Gel APPLY TOPICALLY FOUR TIMES DAILY AS NEEDED    levothyroxine  "(SYNTHROID) 75 MCG tablet TAKE 1 TABLET (75 MCG TOTAL) BY MOUTH ONCE DAILY. (Patient not taking: Reported on 3/12/2020)    ondansetron (ZOFRAN) 4 MG tablet Take 1 tablet (4 mg total) by mouth every 6 (six) hours as needed.     Family History     Problem Relation (Age of Onset)    Cancer Mother    Tuberculosis Father        Tobacco Use    Smoking status: Never Smoker    Smokeless tobacco: Never Used   Substance and Sexual Activity    Alcohol use: No    Drug use: No    Sexual activity: Never     Review of Systems   Constitutional: Negative for chills, fatigue and fever.   HENT: Negative for congestion, ear pain, postnasal drip, sinus pressure and sore throat.    Eyes: Negative for discharge.   Respiratory: Negative for cough, chest tightness and shortness of breath.    Cardiovascular: Negative.    Gastrointestinal: Positive for abdominal pain, blood in stool ("black" per patient; unsure if truly blood), nausea and vomiting. Negative for constipation and diarrhea.   Genitourinary: Negative for difficulty urinating, flank pain and hematuria.   Musculoskeletal: Positive for arthralgias and myalgias. Negative for joint swelling.   Skin: Negative.    Neurological: Negative for dizziness and headaches.   Psychiatric/Behavioral: Negative for behavioral problems and confusion.     Objective:     Vital Signs (Most Recent):  Temp: 97.1 °F (36.2 °C) (06/05/20 0708)  Pulse: 79 (06/05/20 0708)  Resp: 18 (06/05/20 0708)  BP: (!) 170/90 (06/05/20 0708)  SpO2: (!) 94 % (06/05/20 0708) Vital Signs (24h Range):  Temp:  [97 °F (36.1 °C)-99.8 °F (37.7 °C)] 97.1 °F (36.2 °C)  Pulse:  [68-90] 79  Resp:  [16-20] 18  SpO2:  [94 %-97 %] 94 %  BP: (140-214)/() 170/90     Weight: 62.2 kg (137 lb 2 oz)  Body mass index is 24.29 kg/m².    Physical Exam   Constitutional: She is oriented to person, place, and time. She appears well-developed and well-nourished. No distress.   HENT:   Head: Normocephalic and atraumatic.   Right Ear: " External ear normal.   Left Ear: External ear normal.   Eyes: Pupils are equal, round, and reactive to light. Conjunctivae and EOM are normal. Right eye exhibits no discharge. Left eye exhibits no discharge.   Neck: Neck supple. No tracheal deviation present.   Cardiovascular: Normal rate and regular rhythm. Exam reveals no gallop and no friction rub.   No murmur heard.  Pulmonary/Chest: Effort normal and breath sounds normal. No respiratory distress. She has no wheezes. She has no rales.   Abdominal: Soft. Bowel sounds are normal. She exhibits no distension. There is tenderness (diffuse).   Neurological: She is alert and oriented to person, place, and time. No cranial nerve deficit.   Skin: Skin is warm and dry.   Psychiatric: She has a normal mood and affect. Her behavior is normal.   Nursing note and vitals reviewed.        CRANIAL NERVES     CN III, IV, VI   Pupils are equal, round, and reactive to light.  Extraocular motions are normal.        Significant Labs:   A1C: No results for input(s): HGBA1C in the last 4320 hours.  Bilirubin:   Recent Labs   Lab 06/04/20 1803 06/05/20  0604   BILITOT 0.3 0.4     Blood Culture: No results for input(s): LABBLOO in the last 48 hours.  CBC:   Recent Labs   Lab 06/04/20 1803 06/05/20  0604   WBC 5.04 4.03   HGB 11.4* 10.8*   HCT 35.0* 33.4*    155     CMP:   Recent Labs   Lab 06/04/20 1803 06/05/20  0604    139   K 4.0 3.3*   CL 99 103   CO2 30* 27    109   BUN 16 9   CREATININE 0.8 0.6   CALCIUM 9.7 8.9   PROT 6.8 6.2   ALBUMIN 4.0 3.7   BILITOT 0.3 0.4   ALKPHOS 88 73   AST 15 15   ALT 12 11   ANIONGAP 11 9   EGFRNONAA >60 >60     Cardiac Markers:   Recent Labs   Lab 06/04/20  1803   *     Coagulation: No results for input(s): PT, INR, APTT in the last 48 hours.  Lactic Acid: No results for input(s): LACTATE in the last 48 hours.  Magnesium: No results for input(s): MG in the last 48 hours.  Troponin:   Recent Labs   Lab 06/04/20  1803    TROPONINI 0.014     TSH: No results for input(s): TSH in the last 4320 hours.  Urine Culture: No results for input(s): LABURIN in the last 48 hours.  Urine Studies:   Recent Labs   Lab 06/04/20  1812   COLORU [place]*   APPEARANCEUA Clear   PHUR >8.0   SPECGRAV 1.015   PROTEINUA 1+*   GLUCUA Trace*   KETONESU Trace*   BILIRUBINUA Negative   OCCULTUA Trace*   NITRITE Positive*   UROBILINOGEN 1.0   LEUKOCYTESUR Negative   RBCUA 10*   WBCUA 1   BACTERIA None   HYALINECASTS 0     All pertinent labs within the past 24 hours have been reviewed.    Significant Imaging: I have reviewed all pertinent imaging results/findings within the past 24 hours.     6/4/2020 CT of abd   Increased distension of the gallbladder and dilatation of the common bile duct to 0.9 cm without evidence of cholelithiasis or choledocholithiasis.  No significant wall thickening or pericholecystic inflammatory change to suggest cholecystitis.  This could be further evaluated with right upper quadrant ultrasound.  Significant stool seen throughout the colon concerning for constipation.  Moderate-sized hiatal hernia.  Significant anterolisthesis L5 on S1 with L2-3 L5 fusion.

## 2020-06-05 NOTE — NURSING
Patient transported to 3rd floor room 309. NADN. Care assumed after report received from PAMELA Sidhu. Patient is AAOx3. No complaints of chest pain. Patient oriented to room, bed in low position, side rails up x2, call bell in reach. Will continue to monitor.

## 2020-06-05 NOTE — ASSESSMENT & PLAN NOTE
Increased distension of the gallbladder and dilatation of the common bile duct to 0.9 cm without evidence of cholelithiasis or choledocholithiasis.  No significant wall thickening or pericholecystic inflammatory change to suggest cholecystitis.    Await abd US   Suspect pain from chronic constipation, lactulose after US today will hopefully resolve  She think she passed blood in her stool (black) so will check occult but VSS and H/H stable. Low concern for active GI bleeding

## 2020-06-05 NOTE — H&P
"[facility] Medicine  History & Physical    Patient Name: Cheyenne Leach  MRN: [id number]  Admission Date: 6/4/2020  Attending Physician: Stacey Boyle MD   Primary Care Provider: Tomasz Lynch MD         Patient information was obtained from patient and ER records.     Subjective:     Principal Problem:Abdominal pain    Chief Complaint:   Chief Complaint   Patient presents with    Abdominal Pain        HPI: Cheyenne Leach is a 86 y.o. female  with PMHX of HTN, HLD, Fibromyalgia, Bowel obstruction, and diverticulitis presents to the emergency room with chronic abdominal pain> pt reporting she vomited a " pan of old black blood" a few nights ago and having black stools. None since admission last PM   Patient was seen in another emergency room night before with abdominal pain. Pt tx and was discharged but returned to ER yesterday stating abdominal pain worse.   + C/o nausea vomiting no diarrhea, no fever on ER triage. Patient has had chronic abdominal pain for several years with fibromyalgia issues  CT of abd demonstrating - Increased distension of the gallbladder and dilatation of the common bile duct to 0.9 cm without evidence of cholelithiasis or choledocholithiasis.  No significant wall thickening or pericholecystic inflammatory change to suggest cholecystitis.  This could be further evaluated with right upper quadrant ultrasound.Significant stool seen throughout the colon concerning for constipation.  Scheduled for GB US today . Follows with [facility] ,   Getting IVFs @ 125ml/hr, /90       Past Medical History:   Diagnosis Date    Arthritis     Bowel obstruction 4/6/214    [facility]     Diverticulitis     Fibromyalgia     Hyperlipidemia     Hypertension     Pinched nerve     x 2    Sciatica        Past Surgical History:   Procedure Laterality Date    APPENDECTOMY      BACK SURGERY      lumbar     BREAST SURGERY      lump removed    CATARACT " EXTRACTION W/  INTRAOCULAR LENS IMPLANT Left 2/14/2019    Procedure: EXTRACTION, CATARACT, WITH IOL INSERTION;  Surgeon: Herbie Jerry MD;  Location: [facility];  Service: Ophthalmology;  Laterality: Left;    CATARACT EXTRACTION W/  INTRAOCULAR LENS IMPLANT Right 4/11/2019    Procedure: EXTRACTION, CATARACT, WITH IOL INSERTION;  Surgeon: Herbie Jerry MD;  Location: [facility];  Service: Ophthalmology;  Laterality: Right;    HYSTERECTOMY      intestinal obstruction  11/2012    intestinal obstruction  4/6/2014    PHACOEMULSIFICATION OF CATARACT Left 2/14/2019    Procedure: PHACOEMULSIFICATION, CATARACT;  Surgeon: Herbie Jerry MD;  Location: [facility];  Service: Ophthalmology;  Laterality: Left;    PHACOEMULSIFICATION OF CATARACT Right 4/11/2019    Procedure: PHACOEMULSIFICATION, CATARACT;  Surgeon: Herbie Jerry MD;  Location: [facility];  Service: Ophthalmology;  Laterality: Right;    SMALL INTESTINE SURGERY      vocal cord injections         Review of patient's allergies indicates:   Allergen Reactions    Cymbalta [duloxetine] Nausea And Vomiting    Lyrica  [pregabalin]      Other reaction(s): Vomiting    Ropinirole Nausea And Vomiting    Statins-hmg-coa reductase inhibitors      Other reaction(s): Muscle pain    Sulfa (sulfonamide antibiotics) Rash       Current Facility-Administered Medications on File Prior to Encounter   Medication    tetracaine HCl (PF) 0.5 % Drop 1 drop     Current Outpatient Medications on File Prior to Encounter   Medication Sig    amitriptyline (ELAVIL) 25 MG tablet Take 1 tablet (25 mg total) by mouth every evening.    cloNIDine (CATAPRES) 0.1 MG tablet Take 1 tablet (0.1 mg total) by mouth every evening.    diltiaZEM (CARDIZEM CD) 240 MG 24 hr capsule One po nightly for HTN    ergocalciferol (VITAMIN D2) 50,000 unit Cap Take 1 capsule (50,000 Units total) by mouth every 7 days.    gabapentin (NEURONTIN) 300 MG capsule TAKE 2 CAPSULE BY MOUTH BREAKFAST  AND LUNCH AND 3 AT BEDTIME    HYDROcodone-acetaminophen (NORCO)  mg per tablet One po q 6 hrs for back pain; opioid medically necessary & exempt for greater then 7 days    HYDROcodone-acetaminophen (NORCO)  mg per tablet Take 1 tablet by mouth every 6 (six) hours as needed. Quantity medically necessary, 7 day limit excluded    [START ON 6/22/2020] HYDROcodone-acetaminophen (NORCO)  mg per tablet Take 1 tablet by mouth every 6 (six) hours as needed. Opioid is medically necessary; 7 day exempt, for chronic pain    levothyroxine (SYNTHROID) 50 MCG tablet levothyroxine 50 mcg tablet   TAKE 1 TABLET BY MOUTH ONCE DAILY.    losartan-hydrochlorothiazide 100-12.5 mg (HYZAAR) 100-12.5 mg Tab losartan 100 mg-hydrochlorothiazide 12.5 mg tablet   TAKE 1 TABLET BY MOUTH ONCE DAILY.    metoprolol tartrate (LOPRESSOR) 100 MG tablet metoprolol tartrate 100 mg tablet   TAKE ONE AT NIGHT FOR HYPERTENSION    NIFEdipine (PROCARDIA-XL) 30 MG (OSM) 24 hr tablet nifedipine ER 30 mg tablet,extended release 24 hr   TAKE 1 TABLET BY MOUTH ONCE DAILY    omeprazole (PRILOSEC) 20 MG capsule Take 1 capsule (20 mg total) by mouth once daily. 1 Capsule(s) Oral PRN Every day.    pantoprazole (PROTONIX) 40 MG tablet Take 40 mg by mouth once daily.    sucralfate (CARAFATE) 1 gram tablet Dissolve in 10 cc and drink before meals and bedtime    torsemide (DEMADEX) 10 MG Tab torsemide 10 mg tablet   TAKE 1 TABLET (10 MG TOTAL) BY MOUTH ONCE DAILY.    diclofenac sodium (VOLTAREN) 1 % Gel APPLY TOPICALLY FOUR TIMES DAILY AS NEEDED    levothyroxine (SYNTHROID) 75 MCG tablet TAKE 1 TABLET (75 MCG TOTAL) BY MOUTH ONCE DAILY. (Patient not taking: Reported on 3/12/2020)    ondansetron (ZOFRAN) 4 MG tablet Take 1 tablet (4 mg total) by mouth every 6 (six) hours as needed.     Family History     Problem Relation (Age of Onset)    Cancer Mother    Tuberculosis Father        Tobacco Use    Smoking status: Never Smoker    Smokeless  "tobacco: Never Used   Substance and Sexual Activity    Alcohol use: No    Drug use: No    Sexual activity: Never     Review of Systems   Constitutional: Negative for chills, fatigue and fever.   HENT: Negative for congestion, ear pain, postnasal drip, sinus pressure and sore throat.    Eyes: Negative for discharge.   Respiratory: Negative for cough, chest tightness and shortness of breath.    Cardiovascular: Negative.    Gastrointestinal: Positive for abdominal pain, blood in stool ("black" per patient; unsure if truly blood), nausea and vomiting. Negative for constipation and diarrhea.   Genitourinary: Negative for difficulty urinating, flank pain and hematuria.   Musculoskeletal: Positive for arthralgias and myalgias. Negative for joint swelling.   Skin: Negative.    Neurological: Negative for dizziness and headaches.   Psychiatric/Behavioral: Negative for behavioral problems and confusion.     Objective:     Vital Signs (Most Recent):  Temp: 97.1 °F (36.2 °C) (06/05/20 0708)  Pulse: 79 (06/05/20 0708)  Resp: 18 (06/05/20 0708)  BP: (!) 170/90 (06/05/20 0708)  SpO2: (!) 94 % (06/05/20 0708) Vital Signs (24h Range):  Temp:  [97 °F (36.1 °C)-99.8 °F (37.7 °C)] 97.1 °F (36.2 °C)  Pulse:  [68-90] 79  Resp:  [16-20] 18  SpO2:  [94 %-97 %] 94 %  BP: (140-214)/() 170/90     Weight: 62.2 kg (137 lb 2 oz)  Body mass index is 24.29 kg/m².    Physical Exam   Constitutional: She is oriented to person, place, and time. She appears well-developed and well-nourished. No distress.   HENT:   Head: Normocephalic and atraumatic.   Right Ear: External ear normal.   Left Ear: External ear normal.   Eyes: Pupils are equal, round, and reactive to light. Conjunctivae and EOM are normal. Right eye exhibits no discharge. Left eye exhibits no discharge.   Neck: Neck supple. No tracheal deviation present.   Cardiovascular: Normal rate and regular rhythm. Exam reveals no gallop and no friction rub.   No murmur " heard.  Pulmonary/Chest: Effort normal and breath sounds normal. No respiratory distress. She has no wheezes. She has no rales.   Abdominal: Soft. Bowel sounds are normal. She exhibits no distension. There is tenderness (diffuse).   Neurological: She is alert and oriented to person, place, and time. No cranial nerve deficit.   Skin: Skin is warm and dry.   Psychiatric: She has a normal mood and affect. Her behavior is normal.   Nursing note and vitals reviewed.        CRANIAL NERVES     CN III, IV, VI   Pupils are equal, round, and reactive to light.  Extraocular motions are normal.        Significant Labs:   A1C: No results for input(s): HGBA1C in the last 4320 hours.  Bilirubin:   Recent Labs   Lab 06/04/20 1803 06/05/20  0604   BILITOT 0.3 0.4     Blood Culture: No results for input(s): LABBLOO in the last 48 hours.  CBC:   Recent Labs   Lab 06/04/20 1803 06/05/20  0604   WBC 5.04 4.03   HGB 11.4* 10.8*   HCT 35.0* 33.4*    155     CMP:   Recent Labs   Lab 06/04/20 1803 06/05/20  0604    139   K 4.0 3.3*   CL 99 103   CO2 30* 27    109   BUN 16 9   CREATININE 0.8 0.6   CALCIUM 9.7 8.9   PROT 6.8 6.2   ALBUMIN 4.0 3.7   BILITOT 0.3 0.4   ALKPHOS 88 73   AST 15 15   ALT 12 11   ANIONGAP 11 9   EGFRNONAA >60 >60     Cardiac Markers:   Recent Labs   Lab 06/04/20  1803   *     Coagulation: No results for input(s): PT, INR, APTT in the last 48 hours.  Lactic Acid: No results for input(s): LACTATE in the last 48 hours.  Magnesium: No results for input(s): MG in the last 48 hours.  Troponin:   Recent Labs   Lab 06/04/20  1803   TROPONINI 0.014     TSH: No results for input(s): TSH in the last 4320 hours.  Urine Culture: No results for input(s): LABURIN in the last 48 hours.  Urine Studies:   Recent Labs   Lab 06/04/20  1812   COLORU [place]*   APPEARANCEUA Clear   PHUR >8.0   SPECGRAV 1.015   PROTEINUA 1+*   GLUCUA Trace*   KETONESU Trace*   BILIRUBINUA Negative   OCCULTUA Trace*   NITRITE  Positive*   UROBILINOGEN 1.0   LEUKOCYTESUR Negative   RBCUA 10*   WBCUA 1   BACTERIA None   HYALINECASTS 0     All pertinent labs within the past 24 hours have been reviewed.    Significant Imaging: I have reviewed all pertinent imaging results/findings within the past 24 hours.     6/4/2020 CT of abd   Increased distension of the gallbladder and dilatation of the common bile duct to 0.9 cm without evidence of cholelithiasis or choledocholithiasis.  No significant wall thickening or pericholecystic inflammatory change to suggest cholecystitis.  This could be further evaluated with right upper quadrant ultrasound.  Significant stool seen throughout the colon concerning for constipation.  Moderate-sized hiatal hernia.  Significant anterolisthesis L5 on S1 with L2-3 L5 fusion.    Assessment/Plan:     * Abdominal pain  Increased distension of the gallbladder and dilatation of the common bile duct to 0.9 cm without evidence of cholelithiasis or choledocholithiasis.  No significant wall thickening or pericholecystic inflammatory change to suggest cholecystitis.    Await abd US   Suspect pain from chronic constipation, lactulose after US today will hopefully resolve  She think she passed blood in her stool (black) so will check occult but VSS and H/H stable. Low concern for active GI bleeding      Chronic constipation  Takes chronic pain meds   Lactulose after US   Likely the cause of her pain      Hypertension  Home Losartan /12.5mg   Diltiazem CD 240mg   Clonidine 0.1 mg daily   Toresemide 10mg po daily- hold       Hypothyroidism  Continue synthroid 50mcg daily       Chronic pain  May be being over medicated. She reports intense pain but these medications are high dose for her age and obviously causing constipation issues. Consider cymbalta outpatient. May benefit from pain managemt eval outpatient as well      Fibromyalgia  Takes neurontin 600mg q am, /600mg at noon/900mg at bedtime   Hydrocodone prn       May  be being over medicated. She reports intense pain but these medications are high dose for her age and obviously causing constipation issues. Consider cymbalta outpatient.         VTE Risk Mitigation (From admission, onward)         Ordered     IP VTE HIGH RISK PATIENT  Once      06/04/20 2203     Place sequential compression device  Until discontinued      06/04/20 2203                   Mayuri Mcdonald MD  Department of Hospital Medicine   [facility]

## 2020-06-05 NOTE — ASSESSMENT & PLAN NOTE
Takes neurontin 600mg q am, /600mg at noon/900mg at bedtime   Hydrocodone prn       May be being over medicated. She reports intense pain but these medications are high dose for her age and obviously causing constipation issues. Consider cymbalta outpatient.

## 2020-06-05 NOTE — PLAN OF CARE
06/05/20 1023   Discharge Assessment   Assessment Type Discharge Planning Assessment   Confirmed/corrected address and phone number on facesheet? Yes   Assessment information obtained from? Patient   Prior to hospitilization cognitive status: Alert/Oriented   Prior to hospitalization functional status: Assistive Equipment   Current cognitive status: Alert/Oriented   Current Functional Status: Assistive Equipment   Facility Arrived From: Home   Lives With alone   Able to Return to Prior Arrangements yes   Is patient able to care for self after discharge? Yes   Who are your caregiver(s) and their phone number(s)? Tonja Jose Antonio (Friend) [phone]   Patient's perception of discharge disposition home or selfcare   Readmission Within the Last 30 Days no previous admission in last 30 days   Patient currently being followed by outpatient case management? No   Patient currently receives any other outside agency services? No   Equipment Currently Used at Home bedside commode;shower chair;wheelchair;walker, rolling   Do you have any problems affording any of your prescribed medications? No   Does the patient have transportation home? Yes   Transportation Anticipated family or friend will provide   Discharge Plan A Home with family   Discharge Plan B Home with family;Home Health   DME Needed Upon Discharge  none   Patient/Family in Agreement with Plan yes       Discharge assessment completed with patient. No post-acute care needs identified at this time. SW to continue to monitor needs throughout hospital stay.     Nelly Currie LMSW

## 2020-06-05 NOTE — ASSESSMENT & PLAN NOTE
May be being over medicated. She reports intense pain but these medications are high dose for her age and obviously causing constipation issues. Consider cymbalta outpatient. May benefit from pain managemt eval outpatient as well

## 2020-06-05 NOTE — PLAN OF CARE
06/05/20 1427   ESQUIVEL Message   Medicare Outpatient and Observation Notification regarding financial responsibility Given to patient/caregiver;Explained to patient/caregiver;Signed/date by patient/caregiver   Date ESQUIVEL was signed 06/05/20   Time ESQUIVEL was signed 0910

## 2020-06-05 NOTE — PLAN OF CARE
Pt admitted with abdominal pain; CT abdomen and pelvis and GB US performed. Gallbladder WNL; CT shows constipation. Pt with known history of constipation and obstructions. Lactulose administered x2 today with good results. Stool collected and positive for occult blood. H&H stable; will recheck in AM. Pt states some relief of abdominal pain after BMs. PRN pain medications ordered. BP elevated this morning; now more stable since administering scheduled and PRN medication. Bowel sounds hypoactive. Started on clear liquid diet this evening; tolerating well; denies nausea and vomiting. Sinus rhythm on telemetry. Remains free from injury/falls. Reviewed plan of care with pt; states agreement.

## 2020-06-05 NOTE — ED NOTES
Assumed care of patient. Patient with even and unlabored respirations. C/o left sided abdominal pain. Patient unable to rate on the pain scale. Call bell in reach, instructed patient to call for needs.

## 2020-06-05 NOTE — ED NOTES
Patient with even and unlabored respirations, call bell in reach. Instructed patient to call for needs.

## 2020-06-05 NOTE — HPI
"Cheyenne Leach is a 86 y.o. female with PMHX of HTN, HLD, Fibromyalgia, Bowel obstruction, and diverticulitis presents to the emergency room with chronic abdominal pain> pt reporting she vomited a " pan of old black blood" a few nights ago and having black stools. None since admission last PM   Patient was seen in another emergency room night before with abdominal pain. Pt tx and was discharged but returned to ER yesterday stating abdominal pain worse.   + C/o nausea vomiting no diarrhea, no fever on ER triage. Patient has had chronic abdominal pain for several years with fibromyalgia issues  CT of abd demonstrating - Increased distension of the gallbladder and dilatation of the common bile duct to 0.9 cm without evidence of cholelithiasis or choledocholithiasis.  No significant wall thickening or pericholecystic inflammatory change to suggest cholecystitis.  This could be further evaluated with right upper quadrant ultrasound.Significant stool seen throughout the colon concerning for constipation.  Scheduled for GB US today . Follows with [facility] ,   Getting IVFs @ 125ml/hr, /90     "

## 2020-06-05 NOTE — PLAN OF CARE
06/05/20 1024   Advance Directives (For Healthcare)   Advance Directive  (If Adv Dir status is received, view document under Adv Dir in header or Chart Review Media tab) Advance Directive currently in Epic.       Patient has a healthcare POA listed in [facility].     Advance Care Planning     Living Will  During this visit, I engaged the patient  in the advance care planning process.  The patient and I reviewed the role for advance directives and their purpose in directing future healthcare if the patient's unable to speak for him/herself.  At this point in time, the patient does have full decision-making capacity. The patient communicated that if she were comatose and had little chance of a meaningful recovery, she would not want machines/life-sustaining treatments used. The patient has completed a living will to reflect these preferences.          Code Status  In light of the patients advanced and life limiting illness,I engaged the the patient in a conversation about the patient's preferences for care  at the very end of life. The patient wishes to have a natural, peaceful death.  Along those lines, the patient does not wish to have CPR or other invasive treatments performed when her heart and/or breathing stops. I communicated to the patient that a DNR form was completed and will be scanned into [employer]. Dr. Mcdonald to order DNR.    Nelly Currie LMSW

## 2020-06-05 NOTE — PLAN OF CARE
Ms Leach is here with abdominal pain. She has no post acute care needs at this time. CM contact information and discharge brochure checklist reviewed with patient and all questions answered. Discharge information sheet for constipation given including signs and symptoms indicating return to ED or call to PCP. Compliance and understanding voiced.

## 2020-06-06 LAB
ALBUMIN SERPL BCP-MCNC: 3.7 G/DL (ref 3.5–5.2)
ALP SERPL-CCNC: 69 U/L (ref 55–135)
ALT SERPL W/O P-5'-P-CCNC: 14 U/L (ref 10–44)
ANION GAP SERPL CALC-SCNC: 12 MMOL/L (ref 8–16)
AST SERPL-CCNC: 16 U/L (ref 10–40)
BASOPHILS # BLD AUTO: 0.01 K/UL (ref 0–0.2)
BASOPHILS NFR BLD: 0.2 % (ref 0–1.9)
BILIRUB SERPL-MCNC: 0.7 MG/DL (ref 0.1–1)
BUN SERPL-MCNC: 9 MG/DL (ref 8–23)
CALCIUM SERPL-MCNC: 8.8 MG/DL (ref 8.7–10.5)
CHLORIDE SERPL-SCNC: 100 MMOL/L (ref 95–110)
CO2 SERPL-SCNC: 26 MMOL/L (ref 23–29)
CREAT SERPL-MCNC: 0.8 MG/DL (ref 0.5–1.4)
DIFFERENTIAL METHOD: ABNORMAL
EOSINOPHIL # BLD AUTO: 0 K/UL (ref 0–0.5)
EOSINOPHIL NFR BLD: 0.4 % (ref 0–8)
ERYTHROCYTE [DISTWIDTH] IN BLOOD BY AUTOMATED COUNT: 13.5 % (ref 11.5–14.5)
EST. GFR  (AFRICAN AMERICAN): >60 ML/MIN/1.73 M^2
EST. GFR  (NON AFRICAN AMERICAN): >60 ML/MIN/1.73 M^2
GLUCOSE SERPL-MCNC: 105 MG/DL (ref 70–110)
HCT VFR BLD AUTO: 34.9 % (ref 37–48.5)
HGB BLD-MCNC: 11.2 G/DL (ref 12–16)
IMM GRANULOCYTES # BLD AUTO: 0.08 K/UL (ref 0–0.04)
IMM GRANULOCYTES NFR BLD AUTO: 1.6 % (ref 0–0.5)
LYMPHOCYTES # BLD AUTO: 1.9 K/UL (ref 1–4.8)
LYMPHOCYTES NFR BLD: 37.4 % (ref 18–48)
MCH RBC QN AUTO: 31.9 PG (ref 27–31)
MCHC RBC AUTO-ENTMCNC: 32.1 G/DL (ref 32–36)
MCV RBC AUTO: 99 FL (ref 82–98)
MONOCYTES # BLD AUTO: 0.6 K/UL (ref 0.3–1)
MONOCYTES NFR BLD: 12.3 % (ref 4–15)
NEUTROPHILS # BLD AUTO: 2.4 K/UL (ref 1.8–7.7)
NEUTROPHILS NFR BLD: 48.1 % (ref 38–73)
NRBC BLD-RTO: 0 /100 WBC
PLATELET # BLD AUTO: 184 K/UL (ref 150–350)
PMV BLD AUTO: 10.1 FL (ref 9.2–12.9)
POTASSIUM SERPL-SCNC: 3.1 MMOL/L (ref 3.5–5.1)
PROT SERPL-MCNC: 6 G/DL (ref 6–8.4)
RBC # BLD AUTO: 3.51 M/UL (ref 4–5.4)
SODIUM SERPL-SCNC: 138 MMOL/L (ref 136–145)
WBC # BLD AUTO: 4.94 K/UL (ref 3.9–12.7)

## 2020-06-06 PROCEDURE — 36415 COLL VENOUS BLD VENIPUNCTURE: CPT

## 2020-06-06 PROCEDURE — G0378 HOSPITAL OBSERVATION PER HR: HCPCS

## 2020-06-06 PROCEDURE — 25000003 PHARM REV CODE 250: Performed by: NURSE PRACTITIONER

## 2020-06-06 PROCEDURE — 99225 PR SUBSEQUENT OBSERVATION CARE,LEVEL II: ICD-10-PCS | Mod: ,,, | Performed by: INTERNAL MEDICINE

## 2020-06-06 PROCEDURE — 99225 PR SUBSEQUENT OBSERVATION CARE,LEVEL II: CPT | Mod: ,,, | Performed by: INTERNAL MEDICINE

## 2020-06-06 PROCEDURE — 25000003 PHARM REV CODE 250: Performed by: SURGERY

## 2020-06-06 PROCEDURE — 25000003 PHARM REV CODE 250: Performed by: INTERNAL MEDICINE

## 2020-06-06 PROCEDURE — 85025 COMPLETE CBC W/AUTO DIFF WBC: CPT

## 2020-06-06 PROCEDURE — 80053 COMPREHEN METABOLIC PANEL: CPT

## 2020-06-06 RX ORDER — POTASSIUM CHLORIDE 20 MEQ/1
40 TABLET, EXTENDED RELEASE ORAL ONCE
Status: COMPLETED | OUTPATIENT
Start: 2020-06-06 | End: 2020-06-06

## 2020-06-06 RX ORDER — LACTULOSE 10 G/15ML
20 SOLUTION ORAL 3 TIMES DAILY PRN
Qty: 900 ML | Refills: 0 | Status: SHIPPED | OUTPATIENT
Start: 2020-06-06 | End: 2020-06-16

## 2020-06-06 RX ADMIN — LOSARTAN POTASSIUM 100 MG: 50 TABLET ORAL at 08:06

## 2020-06-06 RX ADMIN — GABAPENTIN 600 MG: 300 CAPSULE ORAL at 08:06

## 2020-06-06 RX ADMIN — POTASSIUM CHLORIDE 40 MEQ: 1500 TABLET, EXTENDED RELEASE ORAL at 08:06

## 2020-06-06 RX ADMIN — PANTOPRAZOLE SODIUM 40 MG: 40 TABLET, DELAYED RELEASE ORAL at 08:06

## 2020-06-06 NOTE — NURSING
Pt in stable condition. States relief of constipation after multiple BMs since admit. Discharged home. Reviewed discharge instructions, follow up appts, medications and reportable signs and symptoms with pt; states understanding.

## 2020-06-06 NOTE — PLAN OF CARE
"Patient Agrees and Compliant with Plan of Care:  Maintain Pain Regimen; Patient received Hydrocodone X1 this shift for c/o abd. Pain with relief.  Monitor Vital Signs; Vital Signs stable this shift. No PRN   Monitor BM's ; Note patient had several loose "Brown" BM's this shift. No bloody stools noted.  Maintain Clear liquid diet; No c/o nausea/or emesis noted this shifty.  Monitor Lab Values; Last H/H drawn on 6-5-20 was Hgb. 10; Hct. 32.4. Potassium was 3.3; Repeat labs this am.  Monitor Cardiac Rhythm; Patient in NSR .  Falls Precautions; Maintain Patient Safety; Patient assisted up to Commode several times tonight; Bed Alarm in use. No falls or injury noted this shift. Will continue to monitor.    "

## 2020-06-06 NOTE — PLAN OF CARE
06/06/20 1046   Final Note   Assessment Type Final Discharge Note   Anticipated Discharge Disposition Home   What phone number can be called within the next 1-3 days to see how you are doing after discharge? [phone]   Hospital Follow Up  Appt(s) scheduled?   (staff nurse will do)   Discharge plans and expectations educations in teach back method with documentation complete? Yes   Right Care Referral Info   Post Acute Recommendation No Care   Referral Type No care   Post-Acute Status   Post-Acute Authorization Other   Other Status No Post-Acute Service Needs   Discharge Delays None known at this time       Patient will discharge home today. There are no post acute needs identified this admission.

## 2020-06-06 NOTE — ASSESSMENT & PLAN NOTE
Increased distension of the gallbladder and dilatation of the common bile duct to 0.9 cm without evidence of cholelithiasis or choledocholithiasis.  No significant wall thickening or pericholecystic inflammatory change to suggest cholecystitis.    Repeat abd US WNL for patient  Suspect pain from chronic constipation, lactulose resolved  Home today with lactulose for PRN use at home. Strongly encouraged to cut back on narcotic pain medications as this is making her sx worse.

## 2020-06-06 NOTE — DISCHARGE SUMMARY
"[facility] Hospital Medicine  Discharge Summary      Patient Name: Cheyenne Leach  MRN: [id number]  Admission Date: 6/4/2020  Hospital Length of Stay: 0 days  Discharge Date and Time:  06/06/2020 10:09 AM  Attending Physician: Stacey Boyle MD   Discharging Provider: Mayuri Mcdonald MD  Primary Care Provider: Tomasz Lynch MD      HPI:   Cheyenne Leach is a 86 y.o. female  with PMHX of HTN, HLD, Fibromyalgia, Bowel obstruction, and diverticulitis presents to the emergency room with chronic abdominal pain> pt reporting she vomited a " pan of old black blood" a few nights ago and having black stools. None since admission last PM   Patient was seen in another emergency room night before with abdominal pain. Pt tx and was discharged but returned to ER yesterday stating abdominal pain worse.   + C/o nausea vomiting no diarrhea, no fever on ER triage. Patient has had chronic abdominal pain for several years with fibromyalgia issues  CT of abd demonstrating - Increased distension of the gallbladder and dilatation of the common bile duct to 0.9 cm without evidence of cholelithiasis or choledocholithiasis.  No significant wall thickening or pericholecystic inflammatory change to suggest cholecystitis.  This could be further evaluated with right upper quadrant ultrasound.Significant stool seen throughout the colon concerning for constipation.  Scheduled for GB US today . Follows with [facility] ,   Getting IVFs @ 125ml/hr, /90       * No surgery found *      Hospital Course:   6/6: patient had multiple brown BM yesterday. Her abdominal pain is much better. Abd US WNL. K+ a bit low, replaced today. No nausea or vomiting. Tolerating PO diet. Occult + but has GI follow up for scope this month already. H/H stable. Ready for d/c home     Consults:     * Abdominal pain  Increased distension of the gallbladder and dilatation of the common bile duct to 0.9 cm without evidence of cholelithiasis or " choledocholithiasis.  No significant wall thickening or pericholecystic inflammatory change to suggest cholecystitis.    Repeat abd US WNL for patient  Suspect pain from chronic constipation, lactulose resolved  Home today with lactulose for PRN use at home. Strongly encouraged to cut back on narcotic pain medications as this is making her sx worse.       Chronic constipation  Takes chronic pain meds   Lactulose resolved while in hospital and pain much better  Occult +  Has scope planned with GI this month already  Strongly encouraged her to cut back on pain meds. If abd is hurting and has not had BM try lactulose at home. Prescription sent.       Hypertension  Resume meds at home:    Home Losartan /12.5mg   Diltiazem CD 240mg   Clonidine 0.1 mg daily   Toresemide 10mg po daily      Hypothyroidism  Continue synthroid 50mcg daily       Chronic pain  May be being over medicated. She reports intense pain but these medications are high dose for her age and obviously causing constipation issues. Consider cymbalta outpatient. May benefit from pain managemt eval outpatient as well. Will follow up with PCP next week to discuss and strongly encouraged her to cut back on use.       Fibromyalgia  Takes neurontin 600mg q am, /600mg at noon/900mg at bedtime   Hydrocodone prn       May be being over medicated. She reports intense pain but these medications are high dose for her age and obviously causing constipation issues. Consider cymbalta outpatient. Will follow up with PCP next week to discuss and strongly encouraged her to cut back on use.         Final Active Diagnoses:    Diagnosis Date Noted POA    PRINCIPAL PROBLEM:  Abdominal pain [R10.9] 06/04/2020 Yes    Chronic constipation [K59.09] 11/27/2012 Yes    Fibromyalgia [M79.7] 08/02/2012 Yes    Hypothyroidism [E03.9] 08/02/2012 Yes    Hypertension [I10] 08/02/2012 Yes    Chronic pain [G89.29] 08/02/2012 Yes      Problems Resolved During this Admission:        Discharged Condition: good    Disposition: Home or Self Care    Follow Up:  Follow-up Information     Tomasz Lynch MD In 1 week.    Specialty:  Family Medicine  Contact information:  [address] [place] [id number] [phone]                 Patient Instructions:   No discharge procedures on file.    Significant Diagnostic Studies: Labs:   CMP   Recent Labs   Lab 06/04/20  1803 06/05/20  0604 06/06/20  0553    139 138   K 4.0 3.3* 3.1*   CL 99 103 100   CO2 30* 27 26    109 105   BUN 16 9 9   CREATININE 0.8 0.6 0.8   CALCIUM 9.7 8.9 8.8   PROT 6.8 6.2 6.0   ALBUMIN 4.0 3.7 3.7   BILITOT 0.3 0.4 0.7   ALKPHOS 88 73 69   AST 15 15 16   ALT 12 11 14   ANIONGAP 11 9 12   ESTGFRAFRICA >60 >60 >60   EGFRNONAA >60 >60 >60   , CBC   Recent Labs   Lab 06/04/20  1803 06/05/20  0604 06/06/20  0553   WBC 5.04 4.03 4.94   HGB 11.4* 10.8* 11.2*   HCT 35.0* 33.4* 34.9*    155 184    and All labs within the past 24 hours have been reviewed    Pending Diagnostic Studies:     None         Medications:  Reconciled Home Medications:      Medication List      START taking these medications    lactulose 20 gram/30 mL Soln  Commonly known as:  CHRONULAC  Take 30 mLs (20 g total) by mouth 3 (three) times daily as needed.        CHANGE how you take these medications    HYDROcodone-acetaminophen  mg per tablet  Commonly known as:  NORCO  One po q 6 hrs for back pain; opioid medically necessary & exempt for greater then 7 days  What changed:  Another medication with the same name was removed. Continue taking this medication, and follow the directions you see here.     levothyroxine 50 MCG tablet  Commonly known as:  SYNTHROID  levothyroxine 50 mcg tablet   TAKE 1 TABLET BY MOUTH ONCE DAILY.  What changed:  Another medication with the same name was removed. Continue taking this medication, and follow the directions you see here.        CONTINUE taking these medications    amitriptyline 25 MG  tablet  Commonly known as:  ELAVIL  Take 1 tablet (25 mg total) by mouth every evening.     cloNIDine 0.1 MG tablet  Commonly known as:  CATAPRES  Take 1 tablet (0.1 mg total) by mouth every evening.     diclofenac sodium 1 % Gel  Commonly known as:  VOLTAREN  APPLY TOPICALLY FOUR TIMES DAILY AS NEEDED     diltiaZEM 240 MG 24 hr capsule  Commonly known as:  CARDIZEM CD  One po nightly for HTN     ergocalciferol 50,000 unit Cap  Commonly known as:  VITAMIN D2  Take 1 capsule (50,000 Units total) by mouth every 7 days.     gabapentin 300 MG capsule  Commonly known as:  NEURONTIN  TAKE 2 CAPSULE BY MOUTH BREAKFAST AND LUNCH AND 3 AT BEDTIME     losartan-hydrochlorothiazide 100-12.5 mg 100-12.5 mg Tab  Commonly known as:  HYZAAR  losartan 100 mg-hydrochlorothiazide 12.5 mg tablet   TAKE 1 TABLET BY MOUTH ONCE DAILY.     metoprolol tartrate 100 MG tablet  Commonly known as:  LOPRESSOR  metoprolol tartrate 100 mg tablet   TAKE ONE AT NIGHT FOR HYPERTENSION     NIFEdipine 30 MG (OSM) 24 hr tablet  Commonly known as:  PROCARDIA-XL  nifedipine ER 30 mg tablet,extended release 24 hr   TAKE 1 TABLET BY MOUTH ONCE DAILY     omeprazole 20 MG capsule  Commonly known as:  PriLOSEC  Take 1 capsule (20 mg total) by mouth once daily. 1 Capsule(s) Oral PRN Every day.     ondansetron 4 MG tablet  Commonly known as:  ZOFRAN  Take 1 tablet (4 mg total) by mouth every 6 (six) hours as needed.     pantoprazole 40 MG tablet  Commonly known as:  PROTONIX  Take 40 mg by mouth once daily.     sucralfate 1 gram tablet  Commonly known as:  CARAFATE  Dissolve in 10 cc and drink before meals and bedtime     torsemide 10 MG Tab  Commonly known as:  DEMADEX  torsemide 10 mg tablet   TAKE 1 TABLET (10 MG TOTAL) BY MOUTH ONCE DAILY.            Indwelling Lines/Drains at time of discharge:   Lines/Drains/Airways     None                 Time spent on the discharge of patient: 33 minutes  Patient was seen and examined on the date of discharge and  determined to be suitable for discharge.         Mayuri Mcdonald MD  Department of Hospital Medicine  [facility]

## 2020-06-06 NOTE — HOSPITAL COURSE
6/6: patient had multiple brown BM yesterday. Her abdominal pain is much better. Abd US WNL. K+ a bit low, replaced today. No nausea or vomiting. Tolerating PO diet. Occult + but has GI follow up for scope this month already. H/H stable. Ready for d/c home

## 2020-06-06 NOTE — ASSESSMENT & PLAN NOTE
Takes chronic pain meds   Lactulose resolved while in hospital and pain much better  Occult +  Has scope planned with GI this month already  Strongly encouraged her to cut back on pain meds. If abd is hurting and has not had BM try lactulose at home. Prescription sent.

## 2020-06-06 NOTE — ASSESSMENT & PLAN NOTE
Resume meds at home:    Home Losartan /12.5mg   Diltiazem CD 240mg   Clonidine 0.1 mg daily   Toresemide 10mg po daily

## 2020-06-06 NOTE — ASSESSMENT & PLAN NOTE
May be being over medicated. She reports intense pain but these medications are high dose for her age and obviously causing constipation issues. Consider cymbalta outpatient. May benefit from pain managemt eval outpatient as well. Will follow up with PCP next week to discuss and strongly encouraged her to cut back on use.

## 2020-06-06 NOTE — PROGRESS NOTES
"[facility] Medicine  Progress Note    Patient Name: Cheyenne Leach  MRN: [id number]  Patient Class: OP- Observation   Admission Date: 6/4/2020  Length of Stay: 0 days  Attending Physician: Stacey Boyle MD  Primary Care Provider: Tomasz Lynch MD        Subjective:     Principal Problem:Abdominal pain        HPI:  Cheyenne Leach is a 86 y.o. female  with PMHX of HTN, HLD, Fibromyalgia, Bowel obstruction, and diverticulitis presents to the emergency room with chronic abdominal pain> pt reporting she vomited a " pan of old black blood" a few nights ago and having black stools. None since admission last PM   Patient was seen in another emergency room night before with abdominal pain. Pt tx and was discharged but returned to ER yesterday stating abdominal pain worse.   + C/o nausea vomiting no diarrhea, no fever on ER triage. Patient has had chronic abdominal pain for several years with fibromyalgia issues  CT of abd demonstrating - Increased distension of the gallbladder and dilatation of the common bile duct to 0.9 cm without evidence of cholelithiasis or choledocholithiasis.  No significant wall thickening or pericholecystic inflammatory change to suggest cholecystitis.  This could be further evaluated with right upper quadrant ultrasound.Significant stool seen throughout the colon concerning for constipation.  Scheduled for GB US today . Follows with OP GI ,   Getting IVFs @ 125ml/hr, /90       Overview/Hospital Course:  6/6: patient had multiple brown BM yesterday. Her abdominal pain is much better. Abd US WNL. K+ a bit low, replaced today. No nausea or vomiting. Tolerating PO diet. Occult + but has GI follow up for scope this month already. H/H stable. Ready for d/c home    Past Medical History:   Diagnosis Date    Arthritis     Bowel obstruction 4/6/214    [facility]     Diverticulitis     Fibromyalgia     Hyperlipidemia     Hypertension     Pinched " nerve     x 2    Sciatica        Past Surgical History:   Procedure Laterality Date    APPENDECTOMY      BACK SURGERY      lumbar     BREAST SURGERY      lump removed    CATARACT EXTRACTION W/  INTRAOCULAR LENS IMPLANT Left 2/14/2019    Procedure: EXTRACTION, CATARACT, WITH IOL INSERTION;  Surgeon: Herbie Jerry MD;  Location: [facility] OR;  Service: Ophthalmology;  Laterality: Left;    CATARACT EXTRACTION W/  INTRAOCULAR LENS IMPLANT Right 4/11/2019    Procedure: EXTRACTION, CATARACT, WITH IOL INSERTION;  Surgeon: Herbie Jerry MD;  Location: [facility] OR;  Service: Ophthalmology;  Laterality: Right;    HYSTERECTOMY      intestinal obstruction  11/2012    intestinal obstruction  4/6/2014    PHACOEMULSIFICATION OF CATARACT Left 2/14/2019    Procedure: PHACOEMULSIFICATION, CATARACT;  Surgeon: Herbie Jerry MD;  Location: [facility] OR;  Service: Ophthalmology;  Laterality: Left;    PHACOEMULSIFICATION OF CATARACT Right 4/11/2019    Procedure: PHACOEMULSIFICATION, CATARACT;  Surgeon: Herbie Jerry MD;  Location: [facility] OR;  Service: Ophthalmology;  Laterality: Right;    SMALL INTESTINE SURGERY      vocal cord injections         Review of patient's allergies indicates:   Allergen Reactions    Cymbalta [duloxetine] Nausea And Vomiting    Lyrica  [pregabalin]      Other reaction(s): Vomiting    Ropinirole Nausea And Vomiting    Statins-hmg-coa reductase inhibitors      Other reaction(s): Muscle pain    Sulfa (sulfonamide antibiotics) Rash       Current Facility-Administered Medications on File Prior to Encounter   Medication    tetracaine HCl (PF) 0.5 % Drop 1 drop     Current Outpatient Medications on File Prior to Encounter   Medication Sig    amitriptyline (ELAVIL) 25 MG tablet Take 1 tablet (25 mg total) by mouth every evening.    cloNIDine (CATAPRES) 0.1 MG tablet Take 1 tablet (0.1 mg total) by mouth every evening.    diltiaZEM (CARDIZEM CD) 240 MG 24 hr capsule One po nightly for  HTN    ergocalciferol (VITAMIN D2) 50,000 unit Cap Take 1 capsule (50,000 Units total) by mouth every 7 days.    gabapentin (NEURONTIN) 300 MG capsule TAKE 2 CAPSULE BY MOUTH BREAKFAST AND LUNCH AND 3 AT BEDTIME    HYDROcodone-acetaminophen (NORCO)  mg per tablet One po q 6 hrs for back pain; opioid medically necessary & exempt for greater then 7 days    levothyroxine (SYNTHROID) 50 MCG tablet levothyroxine 50 mcg tablet   TAKE 1 TABLET BY MOUTH ONCE DAILY.    losartan-hydrochlorothiazide 100-12.5 mg (HYZAAR) 100-12.5 mg Tab losartan 100 mg-hydrochlorothiazide 12.5 mg tablet   TAKE 1 TABLET BY MOUTH ONCE DAILY.    metoprolol tartrate (LOPRESSOR) 100 MG tablet metoprolol tartrate 100 mg tablet   TAKE ONE AT NIGHT FOR HYPERTENSION    NIFEdipine (PROCARDIA-XL) 30 MG (OSM) 24 hr tablet nifedipine ER 30 mg tablet,extended release 24 hr   TAKE 1 TABLET BY MOUTH ONCE DAILY    omeprazole (PRILOSEC) 20 MG capsule Take 1 capsule (20 mg total) by mouth once daily. 1 Capsule(s) Oral PRN Every day.    pantoprazole (PROTONIX) 40 MG tablet Take 40 mg by mouth once daily.    sucralfate (CARAFATE) 1 gram tablet Dissolve in 10 cc and drink before meals and bedtime    torsemide (DEMADEX) 10 MG Tab torsemide 10 mg tablet   TAKE 1 TABLET (10 MG TOTAL) BY MOUTH ONCE DAILY.    [DISCONTINUED] HYDROcodone-acetaminophen (NORCO)  mg per tablet Take 1 tablet by mouth every 6 (six) hours as needed. Quantity medically necessary, 7 day limit excluded    [DISCONTINUED] HYDROcodone-acetaminophen (NORCO)  mg per tablet Take 1 tablet by mouth every 6 (six) hours as needed. Opioid is medically necessary; 7 day exempt, for chronic pain    diclofenac sodium (VOLTAREN) 1 % Gel APPLY TOPICALLY FOUR TIMES DAILY AS NEEDED    ondansetron (ZOFRAN) 4 MG tablet Take 1 tablet (4 mg total) by mouth every 6 (six) hours as needed.    [DISCONTINUED] levothyroxine (SYNTHROID) 75 MCG tablet TAKE 1 TABLET (75 MCG TOTAL) BY MOUTH ONCE  DAILY. (Patient not taking: Reported on 3/12/2020)     Family History     Problem Relation (Age of Onset)    Cancer Mother    Tuberculosis Father        Tobacco Use    Smoking status: Never Smoker    Smokeless tobacco: Never Used   Substance and Sexual Activity    Alcohol use: No    Drug use: No    Sexual activity: Never     Review of Systems   Constitutional: Negative for chills, fatigue and fever.   HENT: Negative for congestion, ear pain, postnasal drip, sinus pressure and sore throat.    Eyes: Negative for discharge.   Respiratory: Negative for cough, chest tightness and shortness of breath.    Cardiovascular: Negative.    Gastrointestinal: Positive for abdominal pain (much improved). Negative for blood in stool, constipation, diarrhea, nausea and vomiting.   Genitourinary: Negative for difficulty urinating, flank pain and hematuria.   Musculoskeletal: Positive for arthralgias and myalgias. Negative for joint swelling.   Skin: Negative.    Neurological: Negative for dizziness and headaches.   Psychiatric/Behavioral: Negative for behavioral problems and confusion.     Objective:     Vital Signs (Most Recent):  Temp: 96.8 °F (36 °C) (06/06/20 0708)  Pulse: 74 (06/06/20 0800)  Resp: 17 (06/06/20 0708)  BP: (!) 140/65 (06/06/20 0708)  SpO2: 97 % (06/06/20 0708) Vital Signs (24h Range):  Temp:  [96.6 °F (35.9 °C)-98.8 °F (37.1 °C)] 96.8 °F (36 °C)  Pulse:  [54-91] 74  Resp:  [16-18] 17  SpO2:  [94 %-97 %] 97 %  BP: (110-196)/() 140/65     Weight: 62.2 kg (137 lb 2 oz)  Body mass index is 24.29 kg/m².    Physical Exam   Constitutional: She is oriented to person, place, and time. She appears well-developed and well-nourished. No distress.   HENT:   Head: Normocephalic and atraumatic.   Right Ear: External ear normal.   Left Ear: External ear normal.   Eyes: Pupils are equal, round, and reactive to light. Conjunctivae and EOM are normal. Right eye exhibits no discharge. Left eye exhibits no discharge.   Neck:  Neck supple. No tracheal deviation present.   Cardiovascular: Normal rate and regular rhythm. Exam reveals no gallop and no friction rub.   No murmur heard.  Pulmonary/Chest: Effort normal and breath sounds normal. No respiratory distress. She has no wheezes. She has no rales.   Abdominal: Soft. Bowel sounds are normal. She exhibits no distension. There is tenderness (mild LLQ, much improved).   Neurological: She is alert and oriented to person, place, and time. No cranial nerve deficit.   Skin: Skin is warm and dry.   Psychiatric: She has a normal mood and affect. Her behavior is normal.   Nursing note and vitals reviewed.        CRANIAL NERVES     CN III, IV, VI   Pupils are equal, round, and reactive to light.  Extraocular motions are normal.        Significant Labs:   A1C: No results for input(s): HGBA1C in the last 4320 hours.  Bilirubin:   Recent Labs   Lab 06/04/20  1803 06/05/20  0604 06/06/20  0553   BILITOT 0.3 0.4 0.7     Blood Culture: No results for input(s): LABBLOO in the last 48 hours.  CBC:   Recent Labs   Lab 06/04/20 1803 06/05/20  0604 06/06/20  0553   WBC 5.04 4.03 4.94   HGB 11.4* 10.8* 11.2*   HCT 35.0* 33.4* 34.9*    155 184     CMP:   Recent Labs   Lab 06/04/20  1803 06/05/20  0604 06/06/20  0553    139 138   K 4.0 3.3* 3.1*   CL 99 103 100   CO2 30* 27 26    109 105   BUN 16 9 9   CREATININE 0.8 0.6 0.8   CALCIUM 9.7 8.9 8.8   PROT 6.8 6.2 6.0   ALBUMIN 4.0 3.7 3.7   BILITOT 0.3 0.4 0.7   ALKPHOS 88 73 69   AST 15 15 16   ALT 12 11 14   ANIONGAP 11 9 12   EGFRNONAA >60 >60 >60     Cardiac Markers:   Recent Labs   Lab 06/04/20  1803   *     Coagulation: No results for input(s): PT, INR, APTT in the last 48 hours.  Lactic Acid: No results for input(s): LACTATE in the last 48 hours.  Magnesium: No results for input(s): MG in the last 48 hours.  Troponin:   Recent Labs   Lab 06/04/20  1803   TROPONINI 0.014     TSH: No results for input(s): TSH in the last 4320  hours.  Urine Culture: No results for input(s): LABURIN in the last 48 hours.  Urine Studies:   Recent Labs   Lab 06/04/20  1812   COLORU [place]*   APPEARANCEUA Clear   PHUR >8.0   SPECGRAV 1.015   PROTEINUA 1+*   GLUCUA Trace*   KETONESU Trace*   BILIRUBINUA Negative   OCCULTUA Trace*   NITRITE Positive*   UROBILINOGEN 1.0   LEUKOCYTESUR Negative   RBCUA 10*   WBCUA 1   BACTERIA None   HYALINECASTS 0     All pertinent labs within the past 24 hours have been reviewed.    Significant Imaging: I have reviewed all pertinent imaging results/findings within the past 24 hours.     6/4/2020 CT of abd   Increased distension of the gallbladder and dilatation of the common bile duct to 0.9 cm without evidence of cholelithiasis or choledocholithiasis.  No significant wall thickening or pericholecystic inflammatory change to suggest cholecystitis.  This could be further evaluated with right upper quadrant ultrasound.  Significant stool seen throughout the colon concerning for constipation.  Moderate-sized hiatal hernia.  Significant anterolisthesis L5 on S1 with L2-3 L5 fusion.      Assessment/Plan:      * Abdominal pain  Increased distension of the gallbladder and dilatation of the common bile duct to 0.9 cm without evidence of cholelithiasis or choledocholithiasis.  No significant wall thickening or pericholecystic inflammatory change to suggest cholecystitis.    Repeat abd US WNL for patient  Suspect pain from chronic constipation, lactulose resolved  Home today with lactulose for PRN use at home. Strongly encouraged to cut back on narcotic pain medications as this is making her sx worse.       Chronic constipation  Takes chronic pain meds   Lactulose resolved while in hospital and pain much better  Occult +  Has scope planned with GI this month already  Strongly encouraged her to cut back on pain meds. If abd is hurting and has not had BM try lactulose at home. Prescription sent.       Hypertension  Resume meds at  home:    Home Losartan /12.5mg   Diltiazem CD 240mg   Clonidine 0.1 mg daily   Toresemide 10mg po daily      Hypothyroidism  Continue synthroid 50mcg daily       Chronic pain  May be being over medicated. She reports intense pain but these medications are high dose for her age and obviously causing constipation issues. Consider cymbalta outpatient. May benefit from pain managemt eval outpatient as well. Will follow up with PCP next week to discuss and strongly encouraged her to cut back on use.       Fibromyalgia  Takes neurontin 600mg q am, /600mg at noon/900mg at bedtime   Hydrocodone prn       May be being over medicated. She reports intense pain but these medications are high dose for her age and obviously causing constipation issues. Consider cymbalta outpatient. Will follow up with PCP next week to discuss and strongly encouraged her to cut back on use.         VTE Risk Mitigation (From admission, onward)         Ordered     IP VTE HIGH RISK PATIENT  Once      06/04/20 2203     Place sequential compression device  Until discontinued      06/04/20 2203                      Mayuri Mcdonald MD  Department of Hospital Medicine   [facility]

## 2020-06-06 NOTE — SUBJECTIVE & OBJECTIVE
Past Medical History:   Diagnosis Date    Arthritis     Bowel obstruction 4/6/214    [facility]     Diverticulitis     Fibromyalgia     Hyperlipidemia     Hypertension     Pinched nerve     x 2    Sciatica        Past Surgical History:   Procedure Laterality Date    APPENDECTOMY      BACK SURGERY      lumbar     BREAST SURGERY      lump removed    CATARACT EXTRACTION W/  INTRAOCULAR LENS IMPLANT Left 2/14/2019    Procedure: EXTRACTION, CATARACT, WITH IOL INSERTION;  Surgeon: Herbie Jerry MD;  Location: [facility] OR;  Service: Ophthalmology;  Laterality: Left;    CATARACT EXTRACTION W/  INTRAOCULAR LENS IMPLANT Right 4/11/2019    Procedure: EXTRACTION, CATARACT, WITH IOL INSERTION;  Surgeon: Herbie Jerry MD;  Location: [facility] OR;  Service: Ophthalmology;  Laterality: Right;    HYSTERECTOMY      intestinal obstruction  11/2012    intestinal obstruction  4/6/2014    PHACOEMULSIFICATION OF CATARACT Left 2/14/2019    Procedure: PHACOEMULSIFICATION, CATARACT;  Surgeon: Herbie Jerry MD;  Location: [facility] OR;  Service: Ophthalmology;  Laterality: Left;    PHACOEMULSIFICATION OF CATARACT Right 4/11/2019    Procedure: PHACOEMULSIFICATION, CATARACT;  Surgeon: Herbie Jerry MD;  Location: [facility] OR;  Service: Ophthalmology;  Laterality: Right;    SMALL INTESTINE SURGERY      vocal cord injections         Review of patient's allergies indicates:   Allergen Reactions    Cymbalta [duloxetine] Nausea And Vomiting    Lyrica  [pregabalin]      Other reaction(s): Vomiting    Ropinirole Nausea And Vomiting    Statins-hmg-coa reductase inhibitors      Other reaction(s): Muscle pain    Sulfa (sulfonamide antibiotics) Rash       Current Facility-Administered Medications on File Prior to Encounter   Medication    tetracaine HCl (PF) 0.5 % Drop 1 drop     Current Outpatient Medications on File Prior to Encounter   Medication Sig    amitriptyline (ELAVIL) 25 MG tablet Take 1 tablet (25  mg total) by mouth every evening.    cloNIDine (CATAPRES) 0.1 MG tablet Take 1 tablet (0.1 mg total) by mouth every evening.    diltiaZEM (CARDIZEM CD) 240 MG 24 hr capsule One po nightly for HTN    ergocalciferol (VITAMIN D2) 50,000 unit Cap Take 1 capsule (50,000 Units total) by mouth every 7 days.    gabapentin (NEURONTIN) 300 MG capsule TAKE 2 CAPSULE BY MOUTH BREAKFAST AND LUNCH AND 3 AT BEDTIME    HYDROcodone-acetaminophen (NORCO)  mg per tablet One po q 6 hrs for back pain; opioid medically necessary & exempt for greater then 7 days    levothyroxine (SYNTHROID) 50 MCG tablet levothyroxine 50 mcg tablet   TAKE 1 TABLET BY MOUTH ONCE DAILY.    losartan-hydrochlorothiazide 100-12.5 mg (HYZAAR) 100-12.5 mg Tab losartan 100 mg-hydrochlorothiazide 12.5 mg tablet   TAKE 1 TABLET BY MOUTH ONCE DAILY.    metoprolol tartrate (LOPRESSOR) 100 MG tablet metoprolol tartrate 100 mg tablet   TAKE ONE AT NIGHT FOR HYPERTENSION    NIFEdipine (PROCARDIA-XL) 30 MG (OSM) 24 hr tablet nifedipine ER 30 mg tablet,extended release 24 hr   TAKE 1 TABLET BY MOUTH ONCE DAILY    omeprazole (PRILOSEC) 20 MG capsule Take 1 capsule (20 mg total) by mouth once daily. 1 Capsule(s) Oral PRN Every day.    pantoprazole (PROTONIX) 40 MG tablet Take 40 mg by mouth once daily.    sucralfate (CARAFATE) 1 gram tablet Dissolve in 10 cc and drink before meals and bedtime    torsemide (DEMADEX) 10 MG Tab torsemide 10 mg tablet   TAKE 1 TABLET (10 MG TOTAL) BY MOUTH ONCE DAILY.    [DISCONTINUED] HYDROcodone-acetaminophen (NORCO)  mg per tablet Take 1 tablet by mouth every 6 (six) hours as needed. Quantity medically necessary, 7 day limit excluded    [DISCONTINUED] HYDROcodone-acetaminophen (NORCO)  mg per tablet Take 1 tablet by mouth every 6 (six) hours as needed. Opioid is medically necessary; 7 day exempt, for chronic pain    diclofenac sodium (VOLTAREN) 1 % Gel APPLY TOPICALLY FOUR TIMES DAILY AS NEEDED     ondansetron (ZOFRAN) 4 MG tablet Take 1 tablet (4 mg total) by mouth every 6 (six) hours as needed.    [DISCONTINUED] levothyroxine (SYNTHROID) 75 MCG tablet TAKE 1 TABLET (75 MCG TOTAL) BY MOUTH ONCE DAILY. (Patient not taking: Reported on 3/12/2020)     Family History     Problem Relation (Age of Onset)    Cancer Mother    Tuberculosis Father        Tobacco Use    Smoking status: Never Smoker    Smokeless tobacco: Never Used   Substance and Sexual Activity    Alcohol use: No    Drug use: No    Sexual activity: Never     Review of Systems   Constitutional: Negative for chills, fatigue and fever.   HENT: Negative for congestion, ear pain, postnasal drip, sinus pressure and sore throat.    Eyes: Negative for discharge.   Respiratory: Negative for cough, chest tightness and shortness of breath.    Cardiovascular: Negative.    Gastrointestinal: Positive for abdominal pain (much improved). Negative for blood in stool, constipation, diarrhea, nausea and vomiting.   Genitourinary: Negative for difficulty urinating, flank pain and hematuria.   Musculoskeletal: Positive for arthralgias and myalgias. Negative for joint swelling.   Skin: Negative.    Neurological: Negative for dizziness and headaches.   Psychiatric/Behavioral: Negative for behavioral problems and confusion.     Objective:     Vital Signs (Most Recent):  Temp: 96.8 °F (36 °C) (06/06/20 0708)  Pulse: 74 (06/06/20 0800)  Resp: 17 (06/06/20 0708)  BP: (!) 140/65 (06/06/20 0708)  SpO2: 97 % (06/06/20 0708) Vital Signs (24h Range):  Temp:  [96.6 °F (35.9 °C)-98.8 °F (37.1 °C)] 96.8 °F (36 °C)  Pulse:  [54-91] 74  Resp:  [16-18] 17  SpO2:  [94 %-97 %] 97 %  BP: (110-196)/() 140/65     Weight: 62.2 kg (137 lb 2 oz)  Body mass index is 24.29 kg/m².    Physical Exam   Constitutional: She is oriented to person, place, and time. She appears well-developed and well-nourished. No distress.   HENT:   Head: Normocephalic and atraumatic.   Right Ear: External ear  normal.   Left Ear: External ear normal.   Eyes: Pupils are equal, round, and reactive to light. Conjunctivae and EOM are normal. Right eye exhibits no discharge. Left eye exhibits no discharge.   Neck: Neck supple. No tracheal deviation present.   Cardiovascular: Normal rate and regular rhythm. Exam reveals no gallop and no friction rub.   No murmur heard.  Pulmonary/Chest: Effort normal and breath sounds normal. No respiratory distress. She has no wheezes. She has no rales.   Abdominal: Soft. Bowel sounds are normal. She exhibits no distension. There is tenderness (mild LLQ, much improved).   Neurological: She is alert and oriented to person, place, and time. No cranial nerve deficit.   Skin: Skin is warm and dry.   Psychiatric: She has a normal mood and affect. Her behavior is normal.   Nursing note and vitals reviewed.        CRANIAL NERVES     CN III, IV, VI   Pupils are equal, round, and reactive to light.  Extraocular motions are normal.        Significant Labs:   A1C: No results for input(s): HGBA1C in the last 4320 hours.  Bilirubin:   Recent Labs   Lab 06/04/20 1803 06/05/20  0604 06/06/20  0553   BILITOT 0.3 0.4 0.7     Blood Culture: No results for input(s): LABBLOO in the last 48 hours.  CBC:   Recent Labs   Lab 06/04/20 1803 06/05/20  0604 06/06/20  0553   WBC 5.04 4.03 4.94   HGB 11.4* 10.8* 11.2*   HCT 35.0* 33.4* 34.9*    155 184     CMP:   Recent Labs   Lab 06/04/20  1803 06/05/20  0604 06/06/20  0553    139 138   K 4.0 3.3* 3.1*   CL 99 103 100   CO2 30* 27 26    109 105   BUN 16 9 9   CREATININE 0.8 0.6 0.8   CALCIUM 9.7 8.9 8.8   PROT 6.8 6.2 6.0   ALBUMIN 4.0 3.7 3.7   BILITOT 0.3 0.4 0.7   ALKPHOS 88 73 69   AST 15 15 16   ALT 12 11 14   ANIONGAP 11 9 12   EGFRNONAA >60 >60 >60     Cardiac Markers:   Recent Labs   Lab 06/04/20  1803   *     Coagulation: No results for input(s): PT, INR, APTT in the last 48 hours.  Lactic Acid: No results for input(s): LACTATE in  the last 48 hours.  Magnesium: No results for input(s): MG in the last 48 hours.  Troponin:   Recent Labs   Lab 06/04/20  1803   TROPONINI 0.014     TSH: No results for input(s): TSH in the last 4320 hours.  Urine Culture: No results for input(s): LABURIN in the last 48 hours.  Urine Studies:   Recent Labs   Lab 06/04/20  1812   COLORU [place]*   APPEARANCEUA Clear   PHUR >8.0   SPECGRAV 1.015   PROTEINUA 1+*   GLUCUA Trace*   KETONESU Trace*   BILIRUBINUA Negative   OCCULTUA Trace*   NITRITE Positive*   UROBILINOGEN 1.0   LEUKOCYTESUR Negative   RBCUA 10*   WBCUA 1   BACTERIA None   HYALINECASTS 0     All pertinent labs within the past 24 hours have been reviewed.    Significant Imaging: I have reviewed all pertinent imaging results/findings within the past 24 hours.     6/4/2020 CT of abd   Increased distension of the gallbladder and dilatation of the common bile duct to 0.9 cm without evidence of cholelithiasis or choledocholithiasis.  No significant wall thickening or pericholecystic inflammatory change to suggest cholecystitis.  This could be further evaluated with right upper quadrant ultrasound.  Significant stool seen throughout the colon concerning for constipation.  Moderate-sized hiatal hernia.  Significant anterolisthesis L5 on S1 with L2-3 L5 fusion.

## 2020-06-06 NOTE — DISCHARGE INSTRUCTIONS
Constipation (Adult)  Constipation means that you have bowel movements that are less frequent than usual. Stools often become very hard and difficult to pass.  Constipation is very common. At some point in life it affects almost everyone. Since everyone's bowel habits are different, what is constipation to one person may not be to another. Your healthcare provider may do tests to diagnose constipation. It depends on what he or she finds when evaluating you.    Symptoms of constipation include:  · Abdominal pain  · Bloating  · Vomiting  · Painful bowel movements  · Itching, swelling, bleeding, or pain around the anus  Causes  Constipation can have many causes. These include:  · Diet low in fiber  · Too much dairy  · Not drinking enough liquids  · Lack of exercise or physical activity. This is especially true for older adults.  · Changes in lifestyle or daily routine, including pregnancy, aging, work, and travel  · Frequent use or misuse of laxatives  · Ignoring the urge to have a bowel movement or delaying it until later  · Medicines, such as certain prescription pain medicines, iron supplements, antacids, certain antidepressants, and calcium supplements  · Diseases like irritable bowel syndrome, bowel obstructions, stroke, diabetes, thyroid disease, Parkinson disease, hemorrhoids, and colon cancer  Complications  Potential complications of constipation can include:  · Hemorrhoids  · Rectal bleeding from hemorrhoids or anal fissures (skin tears)  · Hernias  · Dependency on laxatives  · Chronic constipation  · Fecal impaction  · Bowel obstruction or perforation  Home care  All treatment should be done after talking with your healthcare provider. This is especially true if you have another medical problems, are taking prescription medicines, or are an older adult. Treatment most often involves lifestyle changes. You may also need medicines. Your healthcare provider will tell you which will work best for you. Follow  the advice below to help avoid this problem in the future.  Lifestyle changes  These lifestyle changes can help prevent constipation:  · Diet. Eat a high-fiber diet, with fresh fruit and vegetables, and reduce dairy intake, meats, and processed foods  · Fluids. It's important to get enough fluids each day. Drink plenty of water when you eat more fiber. If you are on diet that limits the amount of fluid you can have, talk about this with your healthcare provider.  · Regular exercise. Check with your healthcare provider first.  Medications  Take any medicines as directed. Some laxatives are safe to use only every now and then. Others can be taken on a regular basis. Talk with your doctor or pharmacist if you have questions.  Prescription pain medicines can cause constipation. If you are taking this kind of medicine, ask your healthcare provider if you should also take a stool softener.  Medicines you may take to treat constipation include:  · Fiber supplements  · Stool softeners  · Laxatives  · Enemas  · Rectal suppositories  Follow-up care  Follow up with your healthcare provider if symptoms don't get better in the next few days. You may need to have more tests or see a specialist.  Call 911  Call 911 if any of these occur:  · Trouble breathing  · Stiff, rigid abdomen that is severely painful to touch  · Confusion  · Fainting or loss of consciousness  · Rapid heart rate  · Chest pain  When to seek medical advice  Call your healthcare provider right away if any of these occur:  · Fever over 100.4°F (38°C)  · Failure to resume normal bowel movements  · Pain in your abdomen or back gets worse  · Nausea or vomiting  · Swelling in your abdomen  · Blood in the stool  · Black, tarry stool  · Involuntary weight loss  · Weakness  Date Last Reviewed: 12/30/2015  © 6136-0063 [employer]. [address] [place] [address], [place] [address]. All rights reserved. This information is not intended as a substitute for  professional medical care. Always follow your healthcare professional's instructions.

## 2020-06-06 NOTE — ASSESSMENT & PLAN NOTE
Takes neurontin 600mg q am, /600mg at noon/900mg at bedtime   Hydrocodone prn       May be being over medicated. She reports intense pain but these medications are high dose for her age and obviously causing constipation issues. Consider cymbalta outpatient. Will follow up with PCP next week to discuss and strongly encouraged her to cut back on use.

## 2020-06-08 ENCOUNTER — TELEPHONE (OUTPATIENT)
Dept: FAMILY MEDICINE | Facility: CLINIC | Age: 85
End: 2020-06-08

## 2020-06-08 NOTE — TELEPHONE ENCOUNTER
Contacted ans scheduled pt an apt for 06/15/2020 at 2:00 pt verbalized understanding. Advised pt if she needs to be seen before apt to go to the ER pt verbalized understanding.

## 2020-06-08 NOTE — TELEPHONE ENCOUNTER
----- Message from George Rao sent at 2020 12:06 PM CDT -----  Contact: Patient  Cheyenne Leach  MRN: [id number]  : 7/3/1933  PCP: Tomasz Lynch  Home Phone      [phone]  Work Phone      Not on file.  Mobile          [phone]      MESSAGE: requesting order for transport chair, due to back pain    Call [phone]    PCP: Syed

## 2020-06-08 NOTE — PLAN OF CARE
06/08/20 1204   Medicare Message   Important Message from Medicare regarding Discharge Appeal Rights Given to patient/caregiver;Explained to patient/caregiver;Signed/date by patient/caregiver   Date IMM was signed 06/05/20   Time IMM was signed 0910     Signed for discharge.

## 2020-06-08 NOTE — TELEPHONE ENCOUNTER
----- Message from George Rao sent at 2020  8:33 AM CDT -----  Contact: Patient  Cheyenne Leach  MRN: [id number]  : 7/3/1933  PCP: Tomasz Lynch  Home Phone      [phone]  Work Phone      Not on file.  Mobile          [phone]      MESSAGE: needs appt for hosp f/u -- requesting appt with Dr Lynch    Call [phone]    PCP: Syed

## 2020-06-08 NOTE — TELEPHONE ENCOUNTER
Pt informed up to date chart notes will be needed for insurance to pay for transport chair  Pt stated she can wait until her scheduled appt for orders

## 2020-06-12 VITALS
HEART RATE: 74 BPM | HEIGHT: 63 IN | BODY MASS INDEX: 24.3 KG/M2 | TEMPERATURE: 97 F | DIASTOLIC BLOOD PRESSURE: 65 MMHG | WEIGHT: 137.13 LBS | SYSTOLIC BLOOD PRESSURE: 140 MMHG | OXYGEN SATURATION: 97 % | RESPIRATION RATE: 17 BRPM

## 2020-06-15 ENCOUNTER — TELEPHONE (OUTPATIENT)
Dept: FAMILY MEDICINE | Facility: CLINIC | Age: 85
End: 2020-06-15

## 2020-06-15 ENCOUNTER — OFFICE VISIT (OUTPATIENT)
Dept: FAMILY MEDICINE | Facility: CLINIC | Age: 85
End: 2020-06-15
Payer: MEDICARE

## 2020-06-15 VITALS
SYSTOLIC BLOOD PRESSURE: 160 MMHG | DIASTOLIC BLOOD PRESSURE: 80 MMHG | WEIGHT: 142.63 LBS | HEIGHT: 63 IN | RESPIRATION RATE: 20 BRPM | HEART RATE: 72 BPM | BODY MASS INDEX: 25.27 KG/M2

## 2020-06-15 DIAGNOSIS — M25.569 RECURRENT KNEE PAIN, UNSPECIFIED LATERALITY: Primary | ICD-10-CM

## 2020-06-15 DIAGNOSIS — Z74.09 IMPAIRED MOBILITY: ICD-10-CM

## 2020-06-15 PROCEDURE — 1101F PT FALLS ASSESS-DOCD LE1/YR: CPT | Mod: CPTII,S$GLB,, | Performed by: FAMILY MEDICINE

## 2020-06-15 PROCEDURE — 99999 PR PBB SHADOW E&M-EST. PATIENT-LVL IV: ICD-10-PCS | Mod: PBBFAC,,, | Performed by: FAMILY MEDICINE

## 2020-06-15 PROCEDURE — 99999 PR PBB SHADOW E&M-EST. PATIENT-LVL IV: CPT | Mod: PBBFAC,,, | Performed by: FAMILY MEDICINE

## 2020-06-15 PROCEDURE — 99214 OFFICE O/P EST MOD 30 MIN: CPT | Mod: 25,S$GLB,, | Performed by: FAMILY MEDICINE

## 2020-06-15 PROCEDURE — 20610 PR DRAIN/INJECT LARGE JOINT/BURSA: ICD-10-PCS | Mod: RT,S$GLB,, | Performed by: FAMILY MEDICINE

## 2020-06-15 PROCEDURE — 1101F PR PT FALLS ASSESS DOC 0-1 FALLS W/OUT INJ PAST YR: ICD-10-PCS | Mod: CPTII,S$GLB,, | Performed by: FAMILY MEDICINE

## 2020-06-15 PROCEDURE — 1159F PR MEDICATION LIST DOCUMENTED IN MEDICAL RECORD: ICD-10-PCS | Mod: S$GLB,,, | Performed by: FAMILY MEDICINE

## 2020-06-15 PROCEDURE — 1159F MED LIST DOCD IN RCRD: CPT | Mod: S$GLB,,, | Performed by: FAMILY MEDICINE

## 2020-06-15 PROCEDURE — 20610 DRAIN/INJ JOINT/BURSA W/O US: CPT | Mod: RT,S$GLB,, | Performed by: FAMILY MEDICINE

## 2020-06-15 PROCEDURE — 1125F PR PAIN SEVERITY QUANTIFIED, PAIN PRESENT: ICD-10-PCS | Mod: S$GLB,,, | Performed by: FAMILY MEDICINE

## 2020-06-15 PROCEDURE — 99214 PR OFFICE/OUTPT VISIT, EST, LEVL IV, 30-39 MIN: ICD-10-PCS | Mod: 25,S$GLB,, | Performed by: FAMILY MEDICINE

## 2020-06-15 PROCEDURE — 1125F AMNT PAIN NOTED PAIN PRSNT: CPT | Mod: S$GLB,,, | Performed by: FAMILY MEDICINE

## 2020-06-15 RX ORDER — HYDROCODONE BITARTRATE AND ACETAMINOPHEN 10; 325 MG/1; MG/1
TABLET ORAL
Qty: 120 TABLET | Refills: 0 | Status: SHIPPED | OUTPATIENT
Start: 2020-08-15 | End: 2020-06-29 | Stop reason: SDUPTHER

## 2020-06-15 RX ORDER — KETOROLAC TROMETHAMINE 30 MG/ML
15 INJECTION, SOLUTION INTRAMUSCULAR; INTRAVENOUS
Status: DISCONTINUED | OUTPATIENT
Start: 2020-06-15 | End: 2020-06-15

## 2020-06-15 RX ORDER — HYDROCODONE BITARTRATE AND ACETAMINOPHEN 10; 325 MG/1; MG/1
TABLET ORAL
Qty: 120 TABLET | Refills: 0 | Status: SHIPPED | OUTPATIENT
Start: 2020-07-15 | End: 2020-07-01 | Stop reason: SDUPTHER

## 2020-06-15 RX ORDER — LACTULOSE 10 G/15ML
SOLUTION ORAL
COMMUNITY
Start: 2020-06-06 | End: 2020-07-16 | Stop reason: SDUPTHER

## 2020-06-15 RX ORDER — HYDROCODONE BITARTRATE AND ACETAMINOPHEN 10; 325 MG/1; MG/1
TABLET ORAL
Qty: 120 TABLET | Refills: 0 | Status: SHIPPED | OUTPATIENT
Start: 2020-09-14 | End: 2020-07-16 | Stop reason: SDUPTHER

## 2020-06-15 RX ORDER — METHYLPREDNISOLONE ACETATE 40 MG/ML
40 INJECTION, SUSPENSION INTRA-ARTICULAR; INTRALESIONAL; INTRAMUSCULAR; SOFT TISSUE
Status: COMPLETED | OUTPATIENT
Start: 2020-06-15 | End: 2020-06-15

## 2020-06-15 RX ORDER — BUPIVACAINE HYDROCHLORIDE 5 MG/ML
1 INJECTION, SOLUTION EPIDURAL; INTRACAUDAL
Status: COMPLETED | OUTPATIENT
Start: 2020-06-15 | End: 2020-06-15

## 2020-06-15 RX ORDER — KETOROLAC TROMETHAMINE 30 MG/ML
15 INJECTION, SOLUTION INTRAMUSCULAR; INTRAVENOUS
Status: COMPLETED | OUTPATIENT
Start: 2020-06-15 | End: 2020-06-15

## 2020-06-15 RX ORDER — HYDROCODONE BITARTRATE AND ACETAMINOPHEN 10; 325 MG/1; MG/1
TABLET ORAL
Qty: 120 TABLET | Refills: 0 | Status: SHIPPED | OUTPATIENT
Start: 2020-08-14 | End: 2020-06-15 | Stop reason: SDUPTHER

## 2020-06-15 RX ADMIN — METHYLPREDNISOLONE ACETATE 40 MG: 40 INJECTION, SUSPENSION INTRA-ARTICULAR; INTRALESIONAL; INTRAMUSCULAR; SOFT TISSUE at 03:06

## 2020-06-15 RX ADMIN — KETOROLAC TROMETHAMINE 15 MG: 30 INJECTION, SOLUTION INTRAMUSCULAR; INTRAVENOUS at 03:06

## 2020-06-15 RX ADMIN — BUPIVACAINE HYDROCHLORIDE 5 MG: 5 INJECTION, SOLUTION EPIDURAL; INTRACAUDAL at 03:06

## 2020-06-15 NOTE — PROGRESS NOTES
Subjective:       Patient ID: Cheyenne Leach is a 86 y.o. female.    Chief Complaint: Follow-up (hospital follow up )    Pt is a 86 y.o. female who presents for check up for UGI w black stools.. Doing well on current meds. Denies any side effect.    Review of Systems   Constitutional: Negative for appetite change, chills and fever.   HENT: Negative for rhinorrhea, sinus pressure, sore throat and trouble swallowing.    Respiratory: Negative for cough, chest tightness, shortness of breath and wheezing.    Cardiovascular: Negative for chest pain and palpitations.   Gastrointestinal: Positive for abdominal pain. Negative for blood in stool, diarrhea, nausea and vomiting.        No more abdominal pains and normal looking stools   Genitourinary: Negative for dysuria, flank pain, hematuria, pelvic pain, urgency, vaginal bleeding, vaginal discharge and vaginal pain.   Musculoskeletal: Positive for gait problem. Negative for back pain, joint swelling and neck stiffness.        Pt requires a light weight wheel chair for DJD (degenerative joint disease) of knee & impaired mobility. Pts mobility significantly impairs her ability to participate in ADL's like toileting, and grooming within the home, and completeing ADL's within a reasonable time frame. Cane or walker cannot sufficiently resolve pts mobility. Pt is at risk of falls. Home has adequate acces between rooms and maneuvering space. The use of a light weight wheel chair will improve pts ability to participate in ADL's. Pt cannot self propel a standard manual wheelchair. Pt has the strength to propel light weight wheel chair. Pt has a  caregiver who is available, willing and able to provide assistance with the light weight wheel chair.        Skin: Negative for rash.   Neurological: Negative for dizziness, weakness, light-headedness, numbness and headaches.   Hematological: Does not bruise/bleed easily.   Psychiatric/Behavioral: Negative for agitation. The patient is  not nervous/anxious.        Objective:      Physical Exam  Constitutional:       Appearance: She is well-developed.   HENT:      Head: Normocephalic.   Eyes:      Pupils: Pupils are equal, round, and reactive to light.   Neck:      Musculoskeletal: Normal range of motion and neck supple.      Thyroid: No thyromegaly.   Cardiovascular:      Rate and Rhythm: Normal rate and regular rhythm.   Pulmonary:      Effort: No respiratory distress.      Breath sounds: No wheezing or rales.   Chest:      Chest wall: No tenderness.   Abdominal:      General: There is no distension.      Tenderness: There is no abdominal tenderness. There is no guarding or rebound.      Hernia: A hernia is present.   Musculoskeletal:         General: No tenderness.      Comments: R knee with TLC   Lymphadenopathy:      Cervical: No cervical adenopathy.   Skin:     General: Skin is warm and dry.      Coloration: Skin is not pale.      Findings: Rash present.   Neurological:      Mental Status: She is alert and oriented to person, place, and time. Mental status is at baseline.      Cranial Nerves: No cranial nerve deficit.      Motor: No abnormal muscle tone.      Coordination: Coordination normal.      Deep Tendon Reflexes: Reflexes are normal and symmetric. Reflexes normal.   Psychiatric:         Thought Content: Thought content normal.         Judgment: Judgment normal.         Assessment:       1. Recurrent knee pain, unspecified laterality        Plan:   Cheyenne was seen today for follow-up.    Diagnoses and all orders for this visit:    Recurrent knee pain, unspecified laterality  -     bupivacaine (PF) 0.5% (5 mg/mL) injection 5 mg  -     methylPREDNISolone acetate injection 40 mg  -     ketorolac injection 15 mg    Other orders  -     HYDROcodone-acetaminophen (NORCO)  mg per tablet; ONE PO Q 6 HRS; opioid id medically exempt & necessary; For greater then 7 days  -     HYDROcodone-acetaminophen (NORCO)  mg per tablet; One po q  6 hrs for back pain; opioid medically necessary & exempt for greater then 7 days  -     HYDROcodone-acetaminophen (NORCO)  mg per tablet; One po qid for knee pain ; opioid is exempt and medically exempt for greater then 7 days    Pt's R knee was prepped with Betadine and 1cc Medrol, 1cc Marcaine, & 15 mg Toradol was injected interarticularly into the R knee anterior compartment space.

## 2020-06-15 NOTE — TELEPHONE ENCOUNTER
Please add the following notes to pts visit for today, for her transport chair.          Pt requires a transport chair for DJD (degenerative joint disease) of knee & impaired mobility. Pts mobility significantly impairs her ability to participate in ADL's like toileting, and grooming within the home, and completeing ADL's within a reasonable time frame. Cane or walker cannot sufficiently resolve pts mobility. Pt is at risk of falls. Home has adequate acces between rooms and maneuvering space. The use of a transport chair will improve pts ability to participate in ADL's. Pt cannot self propel a standard manual wheelchair. Pt has the strength to propel transport chair. Pt has a  caregiver who is available, willing and able to provide assistance with the transport chair.

## 2020-06-18 NOTE — TELEPHONE ENCOUNTER
Per [facility] transport chairs are not covered by pt insurance    Self pay is $225, attempted to notify pt no answer. Phone just rung      If pt would like we can try for a light weight wheel chair

## 2020-06-24 ENCOUNTER — TELEPHONE (OUTPATIENT)
Dept: FAMILY MEDICINE | Facility: CLINIC | Age: 85
End: 2020-06-24

## 2020-06-24 NOTE — TELEPHONE ENCOUNTER
----- Message from Anna Heredia sent at 2020  3:13 PM CDT -----  Regarding: Chair  Contact: Matias Leach  MRN: [id number]  : 7/3/1933  PCP: Tomasz Lynch  Home Phone      [phone]  Work Phone      Not on file.  Mobile          [phone]      MESSAGE:   Patient would like to speak with J about a chair that was supposed to be ordered,    Phone:  [phone]

## 2020-06-24 NOTE — TELEPHONE ENCOUNTER
Spoke with pt--per RIMAJ, insurance will not cover the transport chair as requested. She said they will cover the light weight w/c, so notes will have to be amended to reflect this info. Visit of 6/15/20  Please advise.

## 2020-06-29 ENCOUNTER — CLINICAL SUPPORT (OUTPATIENT)
Dept: FAMILY MEDICINE | Facility: CLINIC | Age: 85
End: 2020-06-29
Payer: MEDICARE

## 2020-06-29 ENCOUNTER — TELEPHONE (OUTPATIENT)
Dept: FAMILY MEDICINE | Facility: CLINIC | Age: 85
End: 2020-06-29

## 2020-06-29 VITALS
BODY MASS INDEX: 25.27 KG/M2 | SYSTOLIC BLOOD PRESSURE: 122 MMHG | HEART RATE: 68 BPM | HEIGHT: 63 IN | DIASTOLIC BLOOD PRESSURE: 60 MMHG

## 2020-06-29 PROCEDURE — 99999 PR PBB SHADOW E&M-EST. PATIENT-LVL II: CPT | Mod: PBBFAC,,,

## 2020-06-29 PROCEDURE — 99999 PR PBB SHADOW E&M-EST. PATIENT-LVL II: ICD-10-PCS | Mod: PBBFAC,,,

## 2020-06-29 RX ORDER — HYDROCODONE BITARTRATE AND ACETAMINOPHEN 10; 325 MG/1; MG/1
TABLET ORAL
Qty: 120 TABLET | Refills: 0 | Status: SHIPPED | OUTPATIENT
Start: 2020-08-15 | End: 2020-07-16 | Stop reason: SDUPTHER

## 2020-06-29 NOTE — PROGRESS NOTES
"Patient here for BP check today. States she sees elevated readings in the evening time, as in "close to 200" per the patient. She does state that is not worried about her blood pressure because she takes her clonidine and goes to bed and it goes down.    BP today: 122/60  Pulse: 68  "

## 2020-06-29 NOTE — TELEPHONE ENCOUNTER
----- Message from Fannie Jason sent at 2020  3:28 PM CDT -----  Contact: self  Cheyenne Leach  MRN: [id number]  : 7/3/1933  PCP: Tomasz Lynch  Home Phone      [phone]  Work Phone      Not on file.  Mobile          [phone]      MESSAGE:  Pt states HYDROcodone-acetaminophen (NORCO)  mg per tablet was not able to be filled by pharmacy. Pt states not sure but will try to get pharmacy to contact us.  Phone: [phone]

## 2020-06-29 NOTE — TELEPHONE ENCOUNTER
"Patient here for BP check today. States she sees elevated readings in the evening time, as in "close to 200" per the patient. She does state that is not worried about her blood pressure because she takes her clonidine and goes to bed and it goes down.     BP today: 122/60  Pulse: 68    Patient uses [facility] in [place].  "

## 2020-06-30 NOTE — TELEPHONE ENCOUNTER
Doc we need to amend the notes to pts visit from 06/15.    Insurance will not cover a transport chair,but they will cover a light weight wheel chair. We need to change the wording to light weight wheel chair.

## 2020-07-01 RX ORDER — HYDROCODONE BITARTRATE AND ACETAMINOPHEN 10; 325 MG/1; MG/1
TABLET ORAL
Qty: 120 TABLET | Refills: 0 | Status: SHIPPED | OUTPATIENT
Start: 2020-07-01 | End: 2020-10-01 | Stop reason: SDUPTHER

## 2020-07-01 NOTE — TELEPHONE ENCOUNTER
----- Message from Anna Heredia sent at 2020  8:39 AM CDT -----  Regarding: Med refill  Contact: Matias Leach  MRN: [id number]  : 7/3/1933  PCP: Tomasz Lynch  Home Phone      [phone]  Work Phone      Not on file.  Mobile          [phone]      MESSAGE:   Pt requesting refill or new Rx.   Is this a refill or new RX:  refill  RX name and strength: HYDROcodone-acetaminophen (NORCO)  mg per tablet  Is this a 30-day or 90-day RX:  30-Day  Pharmacy name and location:  [facility] in [place]  Comments:      Phone:  [phone]

## 2020-07-01 NOTE — TELEPHONE ENCOUNTER
DR ADAM PATIENT    Spoke with patient. She received a rx for norco on 3/12/2020 dated for 6/15/2020- when she brought rx to be filled on 2020 she was told that the rx was .    Patient last filled on 2020    Her next rx is dated for 7/15/2020    Please send rx for patient- she only has 1 left

## 2020-07-09 NOTE — TELEPHONE ENCOUNTER
Pt requires a light weight wheel chair for DJD (degenerative joint disease) of knee & impaired mobility. Pts mobility significantly impairs her ability to participate in ADL's like toileting, and grooming within the home, and completeing ADL's within a reasonable time frame. Cane or walker cannot sufficiently resolve pts mobility. Pt is at risk of falls. Home has adequate acces between rooms and maneuvering space. The use of a light weight wheel chair will improve pts ability to participate in ADL's. Pt cannot self propel a standard manual wheelchair. Pt has the strength to propel light weight wheel chair. Pt has a  caregiver who is available, willing and able to provide assistance with the light weight wheel chair.

## 2020-07-16 ENCOUNTER — OFFICE VISIT (OUTPATIENT)
Dept: FAMILY MEDICINE | Facility: CLINIC | Age: 85
End: 2020-07-16
Payer: MEDICARE

## 2020-07-16 VITALS
SYSTOLIC BLOOD PRESSURE: 128 MMHG | BODY MASS INDEX: 24.14 KG/M2 | RESPIRATION RATE: 16 BRPM | WEIGHT: 136.25 LBS | HEART RATE: 78 BPM | DIASTOLIC BLOOD PRESSURE: 62 MMHG | HEIGHT: 63 IN

## 2020-07-16 DIAGNOSIS — M79.605 BILATERAL LEG PAIN: ICD-10-CM

## 2020-07-16 DIAGNOSIS — M79.604 BILATERAL LEG PAIN: ICD-10-CM

## 2020-07-16 DIAGNOSIS — G89.4 CHRONIC PAIN SYNDROME: Primary | ICD-10-CM

## 2020-07-16 DIAGNOSIS — M54.50 LOW BACK PAIN, NON-SPECIFIC: ICD-10-CM

## 2020-07-16 PROCEDURE — 1101F PR PT FALLS ASSESS DOC 0-1 FALLS W/OUT INJ PAST YR: ICD-10-PCS | Mod: CPTII,S$GLB,, | Performed by: FAMILY MEDICINE

## 2020-07-16 PROCEDURE — 99999 PR PBB SHADOW E&M-EST. PATIENT-LVL III: CPT | Mod: PBBFAC,,, | Performed by: FAMILY MEDICINE

## 2020-07-16 PROCEDURE — 1159F PR MEDICATION LIST DOCUMENTED IN MEDICAL RECORD: ICD-10-PCS | Mod: S$GLB,,, | Performed by: FAMILY MEDICINE

## 2020-07-16 PROCEDURE — 99213 PR OFFICE/OUTPT VISIT, EST, LEVL III, 20-29 MIN: ICD-10-PCS | Mod: S$GLB,,, | Performed by: FAMILY MEDICINE

## 2020-07-16 PROCEDURE — 1125F AMNT PAIN NOTED PAIN PRSNT: CPT | Mod: S$GLB,,, | Performed by: FAMILY MEDICINE

## 2020-07-16 PROCEDURE — 99999 PR PBB SHADOW E&M-EST. PATIENT-LVL III: ICD-10-PCS | Mod: PBBFAC,,, | Performed by: FAMILY MEDICINE

## 2020-07-16 PROCEDURE — 1125F PR PAIN SEVERITY QUANTIFIED, PAIN PRESENT: ICD-10-PCS | Mod: S$GLB,,, | Performed by: FAMILY MEDICINE

## 2020-07-16 PROCEDURE — 1159F MED LIST DOCD IN RCRD: CPT | Mod: S$GLB,,, | Performed by: FAMILY MEDICINE

## 2020-07-16 PROCEDURE — 99213 OFFICE O/P EST LOW 20 MIN: CPT | Mod: S$GLB,,, | Performed by: FAMILY MEDICINE

## 2020-07-16 PROCEDURE — 1101F PT FALLS ASSESS-DOCD LE1/YR: CPT | Mod: CPTII,S$GLB,, | Performed by: FAMILY MEDICINE

## 2020-07-16 RX ORDER — CLONIDINE HYDROCHLORIDE 0.1 MG/1
0.1 TABLET ORAL NIGHTLY
Qty: 90 TABLET | Refills: 3 | Status: SHIPPED | OUTPATIENT
Start: 2020-07-16 | End: 2020-12-29 | Stop reason: SDUPTHER

## 2020-07-16 RX ORDER — HYDROCODONE BITARTRATE AND ACETAMINOPHEN 10; 325 MG/1; MG/1
TABLET ORAL
Qty: 120 TABLET | Refills: 0 | Status: SHIPPED | OUTPATIENT
Start: 2020-07-31 | End: 2020-11-10 | Stop reason: SDUPTHER

## 2020-07-16 RX ORDER — GABAPENTIN 300 MG/1
CAPSULE ORAL
Qty: 210 CAPSULE | Refills: 3 | Status: SHIPPED | OUTPATIENT
Start: 2020-07-16 | End: 2021-06-28 | Stop reason: SDUPTHER

## 2020-07-16 RX ORDER — DILTIAZEM HYDROCHLORIDE 240 MG/1
CAPSULE, COATED, EXTENDED RELEASE ORAL
Qty: 90 CAPSULE | Refills: 3 | Status: SHIPPED | OUTPATIENT
Start: 2020-07-16 | End: 2020-12-15 | Stop reason: SDUPTHER

## 2020-07-16 RX ORDER — HYDROCODONE BITARTRATE AND ACETAMINOPHEN 10; 325 MG/1; MG/1
1 TABLET ORAL EVERY 6 HOURS PRN
Qty: 120 TABLET | Refills: 0 | Status: SHIPPED | OUTPATIENT
Start: 2020-10-02 | End: 2020-10-01 | Stop reason: SDUPTHER

## 2020-07-16 RX ORDER — HYDROCODONE BITARTRATE AND ACETAMINOPHEN 10; 325 MG/1; MG/1
1 TABLET ORAL EVERY 6 HOURS PRN
Qty: 60 TABLET | Refills: 0 | Status: SHIPPED | OUTPATIENT
Start: 2020-10-02 | End: 2020-11-10 | Stop reason: SDUPTHER

## 2020-07-16 RX ORDER — HYDROCODONE BITARTRATE AND ACETAMINOPHEN 10; 325 MG/1; MG/1
TABLET ORAL
Qty: 120 TABLET | Refills: 0 | Status: SHIPPED | OUTPATIENT
Start: 2020-09-04 | End: 2020-10-01 | Stop reason: SDUPTHER

## 2020-07-16 RX ORDER — LACTULOSE 10 G/15ML
SOLUTION ORAL
Qty: 473 ML | Refills: 11 | Status: SHIPPED | OUTPATIENT
Start: 2020-07-16 | End: 2021-06-28 | Stop reason: SDUPTHER

## 2020-07-16 NOTE — PROGRESS NOTES
Subjective:       Patient ID: Cheyenne Leach is a 87 y.o. female.    Chief Complaint: Follow-up    Pt is a 87 y.o. female who presents for check up for constipation and chronic pain. Doing well on current meds. Denies any side effects. Prevention is up to date.    Review of Systems   Constitutional: Negative for appetite change, chills and fever.   HENT: Negative for rhinorrhea, sinus pressure, sore throat and trouble swallowing.    Respiratory: Negative for cough, chest tightness, shortness of breath and wheezing.    Cardiovascular: Negative for chest pain and palpitations.   Gastrointestinal: Negative for abdominal pain, blood in stool, diarrhea, nausea and vomiting.        Having BMs   Genitourinary: Negative for dysuria, flank pain, hematuria, pelvic pain, urgency, vaginal bleeding, vaginal discharge and vaginal pain.   Musculoskeletal: Positive for back pain and gait problem. Negative for joint swelling and neck stiffness.        10/10 back pain   Skin: Negative for rash.   Neurological: Negative for dizziness, weakness, light-headedness, numbness and headaches.   Hematological: Does not bruise/bleed easily.   Psychiatric/Behavioral: Negative for agitation. The patient is not nervous/anxious.        Objective:      Physical Exam  Constitutional:       Appearance: She is well-developed.   HENT:      Head: Normocephalic.   Eyes:      Pupils: Pupils are equal, round, and reactive to light.   Neck:      Musculoskeletal: Normal range of motion and neck supple.      Thyroid: No thyromegaly.   Cardiovascular:      Rate and Rhythm: Normal rate and regular rhythm.   Pulmonary:      Effort: No respiratory distress.      Breath sounds: No wheezing or rales.   Chest:      Chest wall: No tenderness.   Abdominal:      General: There is no distension.      Tenderness: There is no abdominal tenderness. There is no guarding or rebound.   Musculoskeletal: Normal range of motion.         General: No tenderness.    Lymphadenopathy:      Cervical: No cervical adenopathy.   Skin:     General: Skin is warm and dry.      Coloration: Skin is not pale.      Findings: No rash.   Neurological:      Mental Status: She is alert and oriented to person, place, and time.      Cranial Nerves: No cranial nerve deficit.      Motor: No abnormal muscle tone.      Coordination: Coordination normal.      Deep Tendon Reflexes: Reflexes are normal and symmetric. Reflexes normal.   Psychiatric:         Thought Content: Thought content normal.         Judgment: Judgment normal.         Assessment:       1. Chronic pain syndrome    2. Low back pain, non-specific    3. Bilateral leg pain        Plan:   Cheyenne was seen today for follow-up.    Diagnoses and all orders for this visit:    Chronic pain syndrome    Low back pain, non-specific    Bilateral leg pain    Other orders  -     cloNIDine (CATAPRES) 0.1 MG tablet; Take 1 tablet (0.1 mg total) by mouth every evening.  -     CONSTULOSE 10 gram/15 mL solution; TAKE 30 ML BY MOUTH THREE TIMES DAILY AS NEEDED  -     diltiaZEM (CARDIZEM CD) 240 MG 24 hr capsule; One po nightly for HTN  -     gabapentin (NEURONTIN) 300 MG capsule; TAKE 2 CAPSULE BY MOUTH BREAKFAST AND LUNCH AND 3 AT BEDTIME  -     HYDROcodone-acetaminophen (NORCO)  mg per tablet; One po qid for knee pain ; opioid is exempt and medically exempt for greater then 7 days  -     HYDROcodone-acetaminophen (NORCO)  mg per tablet; ONE PO Q 6 HRS; opioid id medically exempt & necessary; For greater then 7 days  -     HYDROcodone-acetaminophen (NORCO)  mg per tablet; Take 1 tablet by mouth every 6 (six) hours as needed.

## 2020-07-21 ENCOUNTER — LAB VISIT (OUTPATIENT)
Dept: LAB | Facility: HOSPITAL | Age: 85
End: 2020-07-21
Attending: FAMILY MEDICINE
Payer: MEDICARE

## 2020-07-21 DIAGNOSIS — K59.00 CN (CONSTIPATION): ICD-10-CM

## 2020-07-21 DIAGNOSIS — K21.9 GERD (GASTROESOPHAGEAL REFLUX DISEASE): ICD-10-CM

## 2020-07-21 DIAGNOSIS — R10.9 ABDOMINAL PAIN: Primary | ICD-10-CM

## 2020-07-21 LAB
BASOPHILS NFR BLD: 0 % (ref 0–1.9)
DIFFERENTIAL METHOD: ABNORMAL
EOSINOPHIL NFR BLD: 2 % (ref 0–8)
ERYTHROCYTE [DISTWIDTH] IN BLOOD BY AUTOMATED COUNT: 13.2 % (ref 11.5–14.5)
FERRITIN SERPL-MCNC: 12 NG/ML (ref 20–300)
GIANT PLATELETS BLD QL SMEAR: PRESENT
HCT VFR BLD AUTO: 30.9 % (ref 37–48.5)
HGB BLD-MCNC: 9.8 G/DL (ref 12–16)
IMM GRANULOCYTES # BLD AUTO: ABNORMAL K/UL (ref 0–0.04)
IMM GRANULOCYTES NFR BLD AUTO: ABNORMAL % (ref 0–0.5)
IRON SERPL-MCNC: 50 UG/DL (ref 30–160)
LYMPHOCYTES NFR BLD: 45 % (ref 18–48)
MCH RBC QN AUTO: 30.8 PG (ref 27–31)
MCHC RBC AUTO-ENTMCNC: 31.7 G/DL (ref 32–36)
MCV RBC AUTO: 97 FL (ref 82–98)
MONOCYTES NFR BLD: 8 % (ref 4–15)
NEUTROPHILS NFR BLD: 45 % (ref 38–73)
NRBC BLD-RTO: 0 /100 WBC
PLATELET # BLD AUTO: 158 K/UL (ref 150–350)
PLATELET BLD QL SMEAR: ABNORMAL
PMV BLD AUTO: 9.9 FL (ref 9.2–12.9)
RBC # BLD AUTO: 3.18 M/UL (ref 4–5.4)
RETICS/RBC NFR AUTO: 1.2 % (ref 0.5–2.5)
SATURATED IRON: 11 % (ref 20–50)
TOTAL IRON BINDING CAPACITY: 469 UG/DL (ref 250–450)
TRANSFERRIN SERPL-MCNC: 317 MG/DL (ref 200–375)
WBC # BLD AUTO: 5.11 K/UL (ref 3.9–12.7)

## 2020-07-21 PROCEDURE — 85045 AUTOMATED RETICULOCYTE COUNT: CPT

## 2020-07-21 PROCEDURE — 85027 COMPLETE CBC AUTOMATED: CPT

## 2020-07-21 PROCEDURE — 83540 ASSAY OF IRON: CPT

## 2020-07-21 PROCEDURE — 85007 BL SMEAR W/DIFF WBC COUNT: CPT

## 2020-07-21 PROCEDURE — 36415 COLL VENOUS BLD VENIPUNCTURE: CPT

## 2020-07-21 PROCEDURE — 82728 ASSAY OF FERRITIN: CPT | Mod: GA

## 2020-07-28 NOTE — PROGRESS NOTES
The following lab results were abnormal. The following recommendations were made:Ms Campbell: please see if you can tolerate oral (OTC) SloFe  Daily for an iron vitamin

## 2020-08-04 RX ORDER — AMITRIPTYLINE HYDROCHLORIDE 25 MG/1
TABLET, FILM COATED ORAL
Qty: 30 TABLET | Refills: 5 | Status: SHIPPED | OUTPATIENT
Start: 2020-08-04 | End: 2020-10-01 | Stop reason: SDUPTHER

## 2020-10-01 ENCOUNTER — OFFICE VISIT (OUTPATIENT)
Dept: FAMILY MEDICINE | Facility: CLINIC | Age: 85
End: 2020-10-01
Payer: MEDICARE

## 2020-10-01 VITALS
SYSTOLIC BLOOD PRESSURE: 110 MMHG | HEART RATE: 70 BPM | BODY MASS INDEX: 23.01 KG/M2 | HEIGHT: 63 IN | DIASTOLIC BLOOD PRESSURE: 72 MMHG | RESPIRATION RATE: 13 BRPM | WEIGHT: 129.88 LBS

## 2020-10-01 DIAGNOSIS — G89.4 CHRONIC PAIN SYNDROME: ICD-10-CM

## 2020-10-01 DIAGNOSIS — Z74.09 IMPAIRED MOBILITY: ICD-10-CM

## 2020-10-01 DIAGNOSIS — M79.7 FIBROMYALGIA: Primary | ICD-10-CM

## 2020-10-01 DIAGNOSIS — M54.50 LOW BACK PAIN, NON-SPECIFIC: ICD-10-CM

## 2020-10-01 DIAGNOSIS — G57.90 NEUROPATHY OF LOWER EXTREMITY, UNSPECIFIED LATERALITY: ICD-10-CM

## 2020-10-01 DIAGNOSIS — I10 ESSENTIAL HYPERTENSION: ICD-10-CM

## 2020-10-01 PROCEDURE — 1101F PR PT FALLS ASSESS DOC 0-1 FALLS W/OUT INJ PAST YR: ICD-10-PCS | Mod: CPTII,S$GLB,, | Performed by: FAMILY MEDICINE

## 2020-10-01 PROCEDURE — 96372 PR INJECTION,THERAP/PROPH/DIAG2ST, IM OR SUBCUT: ICD-10-PCS | Mod: S$GLB,,, | Performed by: FAMILY MEDICINE

## 2020-10-01 PROCEDURE — 90694 VACC AIIV4 NO PRSRV 0.5ML IM: CPT | Mod: S$GLB,,, | Performed by: FAMILY MEDICINE

## 2020-10-01 PROCEDURE — 96372 THER/PROPH/DIAG INJ SC/IM: CPT | Mod: S$GLB,,, | Performed by: FAMILY MEDICINE

## 2020-10-01 PROCEDURE — 99999 PR PBB SHADOW E&M-EST. PATIENT-LVL IV: ICD-10-PCS | Mod: PBBFAC,,, | Performed by: FAMILY MEDICINE

## 2020-10-01 PROCEDURE — 99213 PR OFFICE/OUTPT VISIT, EST, LEVL III, 20-29 MIN: ICD-10-PCS | Mod: 25,S$GLB,, | Performed by: FAMILY MEDICINE

## 2020-10-01 PROCEDURE — G0008 ADMIN INFLUENZA VIRUS VAC: HCPCS | Mod: 59,S$GLB,, | Performed by: FAMILY MEDICINE

## 2020-10-01 PROCEDURE — 1126F AMNT PAIN NOTED NONE PRSNT: CPT | Mod: S$GLB,,, | Performed by: FAMILY MEDICINE

## 2020-10-01 PROCEDURE — 1126F PR PAIN SEVERITY QUANTIFIED, NO PAIN PRESENT: ICD-10-PCS | Mod: S$GLB,,, | Performed by: FAMILY MEDICINE

## 2020-10-01 PROCEDURE — 99213 OFFICE O/P EST LOW 20 MIN: CPT | Mod: 25,S$GLB,, | Performed by: FAMILY MEDICINE

## 2020-10-01 PROCEDURE — 1101F PT FALLS ASSESS-DOCD LE1/YR: CPT | Mod: CPTII,S$GLB,, | Performed by: FAMILY MEDICINE

## 2020-10-01 PROCEDURE — 1159F MED LIST DOCD IN RCRD: CPT | Mod: S$GLB,,, | Performed by: FAMILY MEDICINE

## 2020-10-01 PROCEDURE — 1159F PR MEDICATION LIST DOCUMENTED IN MEDICAL RECORD: ICD-10-PCS | Mod: S$GLB,,, | Performed by: FAMILY MEDICINE

## 2020-10-01 PROCEDURE — 99999 PR PBB SHADOW E&M-EST. PATIENT-LVL IV: CPT | Mod: PBBFAC,,, | Performed by: FAMILY MEDICINE

## 2020-10-01 PROCEDURE — G0008 FLU VACCINE - QUADRIVALENT - ADJUVANTED: ICD-10-PCS | Mod: 59,S$GLB,, | Performed by: FAMILY MEDICINE

## 2020-10-01 PROCEDURE — 90694 FLU VACCINE - QUADRIVALENT - ADJUVANTED: ICD-10-PCS | Mod: S$GLB,,, | Performed by: FAMILY MEDICINE

## 2020-10-01 RX ORDER — BACLOFEN 10 MG/1
10 TABLET ORAL 2 TIMES DAILY
Qty: 60 TABLET | Refills: 11 | Status: SHIPPED | OUTPATIENT
Start: 2020-10-01 | End: 2021-06-28 | Stop reason: SDUPTHER

## 2020-10-01 RX ORDER — HYDROCODONE BITARTRATE AND ACETAMINOPHEN 10; 325 MG/1; MG/1
1 TABLET ORAL EVERY 6 HOURS PRN
Qty: 120 TABLET | Refills: 0 | Status: SHIPPED | OUTPATIENT
Start: 2020-10-02 | End: 2021-01-22 | Stop reason: SDUPTHER

## 2020-10-01 RX ORDER — HYDROCODONE BITARTRATE AND ACETAMINOPHEN 10; 325 MG/1; MG/1
TABLET ORAL
Qty: 120 TABLET | Refills: 0 | Status: SHIPPED | OUTPATIENT
Start: 2020-12-01 | End: 2020-12-15 | Stop reason: SDUPTHER

## 2020-10-01 RX ORDER — HYDROCODONE BITARTRATE AND ACETAMINOPHEN 10; 325 MG/1; MG/1
TABLET ORAL
Qty: 120 TABLET | Refills: 0 | Status: SHIPPED | OUTPATIENT
Start: 2020-11-01 | End: 2020-12-15 | Stop reason: SDUPTHER

## 2020-10-01 RX ORDER — AMITRIPTYLINE HYDROCHLORIDE 25 MG/1
25 TABLET, FILM COATED ORAL NIGHTLY
Qty: 30 TABLET | Refills: 11 | Status: SHIPPED | OUTPATIENT
Start: 2020-10-01 | End: 2020-12-15 | Stop reason: SDUPTHER

## 2020-10-01 RX ORDER — MIRABEGRON 25 MG/1
25 TABLET, FILM COATED, EXTENDED RELEASE ORAL DAILY
Qty: 30 TABLET | Refills: 11
Start: 2020-10-01 | End: 2021-06-28 | Stop reason: SDUPTHER

## 2020-10-01 RX ORDER — KETOROLAC TROMETHAMINE 30 MG/ML
30 INJECTION, SOLUTION INTRAMUSCULAR; INTRAVENOUS
Status: COMPLETED | OUTPATIENT
Start: 2020-10-01 | End: 2020-10-01

## 2020-10-01 RX ADMIN — KETOROLAC TROMETHAMINE 30 MG: 30 INJECTION, SOLUTION INTRAMUSCULAR; INTRAVENOUS at 10:10

## 2020-10-01 NOTE — PROGRESS NOTES
Subjective:       Patient ID: Cheyenne Leach is a 87 y.o. female.    Chief Complaint: Follow-up and Immunizations    Pt is a 87 y.o. female who presents for check up for chronic back pain. Doing well on current meds. Denies any side effects. Prevention is up to date.    Review of Systems   Constitutional: Negative for appetite change, chills and fever.   HENT: Negative for rhinorrhea, sinus pressure, sore throat and trouble swallowing.    Respiratory: Negative for cough, chest tightness, shortness of breath and wheezing.    Cardiovascular: Negative for chest pain and palpitations.   Gastrointestinal: Negative for abdominal pain, blood in stool, diarrhea, nausea and vomiting.        Having good reg BMs   Genitourinary: Negative for dysuria, flank pain, hematuria, pelvic pain, urgency, vaginal bleeding, vaginal discharge and vaginal pain.   Musculoskeletal: Positive for back pain. Negative for joint swelling and neck stiffness.        Back pain is terrible   Skin: Negative for rash.   Neurological: Negative for dizziness, weakness, light-headedness, numbness and headaches.        Limited mobility   Hematological: Does not bruise/bleed easily.   Psychiatric/Behavioral: Negative for agitation. The patient is not nervous/anxious.        Objective:      Physical Exam  Constitutional:       Appearance: She is well-developed.   HENT:      Head: Normocephalic.   Eyes:      Pupils: Pupils are equal, round, and reactive to light.   Neck:      Musculoskeletal: Normal range of motion and neck supple.      Thyroid: No thyromegaly.   Cardiovascular:      Rate and Rhythm: Normal rate and regular rhythm.   Pulmonary:      Effort: No respiratory distress.      Breath sounds: No wheezing or rales.   Chest:      Chest wall: No tenderness.   Abdominal:      General: There is no distension.      Tenderness: There is no abdominal tenderness. There is no guarding or rebound.   Musculoskeletal: Normal range of motion.         General: No  tenderness.   Lymphadenopathy:      Cervical: No cervical adenopathy.   Skin:     General: Skin is warm and dry.      Coloration: Skin is not pale.      Findings: No rash.   Neurological:      Mental Status: She is alert and oriented to person, place, and time.      Cranial Nerves: No cranial nerve deficit.      Motor: No abnormal muscle tone.      Coordination: Coordination normal.      Deep Tendon Reflexes: Reflexes are normal and symmetric. Reflexes normal.   Psychiatric:         Thought Content: Thought content normal.         Judgment: Judgment normal.         Assessment:       No diagnosis found.    Plan:   There are no diagnoses linked to this encounter.

## 2020-10-05 ENCOUNTER — HOSPITAL ENCOUNTER (OUTPATIENT)
Dept: RADIOLOGY | Facility: HOSPITAL | Age: 85
Discharge: HOME | End: 2020-10-05
Attending: INTERNAL MEDICINE
Payer: MEDICARE

## 2020-10-05 DIAGNOSIS — R10.9 AP (ABDOMINAL PAIN): ICD-10-CM

## 2020-10-05 DIAGNOSIS — K59.00 CN (CONSTIPATION): ICD-10-CM

## 2020-10-07 ENCOUNTER — HOSPITAL ENCOUNTER (OUTPATIENT)
Dept: RADIOLOGY | Facility: HOSPITAL | Age: 85
Discharge: HOME | End: 2020-10-07
Attending: INTERNAL MEDICINE
Payer: MEDICARE

## 2020-10-07 PROCEDURE — 74248 FL UPPER GI WITH SMALL BOWEL: ICD-10-PCS | Mod: 26,,, | Performed by: RADIOLOGY

## 2020-10-07 PROCEDURE — 74248 X-RAY SM INT F-THRU STD: CPT | Mod: 26,,, | Performed by: RADIOLOGY

## 2020-10-07 PROCEDURE — 25500020 PHARM REV CODE 255: Performed by: RADIOLOGY

## 2020-10-07 PROCEDURE — 74240 X-RAY XM UPR GI TRC 1CNTRST: CPT | Mod: 26,,, | Performed by: RADIOLOGY

## 2020-10-07 PROCEDURE — 74248 X-RAY SM INT F-THRU STD: CPT | Mod: TC

## 2020-10-07 PROCEDURE — 74240 FL UPPER GI WITH SMALL BOWEL: ICD-10-PCS | Mod: 26,,, | Performed by: RADIOLOGY

## 2020-10-07 PROCEDURE — A9698 NON-RAD CONTRAST MATERIALNOC: HCPCS | Performed by: RADIOLOGY

## 2020-10-07 RX ADMIN — BARIUM SULFATE 700 ML: 0.6 SUSPENSION ORAL at 01:10

## 2020-11-10 ENCOUNTER — OFFICE VISIT (OUTPATIENT)
Dept: FAMILY MEDICINE | Facility: CLINIC | Age: 85
End: 2020-11-10
Payer: MEDICARE

## 2020-11-10 VITALS
BODY MASS INDEX: 23.14 KG/M2 | HEART RATE: 72 BPM | DIASTOLIC BLOOD PRESSURE: 84 MMHG | TEMPERATURE: 98 F | HEIGHT: 63 IN | WEIGHT: 130.63 LBS | RESPIRATION RATE: 16 BRPM | SYSTOLIC BLOOD PRESSURE: 138 MMHG

## 2020-11-10 DIAGNOSIS — M79.604 BILATERAL LEG PAIN: ICD-10-CM

## 2020-11-10 DIAGNOSIS — M79.605 BILATERAL LEG PAIN: ICD-10-CM

## 2020-11-10 DIAGNOSIS — G89.4 CHRONIC PAIN SYNDROME: ICD-10-CM

## 2020-11-10 DIAGNOSIS — K59.09 CHRONIC CONSTIPATION: ICD-10-CM

## 2020-11-10 DIAGNOSIS — I10 ESSENTIAL HYPERTENSION: ICD-10-CM

## 2020-11-10 DIAGNOSIS — M54.50 LUMBAR PAIN: Primary | ICD-10-CM

## 2020-11-10 DIAGNOSIS — M79.7 FIBROMYALGIA: ICD-10-CM

## 2020-11-10 PROCEDURE — 1159F PR MEDICATION LIST DOCUMENTED IN MEDICAL RECORD: ICD-10-PCS | Mod: S$GLB,,, | Performed by: FAMILY MEDICINE

## 2020-11-10 PROCEDURE — 1125F PR PAIN SEVERITY QUANTIFIED, PAIN PRESENT: ICD-10-PCS | Mod: S$GLB,,, | Performed by: FAMILY MEDICINE

## 2020-11-10 PROCEDURE — 1159F MED LIST DOCD IN RCRD: CPT | Mod: S$GLB,,, | Performed by: FAMILY MEDICINE

## 2020-11-10 PROCEDURE — 1101F PT FALLS ASSESS-DOCD LE1/YR: CPT | Mod: CPTII,S$GLB,, | Performed by: FAMILY MEDICINE

## 2020-11-10 PROCEDURE — 1125F AMNT PAIN NOTED PAIN PRSNT: CPT | Mod: S$GLB,,, | Performed by: FAMILY MEDICINE

## 2020-11-10 PROCEDURE — 99999 PR PBB SHADOW E&M-EST. PATIENT-LVL IV: ICD-10-PCS | Mod: PBBFAC,,, | Performed by: FAMILY MEDICINE

## 2020-11-10 PROCEDURE — 96372 THER/PROPH/DIAG INJ SC/IM: CPT | Mod: S$GLB,,, | Performed by: FAMILY MEDICINE

## 2020-11-10 PROCEDURE — 1101F PR PT FALLS ASSESS DOC 0-1 FALLS W/OUT INJ PAST YR: ICD-10-PCS | Mod: CPTII,S$GLB,, | Performed by: FAMILY MEDICINE

## 2020-11-10 PROCEDURE — 99999 PR PBB SHADOW E&M-EST. PATIENT-LVL IV: CPT | Mod: PBBFAC,,, | Performed by: FAMILY MEDICINE

## 2020-11-10 PROCEDURE — 99213 OFFICE O/P EST LOW 20 MIN: CPT | Mod: 25,S$GLB,, | Performed by: FAMILY MEDICINE

## 2020-11-10 PROCEDURE — 96372 PR INJECTION,THERAP/PROPH/DIAG2ST, IM OR SUBCUT: ICD-10-PCS | Mod: S$GLB,,, | Performed by: FAMILY MEDICINE

## 2020-11-10 PROCEDURE — 99213 PR OFFICE/OUTPT VISIT, EST, LEVL III, 20-29 MIN: ICD-10-PCS | Mod: 25,S$GLB,, | Performed by: FAMILY MEDICINE

## 2020-11-10 RX ORDER — KETOROLAC TROMETHAMINE 30 MG/ML
30 INJECTION, SOLUTION INTRAMUSCULAR; INTRAVENOUS
Status: COMPLETED | OUTPATIENT
Start: 2020-11-10 | End: 2020-11-10

## 2020-11-10 RX ORDER — HYDROCODONE BITARTRATE AND ACETAMINOPHEN 10; 325 MG/1; MG/1
TABLET ORAL
Qty: 120 TABLET | Refills: 0 | Status: SHIPPED | OUTPATIENT
Start: 2020-11-10 | End: 2020-12-15 | Stop reason: ALTCHOICE

## 2020-11-10 RX ORDER — DICLOFENAC SODIUM 10 MG/G
GEL TOPICAL
Qty: 500 G | Refills: 0 | Status: SHIPPED | OUTPATIENT
Start: 2020-11-10 | End: 2021-06-28 | Stop reason: SDUPTHER

## 2020-11-10 RX ORDER — HYDROCODONE BITARTRATE AND ACETAMINOPHEN 10; 325 MG/1; MG/1
1 TABLET ORAL EVERY 6 HOURS PRN
Qty: 60 TABLET | Refills: 0 | Status: SHIPPED | OUTPATIENT
Start: 2020-12-31 | End: 2020-12-15 | Stop reason: SDUPTHER

## 2020-11-10 RX ADMIN — KETOROLAC TROMETHAMINE 30 MG: 30 INJECTION, SOLUTION INTRAMUSCULAR; INTRAVENOUS at 10:11

## 2020-11-10 NOTE — PROGRESS NOTES
Subjective:       Patient ID: Cheyenne Leach is a 87 y.o. female.    Chief Complaint: Back Pain (lower)    Pt is a 87 y.o. female who presents for check up for chronic back pain. Doing well on current meds. Denies any side effects. Prevention is up to date.    Review of Systems   Constitutional: Negative for appetite change, chills and fever.   HENT: Negative for rhinorrhea, sinus pressure, sore throat and trouble swallowing.    Respiratory: Negative for cough, chest tightness, shortness of breath and wheezing.    Cardiovascular: Negative for chest pain and palpitations.   Gastrointestinal: Negative for abdominal pain, blood in stool, diarrhea, nausea and vomiting.   Genitourinary: Negative for dysuria, flank pain, hematuria, pelvic pain, urgency, vaginal bleeding, vaginal discharge and vaginal pain.   Musculoskeletal: Positive for arthralgias and back pain. Negative for joint swelling and neck stiffness.        R knee is aching and back pain is terrible   Skin: Negative for rash.   Neurological: Negative for dizziness, weakness, light-headedness, numbness and headaches.   Hematological: Does not bruise/bleed easily.   Psychiatric/Behavioral: Negative for agitation. The patient is not nervous/anxious.        Objective:      Physical Exam  Constitutional:       Appearance: She is well-developed.   HENT:      Head: Normocephalic.   Eyes:      Pupils: Pupils are equal, round, and reactive to light.   Neck:      Musculoskeletal: Normal range of motion and neck supple.      Thyroid: No thyromegaly.   Cardiovascular:      Rate and Rhythm: Normal rate and regular rhythm.   Pulmonary:      Effort: No respiratory distress.      Breath sounds: No wheezing or rales.   Chest:      Chest wall: No tenderness.   Abdominal:      General: There is no distension.      Tenderness: There is no abdominal tenderness. There is no guarding or rebound.   Musculoskeletal: Normal range of motion.         General: No tenderness.       Comments: Back pain and R knee   Lymphadenopathy:      Cervical: No cervical adenopathy.   Skin:     General: Skin is warm and dry.      Coloration: Skin is not pale.      Findings: No rash.   Neurological:      Mental Status: She is alert and oriented to person, place, and time.      Cranial Nerves: No cranial nerve deficit.      Motor: No abnormal muscle tone.      Coordination: Coordination normal.      Deep Tendon Reflexes: Reflexes are normal and symmetric. Reflexes normal.   Psychiatric:         Thought Content: Thought content normal.         Judgment: Judgment normal.         Assessment:       No diagnosis found.    Plan:   There are no diagnoses linked to this encounter.

## 2020-11-16 ENCOUNTER — TELEPHONE (OUTPATIENT)
Dept: FAMILY MEDICINE | Facility: CLINIC | Age: 85
End: 2020-11-16

## 2020-11-16 NOTE — TELEPHONE ENCOUNTER
----- Message from Fannie Jason sent at 2020 11:07 AM CST -----  Contact: self  Cheyenne Leach  MRN: [id number]  : 7/3/1933  PCP: Tomasz Lynch  Home Phone      [phone]  Work Phone      Not on file.  Mobile          [phone]      MESSAGE: PT states she is in a lot of pain from her back. Would like to know if something could be called out. Last time something was called out it was misplaced at the pharmacy.   Phone: [phone]  Pharmacy: [facility] in [place]

## 2020-11-16 NOTE — TELEPHONE ENCOUNTER
Spoke with pt and advised her to use a heating pad, the Voltaren gel, and take her Norco as prescribed.    Pt was also instructed to rest and not to do any heavy lifting.

## 2020-11-25 ENCOUNTER — OFFICE VISIT (OUTPATIENT)
Dept: FAMILY MEDICINE | Facility: CLINIC | Age: 85
End: 2020-11-25
Payer: MEDICARE

## 2020-11-25 VITALS
HEIGHT: 63 IN | RESPIRATION RATE: 16 BRPM | DIASTOLIC BLOOD PRESSURE: 62 MMHG | HEART RATE: 89 BPM | WEIGHT: 125.25 LBS | BODY MASS INDEX: 22.19 KG/M2 | SYSTOLIC BLOOD PRESSURE: 102 MMHG

## 2020-11-25 DIAGNOSIS — R30.0 DYSURIA: Primary | ICD-10-CM

## 2020-11-25 PROCEDURE — 81000 POCT URINE SEDIMENT EXAM: ICD-10-PCS | Mod: S$GLB,,, | Performed by: FAMILY MEDICINE

## 2020-11-25 PROCEDURE — 99999 PR PBB SHADOW E&M-EST. PATIENT-LVL IV: CPT | Mod: PBBFAC,,, | Performed by: FAMILY MEDICINE

## 2020-11-25 PROCEDURE — 1125F PR PAIN SEVERITY QUANTIFIED, PAIN PRESENT: ICD-10-PCS | Mod: S$GLB,ICN,, | Performed by: FAMILY MEDICINE

## 2020-11-25 PROCEDURE — 1157F PR ADVANCE CARE PLAN OR EQUIV PRESENT IN MEDICAL RECORD: ICD-10-PCS | Mod: S$GLB,ICN,, | Performed by: FAMILY MEDICINE

## 2020-11-25 PROCEDURE — 3288F PR FALLS RISK ASSESSMENT DOCUMENTED: ICD-10-PCS | Mod: CPTII,S$GLB,ICN, | Performed by: FAMILY MEDICINE

## 2020-11-25 PROCEDURE — 1125F AMNT PAIN NOTED PAIN PRSNT: CPT | Mod: S$GLB,ICN,, | Performed by: FAMILY MEDICINE

## 2020-11-25 PROCEDURE — 3288F FALL RISK ASSESSMENT DOCD: CPT | Mod: CPTII,S$GLB,ICN, | Performed by: FAMILY MEDICINE

## 2020-11-25 PROCEDURE — 87086 URINE CULTURE/COLONY COUNT: CPT

## 2020-11-25 PROCEDURE — 81000 URINALYSIS NONAUTO W/SCOPE: CPT | Mod: S$GLB,,, | Performed by: FAMILY MEDICINE

## 2020-11-25 PROCEDURE — 99999 PR PBB SHADOW E&M-EST. PATIENT-LVL IV: ICD-10-PCS | Mod: PBBFAC,,, | Performed by: FAMILY MEDICINE

## 2020-11-25 PROCEDURE — 99213 PR OFFICE/OUTPT VISIT, EST, LEVL III, 20-29 MIN: ICD-10-PCS | Mod: 25,S$GLB,ICN, | Performed by: FAMILY MEDICINE

## 2020-11-25 PROCEDURE — 99213 OFFICE O/P EST LOW 20 MIN: CPT | Mod: 25,S$GLB,ICN, | Performed by: FAMILY MEDICINE

## 2020-11-25 PROCEDURE — 1101F PR PT FALLS ASSESS DOC 0-1 FALLS W/OUT INJ PAST YR: ICD-10-PCS | Mod: CPTII,S$GLB,ICN, | Performed by: FAMILY MEDICINE

## 2020-11-25 PROCEDURE — 1157F ADVNC CARE PLAN IN RCRD: CPT | Mod: S$GLB,ICN,, | Performed by: FAMILY MEDICINE

## 2020-11-25 PROCEDURE — 1159F PR MEDICATION LIST DOCUMENTED IN MEDICAL RECORD: ICD-10-PCS | Mod: S$GLB,ICN,, | Performed by: FAMILY MEDICINE

## 2020-11-25 PROCEDURE — 1159F MED LIST DOCD IN RCRD: CPT | Mod: S$GLB,ICN,, | Performed by: FAMILY MEDICINE

## 2020-11-25 PROCEDURE — 1101F PT FALLS ASSESS-DOCD LE1/YR: CPT | Mod: CPTII,S$GLB,ICN, | Performed by: FAMILY MEDICINE

## 2020-11-25 RX ORDER — CIPROFLOXACIN 500 MG/1
500 TABLET ORAL 2 TIMES DAILY
Qty: 14 TABLET | Refills: 0 | Status: SHIPPED | OUTPATIENT
Start: 2020-11-25 | End: 2020-12-02

## 2020-11-25 RX ORDER — PHENAZOPYRIDINE HYDROCHLORIDE 100 MG/1
100 TABLET, FILM COATED ORAL 3 TIMES DAILY PRN
Qty: 10 TABLET | Refills: 0 | Status: SHIPPED | OUTPATIENT
Start: 2020-11-25 | End: 2020-11-27 | Stop reason: SDUPTHER

## 2020-11-25 NOTE — PROGRESS NOTES
uSubjective:       Patient ID: Cheyenne Leach is a 87 y.o. female.    Chief Complaint: Urinary Tract Infection (back pain. frequency. burning. )    HPI  87 year old female comes in with c/o bladder pressure. She says that she had an orange about 3 days ago. She has had pressure since then. However over the last 2 days she has started with frequency and nocturia x 3-4. No fevers. No vomiting.    PMH, PSH, ALLERGIES, SH, FH reviewed in nurse's notes above  Medications reconciled in the nurse's notes      Review of Systems   Constitutional: Negative for chills and fever.   HENT: Negative for congestion, ear pain, postnasal drip, rhinorrhea, sore throat and trouble swallowing.    Eyes: Negative for redness and itching.   Respiratory: Negative for cough, shortness of breath and wheezing.    Cardiovascular: Negative for chest pain and palpitations.   Gastrointestinal: Negative for abdominal pain, diarrhea, nausea and vomiting.   Genitourinary: Positive for difficulty urinating, dysuria, pelvic pain and urgency. Negative for frequency.   Musculoskeletal: Positive for back pain.   Skin: Negative for rash.   Neurological: Negative for weakness and headaches.       Objective:      Physical Exam  Vitals signs and nursing note reviewed.   Constitutional:       General: She is not in acute distress.     Appearance: She is well-developed.   HENT:      Head: Normocephalic and atraumatic.   Eyes:      Conjunctiva/sclera: Conjunctivae normal.      Pupils: Pupils are equal, round, and reactive to light.   Neck:      Musculoskeletal: Normal range of motion and neck supple.      Thyroid: No thyromegaly.   Cardiovascular:      Rate and Rhythm: Normal rate and regular rhythm.      Heart sounds: Murmur present.   Pulmonary:      Effort: Pulmonary effort is normal. No respiratory distress.      Breath sounds: Normal breath sounds. No wheezing.   Abdominal:      General: Bowel sounds are normal.      Palpations: Abdomen is soft.       Tenderness: There is no abdominal tenderness.      Comments: +suprapubic tenderness   Musculoskeletal: Normal range of motion.   Lymphadenopathy:      Cervical: No cervical adenopathy.   Skin:     General: Skin is warm and dry.      Findings: No rash.   Neurological:      Mental Status: She is alert and oriented to person, place, and time.      Comments: Antalgic gait   Psychiatric:         Behavior: Behavior normal.          Assessment/Plan:       Problem List Items Addressed This Visit     None      Visit Diagnoses     Dysuria    -  Primary    Relevant Medications    ciprofloxacin HCl (CIPRO) 500 MG tablet    phenazopyridine (PYRIDIUM) 100 MG tablet    Other Relevant Orders    Urine culture    POCT URINE DIPSTICK WITH MICROSCOPE, AUTOMATED    POCT Urine Sediment Exam        RTC if condition acutely worsens or any other concerns, otherwise RTC as scheduled

## 2020-11-27 ENCOUNTER — OFFICE VISIT (OUTPATIENT)
Dept: FAMILY MEDICINE | Facility: CLINIC | Age: 85
End: 2020-11-27
Payer: MEDICARE

## 2020-11-27 VITALS
HEART RATE: 82 BPM | HEIGHT: 63 IN | WEIGHT: 123.69 LBS | BODY MASS INDEX: 21.91 KG/M2 | SYSTOLIC BLOOD PRESSURE: 118 MMHG | DIASTOLIC BLOOD PRESSURE: 72 MMHG | RESPIRATION RATE: 12 BRPM

## 2020-11-27 DIAGNOSIS — M79.7 FIBROMYALGIA: ICD-10-CM

## 2020-11-27 DIAGNOSIS — Z74.09 IMPAIRED MOBILITY: ICD-10-CM

## 2020-11-27 DIAGNOSIS — N30.01 ACUTE CYSTITIS WITH HEMATURIA: ICD-10-CM

## 2020-11-27 DIAGNOSIS — R30.0 DYSURIA: ICD-10-CM

## 2020-11-27 DIAGNOSIS — I10 ESSENTIAL HYPERTENSION: Primary | ICD-10-CM

## 2020-11-27 DIAGNOSIS — M54.50 LUMBAR PAIN: ICD-10-CM

## 2020-11-27 LAB
BACTERIA SPEC CULT: NORMAL
BACTERIA SPEC CULT: NORMAL /HPF
BACTERIA UR CULT: NORMAL
BACTERIA UR CULT: NORMAL
BILIRUB SERPL-MCNC: NORMAL MG/DL
BILIRUB SERPL-MCNC: NORMAL MG/DL
BLOOD URINE, POC: NORMAL
BLOOD URINE, POC: NORMAL
CASTS: NORMAL
CASTS: NORMAL
COLOR, POC UA: NORMAL
COLOR, POC UA: NORMAL
CRYSTALS: NORMAL
CRYSTALS: NORMAL
GLUCOSE UR QL STRIP: NORMAL
GLUCOSE UR QL STRIP: NORMAL
KETONES UR QL STRIP: NORMAL
KETONES UR QL STRIP: NORMAL
LEUKOCYTE ESTERASE URINE, POC: NORMAL
LEUKOCYTE ESTERASE URINE, POC: NORMAL
NITRITE, POC UA: NORMAL
NITRITE, POC UA: NORMAL
PH, POC UA: 5
PH, POC UA: 5
PROTEIN, POC: NORMAL
PROTEIN, POC: NORMAL
RBC CELLS COUNTED: NORMAL
RBC CELLS COUNTED: NORMAL
SPECIFIC GRAVITY, POC UA: 1.01
SPECIFIC GRAVITY, POC UA: 1.02
UROBILINOGEN, POC UA: NORMAL
UROBILINOGEN, POC UA: NORMAL
WHITE BLOOD CELLS: NORMAL
WHITE BLOOD CELLS: NORMAL

## 2020-11-27 PROCEDURE — 1126F PR PAIN SEVERITY QUANTIFIED, NO PAIN PRESENT: ICD-10-PCS | Mod: S$GLB,,, | Performed by: FAMILY MEDICINE

## 2020-11-27 PROCEDURE — 3288F FALL RISK ASSESSMENT DOCD: CPT | Mod: CPTII,S$GLB,, | Performed by: FAMILY MEDICINE

## 2020-11-27 PROCEDURE — 96372 PR INJECTION,THERAP/PROPH/DIAG2ST, IM OR SUBCUT: ICD-10-PCS | Mod: S$GLB,,, | Performed by: FAMILY MEDICINE

## 2020-11-27 PROCEDURE — 1157F PR ADVANCE CARE PLAN OR EQUIV PRESENT IN MEDICAL RECORD: ICD-10-PCS | Mod: S$GLB,,, | Performed by: FAMILY MEDICINE

## 2020-11-27 PROCEDURE — 99213 OFFICE O/P EST LOW 20 MIN: CPT | Mod: 25,S$GLB,, | Performed by: FAMILY MEDICINE

## 2020-11-27 PROCEDURE — 99999 PR PBB SHADOW E&M-EST. PATIENT-LVL IV: CPT | Mod: PBBFAC,,, | Performed by: FAMILY MEDICINE

## 2020-11-27 PROCEDURE — 1101F PT FALLS ASSESS-DOCD LE1/YR: CPT | Mod: CPTII,S$GLB,, | Performed by: FAMILY MEDICINE

## 2020-11-27 PROCEDURE — 81000 POCT URINE SEDIMENT EXAM: ICD-10-PCS | Mod: S$GLB,,, | Performed by: FAMILY MEDICINE

## 2020-11-27 PROCEDURE — 1159F MED LIST DOCD IN RCRD: CPT | Mod: S$GLB,,, | Performed by: FAMILY MEDICINE

## 2020-11-27 PROCEDURE — 81000 URINALYSIS NONAUTO W/SCOPE: CPT | Mod: S$GLB,,, | Performed by: FAMILY MEDICINE

## 2020-11-27 PROCEDURE — 96372 THER/PROPH/DIAG INJ SC/IM: CPT | Mod: S$GLB,,, | Performed by: FAMILY MEDICINE

## 2020-11-27 PROCEDURE — 1126F AMNT PAIN NOTED NONE PRSNT: CPT | Mod: S$GLB,,, | Performed by: FAMILY MEDICINE

## 2020-11-27 PROCEDURE — 1157F ADVNC CARE PLAN IN RCRD: CPT | Mod: S$GLB,,, | Performed by: FAMILY MEDICINE

## 2020-11-27 PROCEDURE — 3288F PR FALLS RISK ASSESSMENT DOCUMENTED: ICD-10-PCS | Mod: CPTII,S$GLB,, | Performed by: FAMILY MEDICINE

## 2020-11-27 PROCEDURE — 1101F PR PT FALLS ASSESS DOC 0-1 FALLS W/OUT INJ PAST YR: ICD-10-PCS | Mod: CPTII,S$GLB,, | Performed by: FAMILY MEDICINE

## 2020-11-27 PROCEDURE — 1159F PR MEDICATION LIST DOCUMENTED IN MEDICAL RECORD: ICD-10-PCS | Mod: S$GLB,,, | Performed by: FAMILY MEDICINE

## 2020-11-27 PROCEDURE — 99213 PR OFFICE/OUTPT VISIT, EST, LEVL III, 20-29 MIN: ICD-10-PCS | Mod: 25,S$GLB,, | Performed by: FAMILY MEDICINE

## 2020-11-27 PROCEDURE — 99999 PR PBB SHADOW E&M-EST. PATIENT-LVL IV: ICD-10-PCS | Mod: PBBFAC,,, | Performed by: FAMILY MEDICINE

## 2020-11-27 RX ORDER — DOXYCYCLINE HYCLATE 100 MG
100 TABLET ORAL 2 TIMES DAILY
Qty: 14 TABLET | Refills: 0 | Status: SHIPPED | OUTPATIENT
Start: 2020-11-27 | End: 2020-12-07

## 2020-11-27 RX ORDER — KETOROLAC TROMETHAMINE 30 MG/ML
30 INJECTION, SOLUTION INTRAMUSCULAR; INTRAVENOUS
Status: COMPLETED | OUTPATIENT
Start: 2020-11-27 | End: 2020-11-27

## 2020-11-27 RX ORDER — PHENAZOPYRIDINE HYDROCHLORIDE 100 MG/1
TABLET, FILM COATED ORAL
Qty: 21 TABLET | Refills: 0 | Status: SHIPPED | OUTPATIENT
Start: 2020-11-27 | End: 2021-07-26 | Stop reason: SDUPTHER

## 2020-11-27 RX ADMIN — KETOROLAC TROMETHAMINE 30 MG: 30 INJECTION, SOLUTION INTRAMUSCULAR; INTRAVENOUS at 02:11

## 2020-11-27 NOTE — PROGRESS NOTES
Subjective:       Patient ID: Cheyenne Leach is a 87 y.o. female.    Chief Complaint: Urinary Tract Infection    Pt is a 87 y.o. female who presents for check up for Essential hypertension  (primary encounter diagnosis)  Acute cystitis with hematuria  Impaired mobility  Lumbar pain  Fibromyalgia  Dysuria. Doing well on current meds. Denies any side effects. Prevention is up to date    Review of Systems   Constitutional: Negative for appetite change, chills and fever.   HENT: Negative for rhinorrhea, sinus pressure, sore throat and trouble swallowing.    Respiratory: Negative for cough, chest tightness, shortness of breath and wheezing.    Cardiovascular: Negative for chest pain and palpitations.   Gastrointestinal: Negative for abdominal pain, blood in stool, diarrhea, nausea and vomiting.        Having good BMs   Genitourinary: Positive for dysuria. Negative for flank pain, hematuria, pelvic pain, urgency, vaginal bleeding, vaginal discharge and vaginal pain.        Nocturia X 3-4   Musculoskeletal: Negative for back pain, joint swelling and neck stiffness.   Skin: Negative for rash.   Neurological: Negative for dizziness, weakness, light-headedness, numbness and headaches.   Hematological: Does not bruise/bleed easily.   Psychiatric/Behavioral: Negative for agitation. The patient is not nervous/anxious.        Objective:      Physical Exam  Constitutional:       Appearance: She is well-developed.   HENT:      Head: Normocephalic.   Eyes:      Pupils: Pupils are equal, round, and reactive to light.   Neck:      Musculoskeletal: Normal range of motion and neck supple.      Thyroid: No thyromegaly.   Cardiovascular:      Rate and Rhythm: Normal rate and regular rhythm.   Pulmonary:      Effort: No respiratory distress.      Breath sounds: No wheezing or rales.   Chest:      Chest wall: No tenderness.   Abdominal:      General: There is no distension.      Tenderness: There is no abdominal tenderness. There is no  guarding or rebound.      Comments: Suprapubic tenderness   Musculoskeletal: Normal range of motion.         General: No tenderness.   Lymphadenopathy:      Cervical: No cervical adenopathy.   Skin:     General: Skin is warm and dry.      Coloration: Skin is not pale.      Findings: No rash.   Neurological:      Mental Status: She is alert and oriented to person, place, and time.      Cranial Nerves: No cranial nerve deficit.      Motor: No abnormal muscle tone.      Coordination: Coordination normal.      Deep Tendon Reflexes: Reflexes are normal and symmetric. Reflexes normal.   Psychiatric:         Thought Content: Thought content normal.         Judgment: Judgment normal.         Assessment:       1. Essential hypertension    2. Acute cystitis with hematuria    3. Impaired mobility    4. Lumbar pain    5. Fibromyalgia    6. Dysuria        Plan:   Cheyenne was seen today for urinary tract infection.    Diagnoses and all orders for this visit:    Essential hypertension    Acute cystitis with hematuria  -     phenazopyridine (PYRIDIUM) 100 MG tablet; One po tid for bladder pain  -     POCT urinalysis, dipstick or tablet reag  -     POCT URINE SEDIMENT EXAM    Impaired mobility    Lumbar pain    Fibromyalgia    Dysuria  -     phenazopyridine (PYRIDIUM) 100 MG tablet; One po tid for bladder pain  -     POCT urinalysis, dipstick or tablet reag  -     POCT URINE SEDIMENT EXAM

## 2020-12-07 ENCOUNTER — OFFICE VISIT (OUTPATIENT)
Dept: FAMILY MEDICINE | Facility: CLINIC | Age: 85
End: 2020-12-07
Payer: MEDICARE

## 2020-12-07 VITALS
BODY MASS INDEX: 20.94 KG/M2 | HEART RATE: 102 BPM | RESPIRATION RATE: 20 BRPM | WEIGHT: 118.19 LBS | DIASTOLIC BLOOD PRESSURE: 88 MMHG | TEMPERATURE: 97 F | HEIGHT: 63 IN | SYSTOLIC BLOOD PRESSURE: 140 MMHG

## 2020-12-07 DIAGNOSIS — R11.10 VOMITING, INTRACTABILITY OF VOMITING NOT SPECIFIED, PRESENCE OF NAUSEA NOT SPECIFIED, UNSPECIFIED VOMITING TYPE: Primary | ICD-10-CM

## 2020-12-07 DIAGNOSIS — R10.30 LOWER ABDOMINAL PAIN: ICD-10-CM

## 2020-12-07 PROCEDURE — 99999 PR PBB SHADOW E&M-EST. PATIENT-LVL V: CPT | Mod: PBBFAC,,, | Performed by: NURSE PRACTITIONER

## 2020-12-07 PROCEDURE — 3288F PR FALLS RISK ASSESSMENT DOCUMENTED: ICD-10-PCS | Mod: CPTII,S$GLB,, | Performed by: NURSE PRACTITIONER

## 2020-12-07 PROCEDURE — 99999 PR PBB SHADOW E&M-EST. PATIENT-LVL V: ICD-10-PCS | Mod: PBBFAC,,, | Performed by: NURSE PRACTITIONER

## 2020-12-07 PROCEDURE — 1157F ADVNC CARE PLAN IN RCRD: CPT | Mod: S$GLB,,, | Performed by: NURSE PRACTITIONER

## 2020-12-07 PROCEDURE — 1125F PR PAIN SEVERITY QUANTIFIED, PAIN PRESENT: ICD-10-PCS | Mod: S$GLB,,, | Performed by: NURSE PRACTITIONER

## 2020-12-07 PROCEDURE — 1157F PR ADVANCE CARE PLAN OR EQUIV PRESENT IN MEDICAL RECORD: ICD-10-PCS | Mod: S$GLB,,, | Performed by: NURSE PRACTITIONER

## 2020-12-07 PROCEDURE — 96372 THER/PROPH/DIAG INJ SC/IM: CPT | Mod: S$GLB,,, | Performed by: NURSE PRACTITIONER

## 2020-12-07 PROCEDURE — 1159F PR MEDICATION LIST DOCUMENTED IN MEDICAL RECORD: ICD-10-PCS | Mod: S$GLB,,, | Performed by: NURSE PRACTITIONER

## 2020-12-07 PROCEDURE — 96372 PR INJECTION,THERAP/PROPH/DIAG2ST, IM OR SUBCUT: ICD-10-PCS | Mod: S$GLB,,, | Performed by: NURSE PRACTITIONER

## 2020-12-07 PROCEDURE — 99213 PR OFFICE/OUTPT VISIT, EST, LEVL III, 20-29 MIN: ICD-10-PCS | Mod: 25,S$GLB,, | Performed by: NURSE PRACTITIONER

## 2020-12-07 PROCEDURE — 1159F MED LIST DOCD IN RCRD: CPT | Mod: S$GLB,,, | Performed by: NURSE PRACTITIONER

## 2020-12-07 PROCEDURE — 99213 OFFICE O/P EST LOW 20 MIN: CPT | Mod: 25,S$GLB,, | Performed by: NURSE PRACTITIONER

## 2020-12-07 PROCEDURE — 1101F PR PT FALLS ASSESS DOC 0-1 FALLS W/OUT INJ PAST YR: ICD-10-PCS | Mod: CPTII,S$GLB,, | Performed by: NURSE PRACTITIONER

## 2020-12-07 PROCEDURE — 1101F PT FALLS ASSESS-DOCD LE1/YR: CPT | Mod: CPTII,S$GLB,, | Performed by: NURSE PRACTITIONER

## 2020-12-07 PROCEDURE — 1125F AMNT PAIN NOTED PAIN PRSNT: CPT | Mod: S$GLB,,, | Performed by: NURSE PRACTITIONER

## 2020-12-07 PROCEDURE — 3288F FALL RISK ASSESSMENT DOCD: CPT | Mod: CPTII,S$GLB,, | Performed by: NURSE PRACTITIONER

## 2020-12-07 RX ORDER — ONDANSETRON 2 MG/ML
4 INJECTION INTRAMUSCULAR; INTRAVENOUS
Status: DISCONTINUED | OUTPATIENT
Start: 2020-12-07 | End: 2020-12-07

## 2020-12-07 RX ORDER — ONDANSETRON 2 MG/ML
4 INJECTION INTRAMUSCULAR; INTRAVENOUS
Status: COMPLETED | OUTPATIENT
Start: 2020-12-07 | End: 2020-12-07

## 2020-12-07 RX ADMIN — ONDANSETRON 4 MG: 2 INJECTION INTRAMUSCULAR; INTRAVENOUS at 03:12

## 2020-12-07 NOTE — PROGRESS NOTES
Subjective:       Patient ID: Cheyenne Leach is a 87 y.o. female.    Chief Complaint: Urinary Tract Infection    Emesis since yesterday evening. Can't keep anything down. Treated for UTI on 11/27.      Emesis   This is a new problem. The current episode started yesterday. There has been no fever. Associated symptoms include abdominal pain (lower abdomen/pelvis). Pertinent negatives include no arthralgias, coughing, decreased urine volume, diarrhea, dizziness, fever, headaches or myalgias.     Review of Systems   Constitutional: Negative.  Negative for appetite change, fatigue and fever. Unexpected weight change: weight down 5 pounds since 11/27.   HENT: Negative.  Negative for congestion, ear pain and sore throat.    Eyes: Negative.  Negative for visual disturbance.   Respiratory: Negative.  Negative for cough, shortness of breath and wheezing.    Cardiovascular: Negative.    Gastrointestinal: Positive for abdominal pain (lower abdomen/pelvis) and vomiting. Negative for diarrhea and nausea.   Endocrine: Negative.    Genitourinary: Negative.  Negative for decreased urine volume, difficulty urinating and urgency.   Musculoskeletal: Negative.  Negative for arthralgias and myalgias.   Skin: Negative.  Negative for color change.   Allergic/Immunologic: Negative.    Neurological: Negative.  Negative for dizziness and headaches.   Hematological: Negative.    Psychiatric/Behavioral: Negative.  Negative for sleep disturbance.   All other systems reviewed and are negative.      Objective:      Physical Exam  Vitals signs and nursing note reviewed.   Constitutional:       General: She is not in acute distress.     Appearance: She is well-developed.      Comments: Seem mildly distressed with pain in the lower abdomen   HENT:      Head: Normocephalic and atraumatic.   Eyes:      Pupils: Pupils are equal, round, and reactive to light.   Neck:      Musculoskeletal: Normal range of motion and neck supple.   Cardiovascular:       Rate and Rhythm: Normal rate and regular rhythm.      Pulses: Normal pulses.      Heart sounds: Murmur present.   Pulmonary:      Effort: Pulmonary effort is normal. No respiratory distress.      Breath sounds: Normal breath sounds.   Abdominal:      General: Abdomen is flat. Bowel sounds are normal.      Palpations: Abdomen is soft.      Tenderness: There is abdominal tenderness (at the lower abdomen).   Skin:     General: Skin is warm and dry.   Neurological:      Mental Status: She is alert and oriented to person, place, and time.   Psychiatric:         Mood and Affect: Mood normal.         Assessment:       1. Vomiting, intractability of vomiting not specified, presence of nausea not specified, unspecified vomiting type    2. Lower abdominal pain        Plan:   Cheyenne was seen today for urinary tract infection.    Diagnoses and all orders for this visit:    Vomiting, intractability of vomiting not specified, presence of nausea not specified, unspecified vomiting type  -     Discontinue: ondansetron injection 4 mg  -     ondansetron injection 4 mg    Lower abdominal pain    Dr. Lynch to see patient. She had a long hospitalization with an obstructed bowel and ileus in the not too disatant past. He suggested she go to [facility] where she was treated before. Left in private vehicle with her niece

## 2020-12-15 ENCOUNTER — OFFICE VISIT (OUTPATIENT)
Dept: FAMILY MEDICINE | Facility: CLINIC | Age: 85
End: 2020-12-15
Payer: MEDICARE

## 2020-12-15 VITALS
DIASTOLIC BLOOD PRESSURE: 86 MMHG | RESPIRATION RATE: 12 BRPM | HEART RATE: 80 BPM | SYSTOLIC BLOOD PRESSURE: 150 MMHG | WEIGHT: 121.5 LBS | BODY MASS INDEX: 21.53 KG/M2 | TEMPERATURE: 97 F | HEIGHT: 63 IN

## 2020-12-15 DIAGNOSIS — N30.01 ACUTE CYSTITIS WITH HEMATURIA: Primary | ICD-10-CM

## 2020-12-15 DIAGNOSIS — R10.30 LOWER ABDOMINAL PAIN: ICD-10-CM

## 2020-12-15 DIAGNOSIS — Z74.09 IMPAIRED MOBILITY: ICD-10-CM

## 2020-12-15 DIAGNOSIS — I10 ESSENTIAL HYPERTENSION: ICD-10-CM

## 2020-12-15 PROCEDURE — 1159F PR MEDICATION LIST DOCUMENTED IN MEDICAL RECORD: ICD-10-PCS | Mod: S$GLB,,, | Performed by: FAMILY MEDICINE

## 2020-12-15 PROCEDURE — 1157F PR ADVANCE CARE PLAN OR EQUIV PRESENT IN MEDICAL RECORD: ICD-10-PCS | Mod: S$GLB,,, | Performed by: FAMILY MEDICINE

## 2020-12-15 PROCEDURE — 1101F PT FALLS ASSESS-DOCD LE1/YR: CPT | Mod: CPTII,S$GLB,, | Performed by: FAMILY MEDICINE

## 2020-12-15 PROCEDURE — 99999 PR PBB SHADOW E&M-EST. PATIENT-LVL IV: ICD-10-PCS | Mod: PBBFAC,,, | Performed by: FAMILY MEDICINE

## 2020-12-15 PROCEDURE — 1126F AMNT PAIN NOTED NONE PRSNT: CPT | Mod: S$GLB,,, | Performed by: FAMILY MEDICINE

## 2020-12-15 PROCEDURE — 3288F FALL RISK ASSESSMENT DOCD: CPT | Mod: CPTII,S$GLB,, | Performed by: FAMILY MEDICINE

## 2020-12-15 PROCEDURE — 99213 OFFICE O/P EST LOW 20 MIN: CPT | Mod: 25,S$GLB,, | Performed by: FAMILY MEDICINE

## 2020-12-15 PROCEDURE — 3288F PR FALLS RISK ASSESSMENT DOCUMENTED: ICD-10-PCS | Mod: CPTII,S$GLB,, | Performed by: FAMILY MEDICINE

## 2020-12-15 PROCEDURE — 1126F PR PAIN SEVERITY QUANTIFIED, NO PAIN PRESENT: ICD-10-PCS | Mod: S$GLB,,, | Performed by: FAMILY MEDICINE

## 2020-12-15 PROCEDURE — 1101F PR PT FALLS ASSESS DOC 0-1 FALLS W/OUT INJ PAST YR: ICD-10-PCS | Mod: CPTII,S$GLB,, | Performed by: FAMILY MEDICINE

## 2020-12-15 PROCEDURE — 96372 PR INJECTION,THERAP/PROPH/DIAG2ST, IM OR SUBCUT: ICD-10-PCS | Mod: S$GLB,,, | Performed by: FAMILY MEDICINE

## 2020-12-15 PROCEDURE — 99213 PR OFFICE/OUTPT VISIT, EST, LEVL III, 20-29 MIN: ICD-10-PCS | Mod: 25,S$GLB,, | Performed by: FAMILY MEDICINE

## 2020-12-15 PROCEDURE — 99999 PR PBB SHADOW E&M-EST. PATIENT-LVL IV: CPT | Mod: PBBFAC,,, | Performed by: FAMILY MEDICINE

## 2020-12-15 PROCEDURE — 1159F MED LIST DOCD IN RCRD: CPT | Mod: S$GLB,,, | Performed by: FAMILY MEDICINE

## 2020-12-15 PROCEDURE — 96372 THER/PROPH/DIAG INJ SC/IM: CPT | Mod: S$GLB,,, | Performed by: FAMILY MEDICINE

## 2020-12-15 PROCEDURE — 1157F ADVNC CARE PLAN IN RCRD: CPT | Mod: S$GLB,,, | Performed by: FAMILY MEDICINE

## 2020-12-15 RX ORDER — ONDANSETRON 4 MG/1
TABLET, ORALLY DISINTEGRATING ORAL
COMMUNITY
Start: 2020-12-08

## 2020-12-15 RX ORDER — HYDROCODONE BITARTRATE AND ACETAMINOPHEN 10; 325 MG/1; MG/1
1 TABLET ORAL EVERY 6 HOURS PRN
Qty: 60 TABLET | Refills: 0 | Status: SHIPPED | OUTPATIENT
Start: 2020-12-31 | End: 2021-03-08 | Stop reason: SDUPTHER

## 2020-12-15 RX ORDER — DILTIAZEM HYDROCHLORIDE 240 MG/1
CAPSULE, COATED, EXTENDED RELEASE ORAL
Qty: 90 CAPSULE | Refills: 3 | Status: SHIPPED | OUTPATIENT
Start: 2020-12-15 | End: 2021-06-28 | Stop reason: SDUPTHER

## 2020-12-15 RX ORDER — LEVOTHYROXINE SODIUM 50 UG/1
TABLET ORAL
Qty: 30 TABLET | Refills: 2 | Status: SHIPPED | OUTPATIENT
Start: 2020-12-15 | End: 2021-07-26 | Stop reason: SDUPTHER

## 2020-12-15 RX ORDER — AMITRIPTYLINE HYDROCHLORIDE 25 MG/1
25 TABLET, FILM COATED ORAL NIGHTLY
Qty: 30 TABLET | Refills: 11 | Status: SHIPPED | OUTPATIENT
Start: 2020-12-15 | End: 2021-06-28 | Stop reason: SDUPTHER

## 2020-12-15 RX ORDER — HYDROCODONE BITARTRATE AND ACETAMINOPHEN 10; 325 MG/1; MG/1
TABLET ORAL
Qty: 120 TABLET | Refills: 0 | Status: SHIPPED | OUTPATIENT
Start: 2020-12-15 | End: 2020-12-29 | Stop reason: SDUPTHER

## 2020-12-15 RX ORDER — HYDROCODONE BITARTRATE AND ACETAMINOPHEN 10; 325 MG/1; MG/1
TABLET ORAL
Qty: 120 TABLET | Refills: 0 | Status: SHIPPED | OUTPATIENT
Start: 2020-12-15 | End: 2021-01-22 | Stop reason: SDUPTHER

## 2020-12-15 RX ORDER — BUMETANIDE 1 MG/1
1 TABLET ORAL DAILY
Qty: 30 TABLET | Refills: 11 | Status: SHIPPED | OUTPATIENT
Start: 2020-12-15 | End: 2021-06-28 | Stop reason: SDUPTHER

## 2020-12-15 RX ORDER — KETOROLAC TROMETHAMINE 30 MG/ML
30 INJECTION, SOLUTION INTRAMUSCULAR; INTRAVENOUS
Status: COMPLETED | OUTPATIENT
Start: 2020-12-15 | End: 2020-12-15

## 2020-12-15 RX ORDER — CEPHALEXIN 500 MG/1
500 CAPSULE ORAL 2 TIMES DAILY
COMMUNITY
Start: 2020-12-10

## 2020-12-15 RX ADMIN — KETOROLAC TROMETHAMINE 30 MG: 30 INJECTION, SOLUTION INTRAMUSCULAR; INTRAVENOUS at 01:12

## 2020-12-15 NOTE — PROGRESS NOTES
Subjective:       Patient ID: Cheyenne Leach is a 87 y.o. female.    Chief Complaint: Follow-up (hospital follow up from bladder problems, hasn't gotten better )    Pt is a 87 y.o. female who presents for check up for Acute cystitis with hematuria & lower abdominal pain is improved (primary encounter diagnosis)  Lower abdominal pain  Impaired mobility  Essential hypertension. Doing well on current meds. Denies any side effects. Prevention is up to date.    Review of Systems   Constitutional: Negative for appetite change, chills and fever.   HENT: Negative for rhinorrhea, sinus pressure, sore throat and trouble swallowing.    Respiratory: Negative for cough, chest tightness, shortness of breath and wheezing.    Cardiovascular: Negative for chest pain and palpitations.   Gastrointestinal: Negative for abdominal pain, blood in stool, diarrhea, nausea and vomiting.        Having BMs;   Genitourinary: Negative for dysuria, flank pain, hematuria, pelvic pain, urgency, vaginal bleeding, vaginal discharge and vaginal pain.        Urinating well again   Musculoskeletal: Positive for arthralgias and back pain. Negative for joint swelling and neck stiffness.   Skin: Negative for rash.   Neurological: Negative for dizziness, weakness, light-headedness, numbness and headaches.   Hematological: Does not bruise/bleed easily.   Psychiatric/Behavioral: Negative for agitation. The patient is not nervous/anxious.        Objective:      Physical Exam  Constitutional:       Appearance: She is well-developed.   HENT:      Head: Normocephalic.   Eyes:      Pupils: Pupils are equal, round, and reactive to light.   Neck:      Musculoskeletal: Normal range of motion and neck supple.      Thyroid: No thyromegaly.   Cardiovascular:      Rate and Rhythm: Normal rate and regular rhythm.   Pulmonary:      Effort: No respiratory distress.      Breath sounds: No wheezing or rales.   Chest:      Chest wall: No tenderness.   Abdominal:       General: There is no distension.      Tenderness: There is no abdominal tenderness. There is no guarding or rebound.   Musculoskeletal: Normal range of motion.         General: No tenderness.   Lymphadenopathy:      Cervical: No cervical adenopathy.   Skin:     General: Skin is warm and dry.      Coloration: Skin is not pale.      Findings: No rash.   Neurological:      Mental Status: She is alert and oriented to person, place, and time.      Cranial Nerves: No cranial nerve deficit.      Motor: No abnormal muscle tone.      Coordination: Coordination normal.      Deep Tendon Reflexes: Reflexes are normal and symmetric. Reflexes normal.   Psychiatric:         Thought Content: Thought content normal.         Judgment: Judgment normal.         Assessment:       1. Acute cystitis with hematuria    2. Lower abdominal pain    3. Impaired mobility    4. Essential hypertension        Plan:   Cheyenne was seen today for follow-up.    Diagnoses and all orders for this visit:    Acute cystitis with hematuria    Lower abdominal pain    Impaired mobility    Essential hypertension    Other orders  -     HYDROcodone-acetaminophen (NORCO)  mg per tablet; Take 1 tablet by mouth every 6 (six) hours as needed.

## 2020-12-16 ENCOUNTER — CLINICAL SUPPORT (OUTPATIENT)
Dept: FAMILY MEDICINE | Facility: CLINIC | Age: 85
End: 2020-12-16
Payer: MEDICARE

## 2020-12-16 DIAGNOSIS — R30.0 DYSURIA: Primary | ICD-10-CM

## 2020-12-16 LAB
BILIRUB UR QL STRIP: NEGATIVE
CLARITY UR: CLEAR
COLOR UR: YELLOW
GLUCOSE UR QL STRIP: NEGATIVE
HGB UR QL STRIP: NEGATIVE
KETONES UR QL STRIP: NEGATIVE
LEUKOCYTE ESTERASE UR QL STRIP: NEGATIVE
NITRITE UR QL STRIP: NEGATIVE
PH UR STRIP: 7 [PH] (ref 5–8)
PROT UR QL STRIP: NEGATIVE
SP GR UR STRIP: 1.02 (ref 1–1.03)
URN SPEC COLLECT METH UR: NORMAL
UROBILINOGEN UR STRIP-ACNC: NEGATIVE EU/DL

## 2020-12-16 PROCEDURE — 81003 URINALYSIS AUTO W/O SCOPE: CPT

## 2020-12-29 ENCOUNTER — OFFICE VISIT (OUTPATIENT)
Dept: FAMILY MEDICINE | Facility: CLINIC | Age: 85
End: 2020-12-29
Payer: MEDICARE

## 2020-12-29 VITALS
HEART RATE: 80 BPM | RESPIRATION RATE: 20 BRPM | TEMPERATURE: 100 F | WEIGHT: 120.25 LBS | SYSTOLIC BLOOD PRESSURE: 150 MMHG | HEIGHT: 59 IN | DIASTOLIC BLOOD PRESSURE: 60 MMHG | BODY MASS INDEX: 24.24 KG/M2

## 2020-12-29 DIAGNOSIS — K59.09 CHRONIC CONSTIPATION: ICD-10-CM

## 2020-12-29 DIAGNOSIS — I10 ESSENTIAL HYPERTENSION: Primary | ICD-10-CM

## 2020-12-29 DIAGNOSIS — R10.30 LOWER ABDOMINAL PAIN: ICD-10-CM

## 2020-12-29 PROCEDURE — 1157F PR ADVANCE CARE PLAN OR EQUIV PRESENT IN MEDICAL RECORD: ICD-10-PCS | Mod: S$GLB,,, | Performed by: FAMILY MEDICINE

## 2020-12-29 PROCEDURE — 99213 OFFICE O/P EST LOW 20 MIN: CPT | Mod: 25,S$GLB,, | Performed by: FAMILY MEDICINE

## 2020-12-29 PROCEDURE — 99999 PR PBB SHADOW E&M-EST. PATIENT-LVL III: ICD-10-PCS | Mod: PBBFAC,,, | Performed by: FAMILY MEDICINE

## 2020-12-29 PROCEDURE — 1101F PR PT FALLS ASSESS DOC 0-1 FALLS W/OUT INJ PAST YR: ICD-10-PCS | Mod: CPTII,S$GLB,, | Performed by: FAMILY MEDICINE

## 2020-12-29 PROCEDURE — 99213 PR OFFICE/OUTPT VISIT, EST, LEVL III, 20-29 MIN: ICD-10-PCS | Mod: 25,S$GLB,, | Performed by: FAMILY MEDICINE

## 2020-12-29 PROCEDURE — 1125F PR PAIN SEVERITY QUANTIFIED, PAIN PRESENT: ICD-10-PCS | Mod: S$GLB,,, | Performed by: FAMILY MEDICINE

## 2020-12-29 PROCEDURE — 1157F ADVNC CARE PLAN IN RCRD: CPT | Mod: S$GLB,,, | Performed by: FAMILY MEDICINE

## 2020-12-29 PROCEDURE — 1125F AMNT PAIN NOTED PAIN PRSNT: CPT | Mod: S$GLB,,, | Performed by: FAMILY MEDICINE

## 2020-12-29 PROCEDURE — 3288F FALL RISK ASSESSMENT DOCD: CPT | Mod: CPTII,S$GLB,, | Performed by: FAMILY MEDICINE

## 2020-12-29 PROCEDURE — 1159F PR MEDICATION LIST DOCUMENTED IN MEDICAL RECORD: ICD-10-PCS | Mod: S$GLB,,, | Performed by: FAMILY MEDICINE

## 2020-12-29 PROCEDURE — 3288F PR FALLS RISK ASSESSMENT DOCUMENTED: ICD-10-PCS | Mod: CPTII,S$GLB,, | Performed by: FAMILY MEDICINE

## 2020-12-29 PROCEDURE — 1101F PT FALLS ASSESS-DOCD LE1/YR: CPT | Mod: CPTII,S$GLB,, | Performed by: FAMILY MEDICINE

## 2020-12-29 PROCEDURE — 96372 PR INJECTION,THERAP/PROPH/DIAG2ST, IM OR SUBCUT: ICD-10-PCS | Mod: S$GLB,,, | Performed by: FAMILY MEDICINE

## 2020-12-29 PROCEDURE — 96372 THER/PROPH/DIAG INJ SC/IM: CPT | Mod: S$GLB,,, | Performed by: FAMILY MEDICINE

## 2020-12-29 PROCEDURE — 99999 PR PBB SHADOW E&M-EST. PATIENT-LVL III: CPT | Mod: PBBFAC,,, | Performed by: FAMILY MEDICINE

## 2020-12-29 PROCEDURE — 1159F MED LIST DOCD IN RCRD: CPT | Mod: S$GLB,,, | Performed by: FAMILY MEDICINE

## 2020-12-29 RX ORDER — KETOROLAC TROMETHAMINE 30 MG/ML
30 INJECTION, SOLUTION INTRAMUSCULAR; INTRAVENOUS
Status: COMPLETED | OUTPATIENT
Start: 2020-12-29 | End: 2020-12-29

## 2020-12-29 RX ORDER — HYDROCODONE BITARTRATE AND ACETAMINOPHEN 10; 325 MG/1; MG/1
TABLET ORAL
Qty: 120 TABLET | Refills: 0 | Status: SHIPPED | OUTPATIENT
Start: 2020-12-29 | End: 2021-03-08 | Stop reason: SDUPTHER

## 2020-12-29 RX ORDER — CLONIDINE HYDROCHLORIDE 0.1 MG/1
0.1 TABLET ORAL 2 TIMES DAILY
Qty: 180 TABLET | Refills: 3 | Status: SHIPPED | OUTPATIENT
Start: 2020-12-29 | End: 2021-06-28 | Stop reason: SDUPTHER

## 2020-12-29 RX ORDER — METHYLNALTREXONE BROMIDE 150 MG/1
1 TABLET ORAL DAILY
Qty: 30 TABLET | Refills: 5 | Status: SHIPPED | OUTPATIENT
Start: 2020-12-29 | End: 2021-05-10 | Stop reason: SDUPTHER

## 2020-12-29 RX ADMIN — KETOROLAC TROMETHAMINE 30 MG: 30 INJECTION, SOLUTION INTRAMUSCULAR; INTRAVENOUS at 03:12

## 2020-12-29 NOTE — PROGRESS NOTES
Subjective:       Patient ID: Cheyenne Leach is a 87 y.o. female.    Chief Complaint: Follow-up and Weight Loss    Pt is a 87 y.o. female who presents for check up for Essential hypertension  (primary encounter diagnosis)  Lower abdominal pain  Chronic constipation. Doing well on current meds. Denies any side effects. Prevention is up to date.    Review of Systems   Constitutional: Negative for appetite change, chills and fever.   HENT: Negative for rhinorrhea, sinus pressure, sore throat and trouble swallowing.    Respiratory: Negative for cough, chest tightness, shortness of breath and wheezing.    Cardiovascular: Negative for chest pain and palpitations.   Gastrointestinal: Positive for abdominal pain and constipation. Negative for blood in stool, diarrhea, nausea and vomiting.   Genitourinary: Negative for dysuria, flank pain, hematuria, pelvic pain, urgency, vaginal bleeding, vaginal discharge and vaginal pain.   Musculoskeletal: Negative for back pain, joint swelling and neck stiffness.   Skin: Negative for rash.   Neurological: Negative for dizziness, weakness, light-headedness, numbness and headaches.   Hematological: Does not bruise/bleed easily.   Psychiatric/Behavioral: Negative for agitation. The patient is not nervous/anxious.        Objective:      Physical Exam  Constitutional:       Appearance: She is well-developed.   HENT:      Head: Normocephalic.   Eyes:      Pupils: Pupils are equal, round, and reactive to light.   Neck:      Musculoskeletal: Normal range of motion and neck supple.      Thyroid: No thyromegaly.   Cardiovascular:      Rate and Rhythm: Normal rate and regular rhythm.   Pulmonary:      Effort: No respiratory distress.      Breath sounds: No wheezing or rales.   Chest:      Chest wall: No tenderness.   Abdominal:      General: There is no distension.      Tenderness: There is no abdominal tenderness. There is no guarding or rebound.   Musculoskeletal: Normal range of motion.          General: No tenderness.   Lymphadenopathy:      Cervical: No cervical adenopathy.   Skin:     General: Skin is warm and dry.      Coloration: Skin is not pale.      Findings: No rash.   Neurological:      Mental Status: She is alert and oriented to person, place, and time.      Cranial Nerves: No cranial nerve deficit.      Motor: No abnormal muscle tone.      Coordination: Coordination normal.      Deep Tendon Reflexes: Reflexes are normal and symmetric. Reflexes normal.   Psychiatric:         Thought Content: Thought content normal.         Judgment: Judgment normal.         Assessment:       1. Essential hypertension    2. Lower abdominal pain    3. Chronic constipation        Plan:   Cheyenne was seen today for follow-up and weight loss.    Diagnoses and all orders for this visit:    Essential hypertension    Lower abdominal pain    Chronic constipation  -     methylnaltrexone (RELISTOR) 150 mg Tab; Take 1 tablet by mouth once daily.    Other orders  -     HYDROcodone-acetaminophen (NORCO)  mg per tablet; One po q 6 hrs for back pain; opioid medically necessary & exempt for greater then 7 days  -     cloNIDine (CATAPRES) 0.1 MG tablet; Take 1 tablet (0.1 mg total) by mouth 2 (two) times daily.

## 2021-01-20 ENCOUNTER — HOSPITAL ENCOUNTER (OUTPATIENT)
Dept: RADIOLOGY | Facility: HOSPITAL | Age: 86
Discharge: HOME | End: 2021-01-20
Attending: INTERNAL MEDICINE
Payer: MEDICARE

## 2021-01-20 DIAGNOSIS — D50.9 ANEMIA, IRON DEFICIENCY: ICD-10-CM

## 2021-01-20 PROCEDURE — 74177 CT ABD & PELVIS W/CONTRAST: CPT | Mod: 26,,, | Performed by: RADIOLOGY

## 2021-01-20 PROCEDURE — 74177 CT ABDOMEN PELVIS WITH CONTRAST: ICD-10-PCS | Mod: 26,,, | Performed by: RADIOLOGY

## 2021-01-20 PROCEDURE — 74177 CT ABD & PELVIS W/CONTRAST: CPT | Mod: TC

## 2021-01-20 PROCEDURE — 25500020 PHARM REV CODE 255

## 2021-01-20 RX ADMIN — IOHEXOL 75 ML: 350 INJECTION, SOLUTION INTRAVENOUS at 10:01

## 2021-01-20 RX ADMIN — IOHEXOL 30 ML: 350 INJECTION, SOLUTION INTRAVENOUS at 08:01

## 2021-01-22 ENCOUNTER — OFFICE VISIT (OUTPATIENT)
Dept: FAMILY MEDICINE | Facility: CLINIC | Age: 86
End: 2021-01-22
Payer: MEDICARE

## 2021-01-22 VITALS
TEMPERATURE: 97 F | RESPIRATION RATE: 12 BRPM | WEIGHT: 116.5 LBS | SYSTOLIC BLOOD PRESSURE: 110 MMHG | HEART RATE: 64 BPM | BODY MASS INDEX: 23.48 KG/M2 | DIASTOLIC BLOOD PRESSURE: 60 MMHG | HEIGHT: 59 IN

## 2021-01-22 DIAGNOSIS — R63.4 WEIGHT LOSS: ICD-10-CM

## 2021-01-22 DIAGNOSIS — D72.819 LEUKOPENIA, UNSPECIFIED TYPE: Primary | ICD-10-CM

## 2021-01-22 PROCEDURE — 1126F AMNT PAIN NOTED NONE PRSNT: CPT | Mod: S$GLB,,, | Performed by: FAMILY MEDICINE

## 2021-01-22 PROCEDURE — 1157F PR ADVANCE CARE PLAN OR EQUIV PRESENT IN MEDICAL RECORD: ICD-10-PCS | Mod: S$GLB,,, | Performed by: FAMILY MEDICINE

## 2021-01-22 PROCEDURE — 1159F MED LIST DOCD IN RCRD: CPT | Mod: S$GLB,,, | Performed by: FAMILY MEDICINE

## 2021-01-22 PROCEDURE — 99213 PR OFFICE/OUTPT VISIT, EST, LEVL III, 20-29 MIN: ICD-10-PCS | Mod: S$GLB,,, | Performed by: FAMILY MEDICINE

## 2021-01-22 PROCEDURE — 1159F PR MEDICATION LIST DOCUMENTED IN MEDICAL RECORD: ICD-10-PCS | Mod: S$GLB,,, | Performed by: FAMILY MEDICINE

## 2021-01-22 PROCEDURE — 99999 PR PBB SHADOW E&M-EST. PATIENT-LVL V: CPT | Mod: PBBFAC,,, | Performed by: FAMILY MEDICINE

## 2021-01-22 PROCEDURE — 3288F FALL RISK ASSESSMENT DOCD: CPT | Mod: CPTII,S$GLB,, | Performed by: FAMILY MEDICINE

## 2021-01-22 PROCEDURE — 1126F PR PAIN SEVERITY QUANTIFIED, NO PAIN PRESENT: ICD-10-PCS | Mod: S$GLB,,, | Performed by: FAMILY MEDICINE

## 2021-01-22 PROCEDURE — 3288F PR FALLS RISK ASSESSMENT DOCUMENTED: ICD-10-PCS | Mod: CPTII,S$GLB,, | Performed by: FAMILY MEDICINE

## 2021-01-22 PROCEDURE — 1101F PR PT FALLS ASSESS DOC 0-1 FALLS W/OUT INJ PAST YR: ICD-10-PCS | Mod: CPTII,S$GLB,, | Performed by: FAMILY MEDICINE

## 2021-01-22 PROCEDURE — 1101F PT FALLS ASSESS-DOCD LE1/YR: CPT | Mod: CPTII,S$GLB,, | Performed by: FAMILY MEDICINE

## 2021-01-22 PROCEDURE — 99999 PR PBB SHADOW E&M-EST. PATIENT-LVL V: ICD-10-PCS | Mod: PBBFAC,,, | Performed by: FAMILY MEDICINE

## 2021-01-22 PROCEDURE — 99213 OFFICE O/P EST LOW 20 MIN: CPT | Mod: S$GLB,,, | Performed by: FAMILY MEDICINE

## 2021-01-22 PROCEDURE — 1157F ADVNC CARE PLAN IN RCRD: CPT | Mod: S$GLB,,, | Performed by: FAMILY MEDICINE

## 2021-01-22 RX ORDER — HYDROCODONE BITARTRATE AND ACETAMINOPHEN 10; 325 MG/1; MG/1
TABLET ORAL
Qty: 120 TABLET | Refills: 0 | Status: SHIPPED | OUTPATIENT
Start: 2021-01-22 | End: 2021-03-08 | Stop reason: SDUPTHER

## 2021-01-22 RX ORDER — HYDROCODONE BITARTRATE AND ACETAMINOPHEN 10; 325 MG/1; MG/1
TABLET ORAL
Qty: 120 TABLET | Refills: 0 | Status: SHIPPED | OUTPATIENT
Start: 2021-02-22 | End: 2021-03-08 | Stop reason: SDUPTHER

## 2021-03-08 ENCOUNTER — OFFICE VISIT (OUTPATIENT)
Dept: FAMILY MEDICINE | Facility: CLINIC | Age: 86
End: 2021-03-08
Payer: MEDICARE

## 2021-03-08 VITALS
HEIGHT: 59 IN | TEMPERATURE: 99 F | BODY MASS INDEX: 23.2 KG/M2 | WEIGHT: 115.06 LBS | HEART RATE: 60 BPM | RESPIRATION RATE: 12 BRPM | SYSTOLIC BLOOD PRESSURE: 124 MMHG | DIASTOLIC BLOOD PRESSURE: 80 MMHG

## 2021-03-08 DIAGNOSIS — R63.4 WEIGHT LOSS: Primary | ICD-10-CM

## 2021-03-08 DIAGNOSIS — I10 ESSENTIAL HYPERTENSION: ICD-10-CM

## 2021-03-08 DIAGNOSIS — M54.50 LUMBAR PAIN: ICD-10-CM

## 2021-03-08 DIAGNOSIS — M79.7 FIBROMYALGIA: ICD-10-CM

## 2021-03-08 DIAGNOSIS — Z74.09 IMPAIRED MOBILITY: ICD-10-CM

## 2021-03-08 PROCEDURE — 3288F FALL RISK ASSESSMENT DOCD: CPT | Mod: CPTII,S$GLB,, | Performed by: FAMILY MEDICINE

## 2021-03-08 PROCEDURE — 1157F PR ADVANCE CARE PLAN OR EQUIV PRESENT IN MEDICAL RECORD: ICD-10-PCS | Mod: S$GLB,,, | Performed by: FAMILY MEDICINE

## 2021-03-08 PROCEDURE — 96372 THER/PROPH/DIAG INJ SC/IM: CPT | Mod: S$GLB,,, | Performed by: FAMILY MEDICINE

## 2021-03-08 PROCEDURE — 1101F PR PT FALLS ASSESS DOC 0-1 FALLS W/OUT INJ PAST YR: ICD-10-PCS | Mod: CPTII,S$GLB,, | Performed by: FAMILY MEDICINE

## 2021-03-08 PROCEDURE — 1126F PR PAIN SEVERITY QUANTIFIED, NO PAIN PRESENT: ICD-10-PCS | Mod: S$GLB,,, | Performed by: FAMILY MEDICINE

## 2021-03-08 PROCEDURE — 99999 PR PBB SHADOW E&M-EST. PATIENT-LVL IV: ICD-10-PCS | Mod: PBBFAC,,, | Performed by: FAMILY MEDICINE

## 2021-03-08 PROCEDURE — 1159F MED LIST DOCD IN RCRD: CPT | Mod: S$GLB,,, | Performed by: FAMILY MEDICINE

## 2021-03-08 PROCEDURE — 99213 OFFICE O/P EST LOW 20 MIN: CPT | Mod: 25,S$GLB,, | Performed by: FAMILY MEDICINE

## 2021-03-08 PROCEDURE — 99213 PR OFFICE/OUTPT VISIT, EST, LEVL III, 20-29 MIN: ICD-10-PCS | Mod: 25,S$GLB,, | Performed by: FAMILY MEDICINE

## 2021-03-08 PROCEDURE — 99999 PR PBB SHADOW E&M-EST. PATIENT-LVL IV: CPT | Mod: PBBFAC,,, | Performed by: FAMILY MEDICINE

## 2021-03-08 PROCEDURE — 3288F PR FALLS RISK ASSESSMENT DOCUMENTED: ICD-10-PCS | Mod: CPTII,S$GLB,, | Performed by: FAMILY MEDICINE

## 2021-03-08 PROCEDURE — 1126F AMNT PAIN NOTED NONE PRSNT: CPT | Mod: S$GLB,,, | Performed by: FAMILY MEDICINE

## 2021-03-08 PROCEDURE — 96372 PR INJECTION,THERAP/PROPH/DIAG2ST, IM OR SUBCUT: ICD-10-PCS | Mod: S$GLB,,, | Performed by: FAMILY MEDICINE

## 2021-03-08 PROCEDURE — 1157F ADVNC CARE PLAN IN RCRD: CPT | Mod: S$GLB,,, | Performed by: FAMILY MEDICINE

## 2021-03-08 PROCEDURE — 1159F PR MEDICATION LIST DOCUMENTED IN MEDICAL RECORD: ICD-10-PCS | Mod: S$GLB,,, | Performed by: FAMILY MEDICINE

## 2021-03-08 PROCEDURE — 1101F PT FALLS ASSESS-DOCD LE1/YR: CPT | Mod: CPTII,S$GLB,, | Performed by: FAMILY MEDICINE

## 2021-03-08 RX ORDER — HYDROCODONE BITARTRATE AND ACETAMINOPHEN 10; 325 MG/1; MG/1
TABLET ORAL
Qty: 120 TABLET | Refills: 0 | Status: SHIPPED | OUTPATIENT
Start: 2021-04-08 | End: 2021-06-28 | Stop reason: SDUPTHER

## 2021-03-08 RX ORDER — HYDROCODONE BITARTRATE AND ACETAMINOPHEN 10; 325 MG/1; MG/1
1 TABLET ORAL EVERY 6 HOURS PRN
Qty: 120 TABLET | Refills: 0 | Status: SHIPPED | OUTPATIENT
Start: 2021-05-07 | End: 2021-05-04 | Stop reason: SDUPTHER

## 2021-03-08 RX ORDER — HYDROCODONE BITARTRATE AND ACETAMINOPHEN 10; 325 MG/1; MG/1
TABLET ORAL
Qty: 120 TABLET | Refills: 0 | Status: SHIPPED | OUTPATIENT
Start: 2021-03-08

## 2021-03-08 RX ORDER — HYDROCODONE BITARTRATE AND ACETAMINOPHEN 10; 325 MG/1; MG/1
TABLET ORAL
Qty: 120 TABLET | Refills: 0 | Status: SHIPPED | OUTPATIENT
Start: 2021-06-08 | End: 2021-06-17 | Stop reason: SDUPTHER

## 2021-03-08 RX ORDER — KETOROLAC TROMETHAMINE 30 MG/ML
30 INJECTION, SOLUTION INTRAMUSCULAR; INTRAVENOUS
Status: COMPLETED | OUTPATIENT
Start: 2021-03-08 | End: 2021-03-08

## 2021-03-08 RX ADMIN — KETOROLAC TROMETHAMINE 30 MG: 30 INJECTION, SOLUTION INTRAMUSCULAR; INTRAVENOUS at 11:03

## 2021-03-30 ENCOUNTER — OFFICE VISIT (OUTPATIENT)
Dept: FAMILY MEDICINE | Facility: CLINIC | Age: 86
End: 2021-03-30
Payer: MEDICARE

## 2021-03-30 VITALS
DIASTOLIC BLOOD PRESSURE: 78 MMHG | RESPIRATION RATE: 18 BRPM | WEIGHT: 118.38 LBS | HEIGHT: 63 IN | HEART RATE: 68 BPM | BODY MASS INDEX: 20.98 KG/M2 | SYSTOLIC BLOOD PRESSURE: 148 MMHG

## 2021-03-30 DIAGNOSIS — M54.41 CHRONIC BILATERAL LOW BACK PAIN WITH BILATERAL SCIATICA: Primary | ICD-10-CM

## 2021-03-30 DIAGNOSIS — M54.42 CHRONIC BILATERAL LOW BACK PAIN WITH BILATERAL SCIATICA: Primary | ICD-10-CM

## 2021-03-30 DIAGNOSIS — G89.29 CHRONIC BILATERAL LOW BACK PAIN WITH BILATERAL SCIATICA: Primary | ICD-10-CM

## 2021-03-30 PROCEDURE — 99213 PR OFFICE/OUTPT VISIT, EST, LEVL III, 20-29 MIN: ICD-10-PCS | Mod: 25,S$GLB,, | Performed by: FAMILY MEDICINE

## 2021-03-30 PROCEDURE — 99999 PR PBB SHADOW E&M-EST. PATIENT-LVL IV: CPT | Mod: PBBFAC,,, | Performed by: FAMILY MEDICINE

## 2021-03-30 PROCEDURE — 1159F MED LIST DOCD IN RCRD: CPT | Mod: S$GLB,,, | Performed by: FAMILY MEDICINE

## 2021-03-30 PROCEDURE — 1157F ADVNC CARE PLAN IN RCRD: CPT | Mod: S$GLB,,, | Performed by: FAMILY MEDICINE

## 2021-03-30 PROCEDURE — 1125F AMNT PAIN NOTED PAIN PRSNT: CPT | Mod: S$GLB,,, | Performed by: FAMILY MEDICINE

## 2021-03-30 PROCEDURE — 1157F PR ADVANCE CARE PLAN OR EQUIV PRESENT IN MEDICAL RECORD: ICD-10-PCS | Mod: S$GLB,,, | Performed by: FAMILY MEDICINE

## 2021-03-30 PROCEDURE — 96372 THER/PROPH/DIAG INJ SC/IM: CPT | Mod: S$GLB,,, | Performed by: FAMILY MEDICINE

## 2021-03-30 PROCEDURE — 1101F PT FALLS ASSESS-DOCD LE1/YR: CPT | Mod: CPTII,S$GLB,, | Performed by: FAMILY MEDICINE

## 2021-03-30 PROCEDURE — 3288F PR FALLS RISK ASSESSMENT DOCUMENTED: ICD-10-PCS | Mod: CPTII,S$GLB,, | Performed by: FAMILY MEDICINE

## 2021-03-30 PROCEDURE — 3288F FALL RISK ASSESSMENT DOCD: CPT | Mod: CPTII,S$GLB,, | Performed by: FAMILY MEDICINE

## 2021-03-30 PROCEDURE — 1125F PR PAIN SEVERITY QUANTIFIED, PAIN PRESENT: ICD-10-PCS | Mod: S$GLB,,, | Performed by: FAMILY MEDICINE

## 2021-03-30 PROCEDURE — 1101F PR PT FALLS ASSESS DOC 0-1 FALLS W/OUT INJ PAST YR: ICD-10-PCS | Mod: CPTII,S$GLB,, | Performed by: FAMILY MEDICINE

## 2021-03-30 PROCEDURE — 96372 PR INJECTION,THERAP/PROPH/DIAG2ST, IM OR SUBCUT: ICD-10-PCS | Mod: S$GLB,,, | Performed by: FAMILY MEDICINE

## 2021-03-30 PROCEDURE — 1159F PR MEDICATION LIST DOCUMENTED IN MEDICAL RECORD: ICD-10-PCS | Mod: S$GLB,,, | Performed by: FAMILY MEDICINE

## 2021-03-30 PROCEDURE — 99999 PR PBB SHADOW E&M-EST. PATIENT-LVL IV: ICD-10-PCS | Mod: PBBFAC,,, | Performed by: FAMILY MEDICINE

## 2021-03-30 PROCEDURE — 99213 OFFICE O/P EST LOW 20 MIN: CPT | Mod: 25,S$GLB,, | Performed by: FAMILY MEDICINE

## 2021-03-30 RX ORDER — KETOROLAC TROMETHAMINE 30 MG/ML
15 INJECTION, SOLUTION INTRAMUSCULAR; INTRAVENOUS
Status: COMPLETED | OUTPATIENT
Start: 2021-03-30 | End: 2021-03-30

## 2021-03-30 RX ORDER — METHYLPREDNISOLONE ACETATE 40 MG/ML
60 INJECTION, SUSPENSION INTRA-ARTICULAR; INTRALESIONAL; INTRAMUSCULAR; SOFT TISSUE
Status: COMPLETED | OUTPATIENT
Start: 2021-03-30 | End: 2021-03-30

## 2021-03-30 RX ADMIN — KETOROLAC TROMETHAMINE 15 MG: 30 INJECTION, SOLUTION INTRAMUSCULAR; INTRAVENOUS at 01:03

## 2021-03-30 RX ADMIN — METHYLPREDNISOLONE ACETATE 60 MG: 40 INJECTION, SUSPENSION INTRA-ARTICULAR; INTRALESIONAL; INTRAMUSCULAR; SOFT TISSUE at 02:03

## 2021-04-05 ENCOUNTER — OFFICE VISIT (OUTPATIENT)
Dept: FAMILY MEDICINE | Facility: CLINIC | Age: 86
End: 2021-04-05
Payer: MEDICARE

## 2021-04-05 VITALS
RESPIRATION RATE: 12 BRPM | HEIGHT: 63 IN | WEIGHT: 113.44 LBS | DIASTOLIC BLOOD PRESSURE: 82 MMHG | BODY MASS INDEX: 20.1 KG/M2 | HEART RATE: 68 BPM | SYSTOLIC BLOOD PRESSURE: 140 MMHG | TEMPERATURE: 97 F

## 2021-04-05 DIAGNOSIS — M54.41 CHRONIC BILATERAL LOW BACK PAIN WITH BILATERAL SCIATICA: Primary | ICD-10-CM

## 2021-04-05 DIAGNOSIS — M54.42 CHRONIC BILATERAL LOW BACK PAIN WITH BILATERAL SCIATICA: Primary | ICD-10-CM

## 2021-04-05 DIAGNOSIS — M79.7 FIBROMYALGIA: ICD-10-CM

## 2021-04-05 DIAGNOSIS — Z74.09 IMPAIRED MOBILITY: ICD-10-CM

## 2021-04-05 DIAGNOSIS — G89.29 CHRONIC BILATERAL LOW BACK PAIN WITH BILATERAL SCIATICA: Primary | ICD-10-CM

## 2021-04-05 DIAGNOSIS — I10 ESSENTIAL HYPERTENSION: ICD-10-CM

## 2021-04-05 PROCEDURE — 99999 PR PBB SHADOW E&M-EST. PATIENT-LVL V: ICD-10-PCS | Mod: PBBFAC,,, | Performed by: FAMILY MEDICINE

## 2021-04-05 PROCEDURE — 96372 PR INJECTION,THERAP/PROPH/DIAG2ST, IM OR SUBCUT: ICD-10-PCS | Mod: S$GLB,,, | Performed by: FAMILY MEDICINE

## 2021-04-05 PROCEDURE — 1159F PR MEDICATION LIST DOCUMENTED IN MEDICAL RECORD: ICD-10-PCS | Mod: S$GLB,,, | Performed by: FAMILY MEDICINE

## 2021-04-05 PROCEDURE — 96372 THER/PROPH/DIAG INJ SC/IM: CPT | Mod: S$GLB,,, | Performed by: FAMILY MEDICINE

## 2021-04-05 PROCEDURE — 1157F ADVNC CARE PLAN IN RCRD: CPT | Mod: S$GLB,,, | Performed by: FAMILY MEDICINE

## 2021-04-05 PROCEDURE — 1157F PR ADVANCE CARE PLAN OR EQUIV PRESENT IN MEDICAL RECORD: ICD-10-PCS | Mod: S$GLB,,, | Performed by: FAMILY MEDICINE

## 2021-04-05 PROCEDURE — 1101F PT FALLS ASSESS-DOCD LE1/YR: CPT | Mod: CPTII,S$GLB,, | Performed by: FAMILY MEDICINE

## 2021-04-05 PROCEDURE — 99999 PR PBB SHADOW E&M-EST. PATIENT-LVL V: CPT | Mod: PBBFAC,,, | Performed by: FAMILY MEDICINE

## 2021-04-05 PROCEDURE — 3288F PR FALLS RISK ASSESSMENT DOCUMENTED: ICD-10-PCS | Mod: CPTII,S$GLB,, | Performed by: FAMILY MEDICINE

## 2021-04-05 PROCEDURE — 1159F MED LIST DOCD IN RCRD: CPT | Mod: S$GLB,,, | Performed by: FAMILY MEDICINE

## 2021-04-05 PROCEDURE — 99213 OFFICE O/P EST LOW 20 MIN: CPT | Mod: 25,S$GLB,, | Performed by: FAMILY MEDICINE

## 2021-04-05 PROCEDURE — 1101F PR PT FALLS ASSESS DOC 0-1 FALLS W/OUT INJ PAST YR: ICD-10-PCS | Mod: CPTII,S$GLB,, | Performed by: FAMILY MEDICINE

## 2021-04-05 PROCEDURE — 1125F AMNT PAIN NOTED PAIN PRSNT: CPT | Mod: S$GLB,,, | Performed by: FAMILY MEDICINE

## 2021-04-05 PROCEDURE — 99213 PR OFFICE/OUTPT VISIT, EST, LEVL III, 20-29 MIN: ICD-10-PCS | Mod: 25,S$GLB,, | Performed by: FAMILY MEDICINE

## 2021-04-05 PROCEDURE — 3288F FALL RISK ASSESSMENT DOCD: CPT | Mod: CPTII,S$GLB,, | Performed by: FAMILY MEDICINE

## 2021-04-05 PROCEDURE — 1125F PR PAIN SEVERITY QUANTIFIED, PAIN PRESENT: ICD-10-PCS | Mod: S$GLB,,, | Performed by: FAMILY MEDICINE

## 2021-04-05 RX ORDER — KETOROLAC TROMETHAMINE 30 MG/ML
30 INJECTION, SOLUTION INTRAMUSCULAR; INTRAVENOUS
Status: COMPLETED | OUTPATIENT
Start: 2021-04-05 | End: 2021-04-05

## 2021-04-05 RX ORDER — ONDANSETRON HYDROCHLORIDE 8 MG/1
8 TABLET, FILM COATED ORAL EVERY 8 HOURS PRN
Qty: 10 TABLET | Refills: 5 | Status: SHIPPED | OUTPATIENT
Start: 2021-04-05 | End: 2021-06-28 | Stop reason: SDUPTHER

## 2021-04-05 RX ADMIN — KETOROLAC TROMETHAMINE 30 MG: 30 INJECTION, SOLUTION INTRAMUSCULAR; INTRAVENOUS at 04:04

## 2021-05-04 RX ORDER — HYDROCODONE BITARTRATE AND ACETAMINOPHEN 10; 325 MG/1; MG/1
1 TABLET ORAL EVERY 6 HOURS PRN
Qty: 120 TABLET | Refills: 0 | Status: SHIPPED | OUTPATIENT
Start: 2021-05-07

## 2021-05-10 ENCOUNTER — OFFICE VISIT (OUTPATIENT)
Dept: FAMILY MEDICINE | Facility: CLINIC | Age: 86
End: 2021-05-10
Payer: MEDICARE

## 2021-05-10 VITALS
WEIGHT: 109.81 LBS | BODY MASS INDEX: 19.46 KG/M2 | RESPIRATION RATE: 14 BRPM | DIASTOLIC BLOOD PRESSURE: 70 MMHG | SYSTOLIC BLOOD PRESSURE: 104 MMHG | HEIGHT: 63 IN | HEART RATE: 66 BPM

## 2021-05-10 DIAGNOSIS — Z74.09 IMPAIRED MOBILITY: Primary | ICD-10-CM

## 2021-05-10 DIAGNOSIS — M54.50 LUMBAR PAIN: ICD-10-CM

## 2021-05-10 DIAGNOSIS — M79.7 FIBROMYALGIA: ICD-10-CM

## 2021-05-10 DIAGNOSIS — K59.09 CHRONIC CONSTIPATION: ICD-10-CM

## 2021-05-10 DIAGNOSIS — I10 ESSENTIAL HYPERTENSION: ICD-10-CM

## 2021-05-10 PROCEDURE — 1157F ADVNC CARE PLAN IN RCRD: CPT | Mod: S$GLB,,, | Performed by: FAMILY MEDICINE

## 2021-05-10 PROCEDURE — 3288F PR FALLS RISK ASSESSMENT DOCUMENTED: ICD-10-PCS | Mod: CPTII,S$GLB,, | Performed by: FAMILY MEDICINE

## 2021-05-10 PROCEDURE — 99999 PR PBB SHADOW E&M-EST. PATIENT-LVL IV: CPT | Mod: PBBFAC,,, | Performed by: FAMILY MEDICINE

## 2021-05-10 PROCEDURE — 3288F FALL RISK ASSESSMENT DOCD: CPT | Mod: CPTII,S$GLB,, | Performed by: FAMILY MEDICINE

## 2021-05-10 PROCEDURE — 99213 OFFICE O/P EST LOW 20 MIN: CPT | Mod: S$GLB,,, | Performed by: FAMILY MEDICINE

## 2021-05-10 PROCEDURE — 1101F PR PT FALLS ASSESS DOC 0-1 FALLS W/OUT INJ PAST YR: ICD-10-PCS | Mod: CPTII,S$GLB,, | Performed by: FAMILY MEDICINE

## 2021-05-10 PROCEDURE — 1159F MED LIST DOCD IN RCRD: CPT | Mod: S$GLB,,, | Performed by: FAMILY MEDICINE

## 2021-05-10 PROCEDURE — 1157F PR ADVANCE CARE PLAN OR EQUIV PRESENT IN MEDICAL RECORD: ICD-10-PCS | Mod: S$GLB,,, | Performed by: FAMILY MEDICINE

## 2021-05-10 PROCEDURE — 99213 PR OFFICE/OUTPT VISIT, EST, LEVL III, 20-29 MIN: ICD-10-PCS | Mod: S$GLB,,, | Performed by: FAMILY MEDICINE

## 2021-05-10 PROCEDURE — 1101F PT FALLS ASSESS-DOCD LE1/YR: CPT | Mod: CPTII,S$GLB,, | Performed by: FAMILY MEDICINE

## 2021-05-10 PROCEDURE — 99999 PR PBB SHADOW E&M-EST. PATIENT-LVL IV: ICD-10-PCS | Mod: PBBFAC,,, | Performed by: FAMILY MEDICINE

## 2021-05-10 PROCEDURE — 1159F PR MEDICATION LIST DOCUMENTED IN MEDICAL RECORD: ICD-10-PCS | Mod: S$GLB,,, | Performed by: FAMILY MEDICINE

## 2021-05-10 RX ORDER — CELECOXIB 200 MG/1
200 CAPSULE ORAL DAILY
Qty: 30 CAPSULE | Refills: 5 | Status: SHIPPED | OUTPATIENT
Start: 2021-05-10 | End: 2021-06-28 | Stop reason: SDUPTHER

## 2021-05-10 RX ORDER — HYDROCODONE BITARTRATE AND ACETAMINOPHEN 10; 325 MG/15ML; MG/15ML
SOLUTION ORAL
COMMUNITY
Start: 2021-04-12 | End: 2021-05-10 | Stop reason: SDUPTHER

## 2021-05-10 RX ORDER — METHYLNALTREXONE BROMIDE 150 MG/1
1 TABLET ORAL DAILY
Qty: 30 TABLET | Refills: 5 | Status: SHIPPED | OUTPATIENT
Start: 2021-05-10 | End: 2021-06-28 | Stop reason: SDUPTHER

## 2021-05-25 ENCOUNTER — OFFICE VISIT (OUTPATIENT)
Dept: FAMILY MEDICINE | Facility: CLINIC | Age: 86
End: 2021-05-25
Payer: MEDICARE

## 2021-05-25 VITALS
RESPIRATION RATE: 12 BRPM | TEMPERATURE: 99 F | DIASTOLIC BLOOD PRESSURE: 90 MMHG | WEIGHT: 112.31 LBS | SYSTOLIC BLOOD PRESSURE: 146 MMHG | HEIGHT: 63 IN | HEART RATE: 68 BPM | BODY MASS INDEX: 19.9 KG/M2

## 2021-05-25 DIAGNOSIS — M54.41 CHRONIC BILATERAL LOW BACK PAIN WITH BILATERAL SCIATICA: ICD-10-CM

## 2021-05-25 DIAGNOSIS — Z74.09 IMPAIRED MOBILITY: Primary | ICD-10-CM

## 2021-05-25 DIAGNOSIS — K59.09 CHRONIC CONSTIPATION: ICD-10-CM

## 2021-05-25 DIAGNOSIS — M79.7 FIBROMYALGIA: ICD-10-CM

## 2021-05-25 DIAGNOSIS — M54.50 LUMBAR PAIN: ICD-10-CM

## 2021-05-25 DIAGNOSIS — G89.29 CHRONIC BILATERAL LOW BACK PAIN WITH BILATERAL SCIATICA: ICD-10-CM

## 2021-05-25 DIAGNOSIS — M54.42 CHRONIC BILATERAL LOW BACK PAIN WITH BILATERAL SCIATICA: ICD-10-CM

## 2021-05-25 DIAGNOSIS — I10 ESSENTIAL HYPERTENSION: ICD-10-CM

## 2021-05-25 PROCEDURE — 96372 THER/PROPH/DIAG INJ SC/IM: CPT | Mod: S$GLB,,, | Performed by: FAMILY MEDICINE

## 2021-05-25 PROCEDURE — 1101F PR PT FALLS ASSESS DOC 0-1 FALLS W/OUT INJ PAST YR: ICD-10-PCS | Mod: CPTII,S$GLB,, | Performed by: FAMILY MEDICINE

## 2021-05-25 PROCEDURE — 99213 PR OFFICE/OUTPT VISIT, EST, LEVL III, 20-29 MIN: ICD-10-PCS | Mod: 25,S$GLB,, | Performed by: FAMILY MEDICINE

## 2021-05-25 PROCEDURE — 3288F FALL RISK ASSESSMENT DOCD: CPT | Mod: CPTII,S$GLB,, | Performed by: FAMILY MEDICINE

## 2021-05-25 PROCEDURE — 1157F ADVNC CARE PLAN IN RCRD: CPT | Mod: S$GLB,,, | Performed by: FAMILY MEDICINE

## 2021-05-25 PROCEDURE — 1125F PR PAIN SEVERITY QUANTIFIED, PAIN PRESENT: ICD-10-PCS | Mod: S$GLB,,, | Performed by: FAMILY MEDICINE

## 2021-05-25 PROCEDURE — 99213 OFFICE O/P EST LOW 20 MIN: CPT | Mod: 25,S$GLB,, | Performed by: FAMILY MEDICINE

## 2021-05-25 PROCEDURE — 3288F PR FALLS RISK ASSESSMENT DOCUMENTED: ICD-10-PCS | Mod: CPTII,S$GLB,, | Performed by: FAMILY MEDICINE

## 2021-05-25 PROCEDURE — 96372 PR INJECTION,THERAP/PROPH/DIAG2ST, IM OR SUBCUT: ICD-10-PCS | Mod: S$GLB,,, | Performed by: FAMILY MEDICINE

## 2021-05-25 PROCEDURE — 99999 PR PBB SHADOW E&M-EST. PATIENT-LVL V: CPT | Mod: PBBFAC,,, | Performed by: FAMILY MEDICINE

## 2021-05-25 PROCEDURE — 1159F MED LIST DOCD IN RCRD: CPT | Mod: S$GLB,,, | Performed by: FAMILY MEDICINE

## 2021-05-25 PROCEDURE — 1125F AMNT PAIN NOTED PAIN PRSNT: CPT | Mod: S$GLB,,, | Performed by: FAMILY MEDICINE

## 2021-05-25 PROCEDURE — 1159F PR MEDICATION LIST DOCUMENTED IN MEDICAL RECORD: ICD-10-PCS | Mod: S$GLB,,, | Performed by: FAMILY MEDICINE

## 2021-05-25 PROCEDURE — 1101F PT FALLS ASSESS-DOCD LE1/YR: CPT | Mod: CPTII,S$GLB,, | Performed by: FAMILY MEDICINE

## 2021-05-25 PROCEDURE — 1157F PR ADVANCE CARE PLAN OR EQUIV PRESENT IN MEDICAL RECORD: ICD-10-PCS | Mod: S$GLB,,, | Performed by: FAMILY MEDICINE

## 2021-05-25 PROCEDURE — 99999 PR PBB SHADOW E&M-EST. PATIENT-LVL V: ICD-10-PCS | Mod: PBBFAC,,, | Performed by: FAMILY MEDICINE

## 2021-05-25 RX ORDER — KETOROLAC TROMETHAMINE 30 MG/ML
30 INJECTION, SOLUTION INTRAMUSCULAR; INTRAVENOUS
Status: COMPLETED | OUTPATIENT
Start: 2021-05-25 | End: 2021-05-25

## 2021-05-25 RX ORDER — BUPRENORPHINE 10 UG/H
PATCH TRANSDERMAL
Qty: 4 PATCH | Refills: 5 | Status: SHIPPED | OUTPATIENT
Start: 2021-05-25

## 2021-05-25 RX ADMIN — KETOROLAC TROMETHAMINE 30 MG: 30 INJECTION, SOLUTION INTRAMUSCULAR; INTRAVENOUS at 12:05

## 2021-06-17 ENCOUNTER — TELEPHONE (OUTPATIENT)
Dept: FAMILY MEDICINE | Facility: CLINIC | Age: 86
End: 2021-06-17

## 2021-06-17 RX ORDER — HYDROCODONE BITARTRATE AND ACETAMINOPHEN 10; 325 MG/1; MG/1
TABLET ORAL
Qty: 120 TABLET | Refills: 0 | Status: SHIPPED | OUTPATIENT
Start: 2021-06-17

## 2021-06-28 ENCOUNTER — OFFICE VISIT (OUTPATIENT)
Dept: FAMILY MEDICINE | Facility: CLINIC | Age: 86
End: 2021-06-28
Payer: MEDICARE

## 2021-06-28 VITALS
BODY MASS INDEX: 19.59 KG/M2 | DIASTOLIC BLOOD PRESSURE: 78 MMHG | HEIGHT: 63 IN | HEART RATE: 84 BPM | RESPIRATION RATE: 20 BRPM | TEMPERATURE: 98 F | WEIGHT: 110.56 LBS | SYSTOLIC BLOOD PRESSURE: 132 MMHG

## 2021-06-28 DIAGNOSIS — M54.42 CHRONIC BILATERAL LOW BACK PAIN WITH BILATERAL SCIATICA: ICD-10-CM

## 2021-06-28 DIAGNOSIS — M79.604 BILATERAL LEG PAIN: ICD-10-CM

## 2021-06-28 DIAGNOSIS — M25.561 CHRONIC PAIN OF RIGHT KNEE: Primary | ICD-10-CM

## 2021-06-28 DIAGNOSIS — M79.7 FIBROMYALGIA: ICD-10-CM

## 2021-06-28 DIAGNOSIS — M54.50 LOW BACK PAIN, NON-SPECIFIC: ICD-10-CM

## 2021-06-28 DIAGNOSIS — M54.41 CHRONIC BILATERAL LOW BACK PAIN WITH BILATERAL SCIATICA: ICD-10-CM

## 2021-06-28 DIAGNOSIS — K66.0 ABDOMINAL ADHESIONS: ICD-10-CM

## 2021-06-28 DIAGNOSIS — M54.50 LUMBAR PAIN: ICD-10-CM

## 2021-06-28 DIAGNOSIS — G89.29 CHRONIC PAIN: ICD-10-CM

## 2021-06-28 DIAGNOSIS — G89.29 CHRONIC PAIN OF RIGHT KNEE: Primary | ICD-10-CM

## 2021-06-28 DIAGNOSIS — Z74.09 IMPAIRED MOBILITY: ICD-10-CM

## 2021-06-28 DIAGNOSIS — G57.90 NEUROPATHY OF LOWER EXTREMITY, UNSPECIFIED LATERALITY: ICD-10-CM

## 2021-06-28 DIAGNOSIS — G89.29 CHRONIC BILATERAL LOW BACK PAIN WITH BILATERAL SCIATICA: ICD-10-CM

## 2021-06-28 DIAGNOSIS — N30.01 ACUTE CYSTITIS WITH HEMATURIA: ICD-10-CM

## 2021-06-28 DIAGNOSIS — K59.09 CHRONIC CONSTIPATION: ICD-10-CM

## 2021-06-28 DIAGNOSIS — M79.605 BILATERAL LEG PAIN: ICD-10-CM

## 2021-06-28 DIAGNOSIS — R30.0 DYSURIA: ICD-10-CM

## 2021-06-28 DIAGNOSIS — G89.4 CHRONIC PAIN SYNDROME: ICD-10-CM

## 2021-06-28 PROCEDURE — 3288F FALL RISK ASSESSMENT DOCD: CPT | Mod: CPTII,S$GLB,, | Performed by: FAMILY MEDICINE

## 2021-06-28 PROCEDURE — 1101F PT FALLS ASSESS-DOCD LE1/YR: CPT | Mod: CPTII,S$GLB,, | Performed by: FAMILY MEDICINE

## 2021-06-28 PROCEDURE — 20610 DRAIN/INJ JOINT/BURSA W/O US: CPT | Mod: RT,S$GLB,, | Performed by: FAMILY MEDICINE

## 2021-06-28 PROCEDURE — 1157F ADVNC CARE PLAN IN RCRD: CPT | Mod: S$GLB,,, | Performed by: FAMILY MEDICINE

## 2021-06-28 PROCEDURE — 99999 PR PBB SHADOW E&M-EST. PATIENT-LVL III: CPT | Mod: PBBFAC,,, | Performed by: FAMILY MEDICINE

## 2021-06-28 PROCEDURE — 1159F MED LIST DOCD IN RCRD: CPT | Mod: S$GLB,,, | Performed by: FAMILY MEDICINE

## 2021-06-28 PROCEDURE — 3288F PR FALLS RISK ASSESSMENT DOCUMENTED: ICD-10-PCS | Mod: CPTII,S$GLB,, | Performed by: FAMILY MEDICINE

## 2021-06-28 PROCEDURE — 1126F AMNT PAIN NOTED NONE PRSNT: CPT | Mod: S$GLB,,, | Performed by: FAMILY MEDICINE

## 2021-06-28 PROCEDURE — 1159F PR MEDICATION LIST DOCUMENTED IN MEDICAL RECORD: ICD-10-PCS | Mod: S$GLB,,, | Performed by: FAMILY MEDICINE

## 2021-06-28 PROCEDURE — 1101F PR PT FALLS ASSESS DOC 0-1 FALLS W/OUT INJ PAST YR: ICD-10-PCS | Mod: CPTII,S$GLB,, | Performed by: FAMILY MEDICINE

## 2021-06-28 PROCEDURE — 99213 PR OFFICE/OUTPT VISIT, EST, LEVL III, 20-29 MIN: ICD-10-PCS | Mod: 25,S$GLB,, | Performed by: FAMILY MEDICINE

## 2021-06-28 PROCEDURE — 1157F PR ADVANCE CARE PLAN OR EQUIV PRESENT IN MEDICAL RECORD: ICD-10-PCS | Mod: S$GLB,,, | Performed by: FAMILY MEDICINE

## 2021-06-28 PROCEDURE — 99213 OFFICE O/P EST LOW 20 MIN: CPT | Mod: 25,S$GLB,, | Performed by: FAMILY MEDICINE

## 2021-06-28 PROCEDURE — 20610 PR DRAIN/INJECT LARGE JOINT/BURSA: ICD-10-PCS | Mod: RT,S$GLB,, | Performed by: FAMILY MEDICINE

## 2021-06-28 PROCEDURE — 99999 PR PBB SHADOW E&M-EST. PATIENT-LVL III: ICD-10-PCS | Mod: PBBFAC,,, | Performed by: FAMILY MEDICINE

## 2021-06-28 PROCEDURE — 1126F PR PAIN SEVERITY QUANTIFIED, NO PAIN PRESENT: ICD-10-PCS | Mod: S$GLB,,, | Performed by: FAMILY MEDICINE

## 2021-06-28 RX ORDER — CELECOXIB 200 MG/1
200 CAPSULE ORAL DAILY
Qty: 30 CAPSULE | Refills: 5 | Status: SHIPPED | OUTPATIENT
Start: 2021-06-28 | End: 2021-07-26 | Stop reason: SDUPTHER

## 2021-06-28 RX ORDER — PHENAZOPYRIDINE HYDROCHLORIDE 100 MG/1
TABLET, FILM COATED ORAL
Qty: 21 TABLET | Refills: 0 | Status: CANCELLED | OUTPATIENT
Start: 2021-06-28

## 2021-06-28 RX ORDER — BACLOFEN 10 MG/1
10 TABLET ORAL 2 TIMES DAILY
Qty: 60 TABLET | Refills: 11 | Status: SHIPPED | OUTPATIENT
Start: 2021-06-28 | End: 2021-07-26 | Stop reason: SDUPTHER

## 2021-06-28 RX ORDER — ERGOCALCIFEROL 1.25 MG/1
50000 CAPSULE ORAL
Qty: 24 CAPSULE | Refills: 1 | Status: CANCELLED | OUTPATIENT
Start: 2021-06-28

## 2021-06-28 RX ORDER — GABAPENTIN 300 MG/1
CAPSULE ORAL
Qty: 210 CAPSULE | Refills: 3 | Status: SHIPPED | OUTPATIENT
Start: 2021-06-28 | End: 2021-07-26 | Stop reason: SDUPTHER

## 2021-06-28 RX ORDER — DICLOFENAC SODIUM 10 MG/G
GEL TOPICAL
Qty: 500 G | Refills: 0 | Status: SHIPPED | OUTPATIENT
Start: 2021-06-28 | End: 2021-07-26 | Stop reason: SDUPTHER

## 2021-06-28 RX ORDER — AMITRIPTYLINE HYDROCHLORIDE 25 MG/1
25 TABLET, FILM COATED ORAL NIGHTLY
Qty: 30 TABLET | Refills: 11 | Status: SHIPPED | OUTPATIENT
Start: 2021-06-28 | End: 2021-07-26 | Stop reason: SDUPTHER

## 2021-06-28 RX ORDER — METHYLNALTREXONE BROMIDE 150 MG/1
1 TABLET ORAL DAILY
Qty: 30 TABLET | Refills: 5 | Status: SHIPPED | OUTPATIENT
Start: 2021-06-28 | End: 2021-07-26 | Stop reason: SDUPTHER

## 2021-06-28 RX ORDER — HYDROCODONE BITARTRATE AND ACETAMINOPHEN 10; 325 MG/1; MG/1
TABLET ORAL
Qty: 120 TABLET | Refills: 0 | Status: SHIPPED | OUTPATIENT
Start: 2021-07-15 | End: 2021-07-26 | Stop reason: SDUPTHER

## 2021-06-28 RX ORDER — METHYLPREDNISOLONE ACETATE 40 MG/ML
40 INJECTION, SUSPENSION INTRA-ARTICULAR; INTRALESIONAL; INTRAMUSCULAR; SOFT TISSUE
Status: COMPLETED | OUTPATIENT
Start: 2021-06-28 | End: 2021-06-28

## 2021-06-28 RX ORDER — SUCRALFATE 1 G/1
TABLET ORAL
Qty: 120 TABLET | Refills: 5 | Status: SHIPPED | OUTPATIENT
Start: 2021-06-28 | End: 2021-07-26 | Stop reason: SDUPTHER

## 2021-06-28 RX ORDER — CLONIDINE HYDROCHLORIDE 0.1 MG/1
0.1 TABLET ORAL 2 TIMES DAILY
Qty: 180 TABLET | Refills: 3 | Status: SHIPPED | OUTPATIENT
Start: 2021-06-28 | End: 2021-07-26 | Stop reason: SDUPTHER

## 2021-06-28 RX ORDER — BUMETANIDE 1 MG/1
1 TABLET ORAL DAILY
Qty: 30 TABLET | Refills: 11 | Status: SHIPPED | OUTPATIENT
Start: 2021-06-28 | End: 2021-07-26 | Stop reason: SDUPTHER

## 2021-06-28 RX ORDER — PANTOPRAZOLE SODIUM 40 MG/1
40 TABLET, DELAYED RELEASE ORAL DAILY
Qty: 30 TABLET | Refills: 5 | Status: SHIPPED | OUTPATIENT
Start: 2021-06-28 | End: 2021-07-26 | Stop reason: SDUPTHER

## 2021-06-28 RX ORDER — KETOROLAC TROMETHAMINE 30 MG/ML
15 INJECTION, SOLUTION INTRAMUSCULAR; INTRAVENOUS
Status: COMPLETED | OUTPATIENT
Start: 2021-06-28 | End: 2021-06-28

## 2021-06-28 RX ORDER — LACTULOSE 10 G/15ML
SOLUTION ORAL
Qty: 473 ML | Refills: 11 | Status: SHIPPED | OUTPATIENT
Start: 2021-06-28 | End: 2021-07-26 | Stop reason: SDUPTHER

## 2021-06-28 RX ORDER — OMEPRAZOLE 20 MG/1
20 CAPSULE, DELAYED RELEASE ORAL DAILY
Qty: 30 CAPSULE | Refills: 11 | Status: SHIPPED | OUTPATIENT
Start: 2021-06-28 | End: 2021-07-26 | Stop reason: SDUPTHER

## 2021-06-28 RX ORDER — MIRABEGRON 25 MG/1
25 TABLET, FILM COATED, EXTENDED RELEASE ORAL DAILY
Qty: 30 TABLET | Refills: 11
Start: 2021-06-28

## 2021-06-28 RX ORDER — ONDANSETRON HYDROCHLORIDE 8 MG/1
8 TABLET, FILM COATED ORAL EVERY 8 HOURS PRN
Qty: 10 TABLET | Refills: 5 | Status: SHIPPED | OUTPATIENT
Start: 2021-06-28 | End: 2021-07-26 | Stop reason: SDUPTHER

## 2021-06-28 RX ORDER — BUPRENORPHINE 10 UG/H
PATCH TRANSDERMAL
Qty: 4 PATCH | Refills: 5 | Status: CANCELLED | OUTPATIENT
Start: 2021-06-28

## 2021-06-28 RX ORDER — DILTIAZEM HYDROCHLORIDE 240 MG/1
CAPSULE, COATED, EXTENDED RELEASE ORAL
Qty: 90 CAPSULE | Refills: 3 | Status: SHIPPED | OUTPATIENT
Start: 2021-06-28 | End: 2021-07-26 | Stop reason: SDUPTHER

## 2021-06-28 RX ORDER — LEVOTHYROXINE SODIUM 50 UG/1
TABLET ORAL
Qty: 30 TABLET | Refills: 2 | Status: CANCELLED | OUTPATIENT
Start: 2021-06-28

## 2021-06-28 RX ADMIN — KETOROLAC TROMETHAMINE 15 MG: 30 INJECTION, SOLUTION INTRAMUSCULAR; INTRAVENOUS at 12:06

## 2021-06-28 RX ADMIN — METHYLPREDNISOLONE ACETATE 40 MG: 40 INJECTION, SUSPENSION INTRA-ARTICULAR; INTRALESIONAL; INTRAMUSCULAR; SOFT TISSUE at 12:06

## 2021-07-26 ENCOUNTER — OFFICE VISIT (OUTPATIENT)
Dept: FAMILY MEDICINE | Facility: CLINIC | Age: 86
End: 2021-07-26
Payer: MEDICARE

## 2021-07-26 VITALS
HEART RATE: 112 BPM | HEIGHT: 63 IN | TEMPERATURE: 98 F | BODY MASS INDEX: 19.2 KG/M2 | DIASTOLIC BLOOD PRESSURE: 82 MMHG | RESPIRATION RATE: 16 BRPM | SYSTOLIC BLOOD PRESSURE: 122 MMHG | WEIGHT: 108.38 LBS

## 2021-07-26 DIAGNOSIS — G89.29 CHRONIC BILATERAL LOW BACK PAIN WITH BILATERAL SCIATICA: ICD-10-CM

## 2021-07-26 DIAGNOSIS — R30.0 DYSURIA: ICD-10-CM

## 2021-07-26 DIAGNOSIS — K66.0 ABDOMINAL ADHESIONS: ICD-10-CM

## 2021-07-26 DIAGNOSIS — M54.50 LUMBAR PAIN: ICD-10-CM

## 2021-07-26 DIAGNOSIS — Z74.09 IMPAIRED MOBILITY: ICD-10-CM

## 2021-07-26 DIAGNOSIS — M79.605 BILATERAL LEG PAIN: ICD-10-CM

## 2021-07-26 DIAGNOSIS — G57.90 NEUROPATHY OF LOWER EXTREMITY, UNSPECIFIED LATERALITY: ICD-10-CM

## 2021-07-26 DIAGNOSIS — G89.4 CHRONIC PAIN SYNDROME: ICD-10-CM

## 2021-07-26 DIAGNOSIS — K59.09 CHRONIC CONSTIPATION: ICD-10-CM

## 2021-07-26 DIAGNOSIS — M54.42 CHRONIC BILATERAL LOW BACK PAIN WITH BILATERAL SCIATICA: ICD-10-CM

## 2021-07-26 DIAGNOSIS — M79.7 FIBROMYALGIA: ICD-10-CM

## 2021-07-26 DIAGNOSIS — M79.604 BILATERAL LEG PAIN: ICD-10-CM

## 2021-07-26 DIAGNOSIS — M54.50 LOW BACK PAIN, NON-SPECIFIC: ICD-10-CM

## 2021-07-26 DIAGNOSIS — M54.41 CHRONIC BILATERAL LOW BACK PAIN WITH BILATERAL SCIATICA: ICD-10-CM

## 2021-07-26 DIAGNOSIS — N30.01 ACUTE CYSTITIS WITH HEMATURIA: ICD-10-CM

## 2021-07-26 PROCEDURE — 1159F PR MEDICATION LIST DOCUMENTED IN MEDICAL RECORD: ICD-10-PCS | Mod: CPTII,S$GLB,, | Performed by: FAMILY MEDICINE

## 2021-07-26 PROCEDURE — 1101F PR PT FALLS ASSESS DOC 0-1 FALLS W/OUT INJ PAST YR: ICD-10-PCS | Mod: CPTII,S$GLB,, | Performed by: FAMILY MEDICINE

## 2021-07-26 PROCEDURE — 99213 PR OFFICE/OUTPT VISIT, EST, LEVL III, 20-29 MIN: ICD-10-PCS | Mod: 25,S$GLB,, | Performed by: FAMILY MEDICINE

## 2021-07-26 PROCEDURE — 1157F PR ADVANCE CARE PLAN OR EQUIV PRESENT IN MEDICAL RECORD: ICD-10-PCS | Mod: CPTII,S$GLB,, | Performed by: FAMILY MEDICINE

## 2021-07-26 PROCEDURE — 1126F AMNT PAIN NOTED NONE PRSNT: CPT | Mod: CPTII,S$GLB,, | Performed by: FAMILY MEDICINE

## 2021-07-26 PROCEDURE — 96372 THER/PROPH/DIAG INJ SC/IM: CPT | Mod: S$GLB,,, | Performed by: FAMILY MEDICINE

## 2021-07-26 PROCEDURE — 1157F ADVNC CARE PLAN IN RCRD: CPT | Mod: CPTII,S$GLB,, | Performed by: FAMILY MEDICINE

## 2021-07-26 PROCEDURE — 1126F PR PAIN SEVERITY QUANTIFIED, NO PAIN PRESENT: ICD-10-PCS | Mod: CPTII,S$GLB,, | Performed by: FAMILY MEDICINE

## 2021-07-26 PROCEDURE — 99213 OFFICE O/P EST LOW 20 MIN: CPT | Mod: 25,S$GLB,, | Performed by: FAMILY MEDICINE

## 2021-07-26 PROCEDURE — 3288F FALL RISK ASSESSMENT DOCD: CPT | Mod: CPTII,S$GLB,, | Performed by: FAMILY MEDICINE

## 2021-07-26 PROCEDURE — 99999 PR PBB SHADOW E&M-EST. PATIENT-LVL IV: CPT | Mod: PBBFAC,,, | Performed by: FAMILY MEDICINE

## 2021-07-26 PROCEDURE — 3288F PR FALLS RISK ASSESSMENT DOCUMENTED: ICD-10-PCS | Mod: CPTII,S$GLB,, | Performed by: FAMILY MEDICINE

## 2021-07-26 PROCEDURE — 99999 PR PBB SHADOW E&M-EST. PATIENT-LVL IV: ICD-10-PCS | Mod: PBBFAC,,, | Performed by: FAMILY MEDICINE

## 2021-07-26 PROCEDURE — 96372 PR INJECTION,THERAP/PROPH/DIAG2ST, IM OR SUBCUT: ICD-10-PCS | Mod: S$GLB,,, | Performed by: FAMILY MEDICINE

## 2021-07-26 PROCEDURE — 1159F MED LIST DOCD IN RCRD: CPT | Mod: CPTII,S$GLB,, | Performed by: FAMILY MEDICINE

## 2021-07-26 PROCEDURE — 1101F PT FALLS ASSESS-DOCD LE1/YR: CPT | Mod: CPTII,S$GLB,, | Performed by: FAMILY MEDICINE

## 2021-07-26 RX ORDER — BUMETANIDE 1 MG/1
1 TABLET ORAL DAILY
Qty: 30 TABLET | Refills: 11 | Status: SHIPPED | OUTPATIENT
Start: 2021-07-26 | End: 2022-07-26

## 2021-07-26 RX ORDER — MIRABEGRON 25 MG/1
25 TABLET, FILM COATED, EXTENDED RELEASE ORAL DAILY
Qty: 30 TABLET | Refills: 11 | Status: CANCELLED
Start: 2021-07-26

## 2021-07-26 RX ORDER — PANTOPRAZOLE SODIUM 40 MG/1
40 TABLET, DELAYED RELEASE ORAL DAILY
Qty: 30 TABLET | Refills: 5 | Status: SHIPPED | OUTPATIENT
Start: 2021-07-26

## 2021-07-26 RX ORDER — ONDANSETRON HYDROCHLORIDE 8 MG/1
8 TABLET, FILM COATED ORAL EVERY 8 HOURS PRN
Qty: 10 TABLET | Refills: 5 | Status: SHIPPED | OUTPATIENT
Start: 2021-07-26

## 2021-07-26 RX ORDER — METHYLNALTREXONE BROMIDE 150 MG/1
1 TABLET ORAL DAILY
Qty: 30 TABLET | Refills: 5 | Status: SHIPPED | OUTPATIENT
Start: 2021-07-26

## 2021-07-26 RX ORDER — ERGOCALCIFEROL 1.25 MG/1
50000 CAPSULE ORAL
Qty: 24 CAPSULE | Refills: 1 | Status: SHIPPED | OUTPATIENT
Start: 2021-07-26

## 2021-07-26 RX ORDER — AMITRIPTYLINE HYDROCHLORIDE 25 MG/1
25 TABLET, FILM COATED ORAL NIGHTLY
Qty: 30 TABLET | Refills: 11 | Status: SHIPPED | OUTPATIENT
Start: 2021-07-26

## 2021-07-26 RX ORDER — HYDROCODONE BITARTRATE AND ACETAMINOPHEN 10; 325 MG/1; MG/1
TABLET ORAL
Qty: 120 TABLET | Refills: 0 | Status: SHIPPED | OUTPATIENT
Start: 2021-07-26

## 2021-07-26 RX ORDER — BUPRENORPHINE 10 UG/H
PATCH TRANSDERMAL
Qty: 4 PATCH | Refills: 5 | Status: CANCELLED | OUTPATIENT
Start: 2021-07-26

## 2021-07-26 RX ORDER — DILTIAZEM HYDROCHLORIDE 240 MG/1
CAPSULE, COATED, EXTENDED RELEASE ORAL
Qty: 90 CAPSULE | Refills: 3 | Status: SHIPPED | OUTPATIENT
Start: 2021-07-26

## 2021-07-26 RX ORDER — BACLOFEN 10 MG/1
10 TABLET ORAL 2 TIMES DAILY
Qty: 60 TABLET | Refills: 11 | Status: SHIPPED | OUTPATIENT
Start: 2021-07-26 | End: 2022-07-26

## 2021-07-26 RX ORDER — GABAPENTIN 300 MG/1
CAPSULE ORAL
Qty: 210 CAPSULE | Refills: 3 | Status: SHIPPED | OUTPATIENT
Start: 2021-07-26

## 2021-07-26 RX ORDER — OMEPRAZOLE 20 MG/1
20 CAPSULE, DELAYED RELEASE ORAL DAILY
Qty: 30 CAPSULE | Refills: 11 | Status: SHIPPED | OUTPATIENT
Start: 2021-07-26

## 2021-07-26 RX ORDER — LACTULOSE 10 G/15ML
SOLUTION ORAL
Qty: 473 ML | Refills: 11 | Status: SHIPPED | OUTPATIENT
Start: 2021-07-26

## 2021-07-26 RX ORDER — CELECOXIB 200 MG/1
200 CAPSULE ORAL DAILY
Qty: 30 CAPSULE | Refills: 5 | Status: SHIPPED | OUTPATIENT
Start: 2021-07-26

## 2021-07-26 RX ORDER — SUCRALFATE 1 G/1
TABLET ORAL
Qty: 120 TABLET | Refills: 5 | Status: SHIPPED | OUTPATIENT
Start: 2021-07-26

## 2021-07-26 RX ORDER — KETOROLAC TROMETHAMINE 30 MG/ML
30 INJECTION, SOLUTION INTRAMUSCULAR; INTRAVENOUS
Status: COMPLETED | OUTPATIENT
Start: 2021-07-26 | End: 2021-07-26

## 2021-07-26 RX ORDER — CLONIDINE HYDROCHLORIDE 0.1 MG/1
0.1 TABLET ORAL 2 TIMES DAILY
Qty: 180 TABLET | Refills: 3 | Status: SHIPPED | OUTPATIENT
Start: 2021-07-26

## 2021-07-26 RX ORDER — LEVOTHYROXINE SODIUM 50 UG/1
TABLET ORAL
Qty: 30 TABLET | Refills: 2 | Status: SHIPPED | OUTPATIENT
Start: 2021-07-26

## 2021-07-26 RX ORDER — PHENAZOPYRIDINE HYDROCHLORIDE 100 MG/1
TABLET, FILM COATED ORAL
Qty: 21 TABLET | Refills: 0 | Status: SHIPPED | OUTPATIENT
Start: 2021-07-26

## 2021-07-26 RX ORDER — DICLOFENAC SODIUM 10 MG/G
GEL TOPICAL
Qty: 500 G | Refills: 0 | Status: SHIPPED | OUTPATIENT
Start: 2021-07-26

## 2021-07-26 RX ADMIN — KETOROLAC TROMETHAMINE 30 MG: 30 INJECTION, SOLUTION INTRAMUSCULAR; INTRAVENOUS at 09:07

## 2021-09-23 ENCOUNTER — TELEPHONE (OUTPATIENT)
Dept: FAMILY MEDICINE | Facility: CLINIC | Age: 86
End: 2021-09-23

## 2022-05-19 DIAGNOSIS — I10 ESSENTIAL HYPERTENSION: ICD-10-CM

## (undated) DEVICE — Device

## (undated) DEVICE — GLOVE 7.5 PROTEXIS PI MICRO

## (undated) DEVICE — SYR OCUCOAT 1ML

## (undated) DEVICE — VISION BLUE

## (undated) DEVICE — TIP I & A

## (undated) DEVICE — GLOVE 6.5 PROTEXIS PI MICRO

## (undated) DEVICE — PACK EYE